# Patient Record
Sex: MALE | Race: WHITE | NOT HISPANIC OR LATINO | Employment: FULL TIME | URBAN - METROPOLITAN AREA
[De-identification: names, ages, dates, MRNs, and addresses within clinical notes are randomized per-mention and may not be internally consistent; named-entity substitution may affect disease eponyms.]

---

## 2017-02-17 ENCOUNTER — HOSPITAL ENCOUNTER (OUTPATIENT)
Dept: INFUSION CENTER | Facility: HOSPITAL | Age: 37
Discharge: HOME | End: 2017-02-17
Payer: COMMERCIAL

## 2017-02-17 VITALS
TEMPERATURE: 95.1 F | BODY MASS INDEX: 28.93 KG/M2 | SYSTOLIC BLOOD PRESSURE: 124 MMHG | WEIGHT: 190.26 LBS | DIASTOLIC BLOOD PRESSURE: 72 MMHG | HEART RATE: 68 BPM | RESPIRATION RATE: 20 BRPM

## 2017-02-17 PROCEDURE — 96413 CHEMO IV INFUSION 1 HR: CPT

## 2017-02-17 PROCEDURE — 96415 CHEMO IV INFUSION ADDL HR: CPT

## 2017-02-17 RX ORDER — SODIUM CHLORIDE 9 MG/ML
20 INJECTION, SOLUTION INTRAVENOUS ONCE
Status: COMPLETED | OUTPATIENT
Start: 2017-02-17 | End: 2017-02-17

## 2017-02-17 RX ORDER — DIPHENHYDRAMINE HCL 25 MG
25 TABLET ORAL ONCE
Status: COMPLETED | OUTPATIENT
Start: 2017-02-17 | End: 2017-02-17

## 2017-02-17 RX ORDER — PREDNISONE 20 MG/1
20 TABLET ORAL ONCE
Status: COMPLETED | OUTPATIENT
Start: 2017-02-17 | End: 2017-02-17

## 2017-02-17 RX ORDER — ACETAMINOPHEN 325 MG/1
650 TABLET ORAL ONCE
Status: COMPLETED | OUTPATIENT
Start: 2017-02-17 | End: 2017-02-17

## 2017-02-17 RX ADMIN — PREDNISONE 20 MG: 20 TABLET ORAL at 07:51

## 2017-02-17 RX ADMIN — SODIUM CHLORIDE 20 ML/HR: 0.9 INJECTION, SOLUTION INTRAVENOUS at 07:52

## 2017-02-17 RX ADMIN — ACETAMINOPHEN 650 MG: 325 TABLET, FILM COATED ORAL at 07:52

## 2017-02-17 RX ADMIN — DIPHENHYDRAMINE HCL 25 MG: 25 TABLET ORAL at 07:51

## 2017-02-17 RX ADMIN — INFLIXIMAB 863 MG: 100 INJECTION, POWDER, LYOPHILIZED, FOR SOLUTION INTRAVENOUS at 08:27

## 2017-04-14 ENCOUNTER — HOSPITAL ENCOUNTER (OUTPATIENT)
Dept: INFUSION CENTER | Facility: HOSPITAL | Age: 37
End: 2017-04-14
Payer: COMMERCIAL

## 2017-04-14 RX ORDER — DIPHENHYDRAMINE HCL 25 MG
25 TABLET ORAL ONCE
Status: COMPLETED | OUTPATIENT
Start: 2017-04-17 | End: 2017-04-17

## 2017-04-14 RX ORDER — ACETAMINOPHEN 325 MG/1
650 TABLET ORAL ONCE
Status: COMPLETED | OUTPATIENT
Start: 2017-04-17 | End: 2017-04-17

## 2017-04-14 RX ORDER — SODIUM CHLORIDE 9 MG/ML
20 INJECTION, SOLUTION INTRAVENOUS ONCE
Status: COMPLETED | OUTPATIENT
Start: 2017-04-17 | End: 2017-04-17

## 2017-04-17 ENCOUNTER — HOSPITAL ENCOUNTER (OUTPATIENT)
Dept: INFUSION CENTER | Facility: HOSPITAL | Age: 37
Discharge: HOME | End: 2017-04-17
Payer: COMMERCIAL

## 2017-04-17 VITALS
DIASTOLIC BLOOD PRESSURE: 59 MMHG | TEMPERATURE: 96.8 F | SYSTOLIC BLOOD PRESSURE: 111 MMHG | BODY MASS INDEX: 30.54 KG/M2 | HEART RATE: 74 BPM | WEIGHT: 200.84 LBS | RESPIRATION RATE: 20 BRPM

## 2017-04-17 PROCEDURE — 96413 CHEMO IV INFUSION 1 HR: CPT

## 2017-04-17 PROCEDURE — 96415 CHEMO IV INFUSION ADDL HR: CPT

## 2017-04-17 RX ADMIN — SODIUM CHLORIDE 20 ML/HR: 0.9 INJECTION, SOLUTION INTRAVENOUS at 09:40

## 2017-04-17 RX ADMIN — ACETAMINOPHEN 650 MG: 325 TABLET, FILM COATED ORAL at 09:39

## 2017-04-17 RX ADMIN — INFLIXIMAB 900 MG: 100 INJECTION, POWDER, LYOPHILIZED, FOR SOLUTION INTRAVENOUS at 10:44

## 2017-04-17 RX ADMIN — DIPHENHYDRAMINE HCL 25 MG: 25 TABLET ORAL at 09:39

## 2017-04-17 NOTE — PLAN OF CARE
Problem: Potential for Falls  Goal: Patient will remain free of falls  INTERVENTIONS:  - Assess patient frequently for physical needs  - Identify cognitive and physical deficits and behaviors that affect risk of falls    - [place] fall precautions as indicated by assessment   - Educate patient/family on patient safety including physical limitations  - Instruct patient to call for assistance with activity based on assessment  - Modify environment to reduce risk of injury  - Consider OT/PT consult to assist with strengthening/mobility   Outcome: Progressing

## 2017-04-17 NOTE — PROGRESS NOTES
Patient has been ordered prednisone 20mg po in the past and is requesting dose not on today's orders  Per Dr Gila Huffman, will discontinue prednisone for now on

## 2017-06-09 RX ORDER — DIPHENHYDRAMINE HCL 25 MG
25 TABLET ORAL ONCE
Status: COMPLETED | OUTPATIENT
Start: 2017-06-12 | End: 2017-06-12

## 2017-06-09 RX ORDER — SODIUM CHLORIDE 9 MG/ML
20 INJECTION, SOLUTION INTRAVENOUS ONCE
Status: COMPLETED | OUTPATIENT
Start: 2017-06-12 | End: 2017-06-12

## 2017-06-09 RX ORDER — ACETAMINOPHEN 325 MG/1
650 TABLET ORAL ONCE
Status: COMPLETED | OUTPATIENT
Start: 2017-06-12 | End: 2017-06-12

## 2017-06-09 RX ORDER — PREDNISONE 20 MG/1
20 TABLET ORAL ONCE
Status: COMPLETED | OUTPATIENT
Start: 2017-06-12 | End: 2017-06-12

## 2017-06-12 ENCOUNTER — HOSPITAL ENCOUNTER (OUTPATIENT)
Dept: INFUSION CENTER | Facility: HOSPITAL | Age: 37
Discharge: HOME | End: 2017-06-12
Payer: COMMERCIAL

## 2017-06-12 VITALS
RESPIRATION RATE: 18 BRPM | WEIGHT: 199.74 LBS | TEMPERATURE: 97.5 F | HEART RATE: 76 BPM | DIASTOLIC BLOOD PRESSURE: 82 MMHG | SYSTOLIC BLOOD PRESSURE: 130 MMHG | BODY MASS INDEX: 30.37 KG/M2

## 2017-06-12 PROCEDURE — 96413 CHEMO IV INFUSION 1 HR: CPT

## 2017-06-12 PROCEDURE — 96415 CHEMO IV INFUSION ADDL HR: CPT

## 2017-06-12 RX ADMIN — SODIUM CHLORIDE 20 ML/HR: 0.9 INJECTION, SOLUTION INTRAVENOUS at 08:13

## 2017-06-12 RX ADMIN — INFLIXIMAB 900 MG: 100 INJECTION, POWDER, LYOPHILIZED, FOR SOLUTION INTRAVENOUS at 08:53

## 2017-06-12 RX ADMIN — DIPHENHYDRAMINE HCL 25 MG: 25 TABLET ORAL at 08:12

## 2017-06-12 RX ADMIN — PREDNISONE 20 MG: 20 TABLET ORAL at 08:12

## 2017-06-12 RX ADMIN — ACETAMINOPHEN 650 MG: 325 TABLET, FILM COATED ORAL at 08:12

## 2017-08-07 ENCOUNTER — HOSPITAL ENCOUNTER (OUTPATIENT)
Dept: INFUSION CENTER | Facility: HOSPITAL | Age: 37
Discharge: HOME | End: 2017-08-07
Payer: COMMERCIAL

## 2017-08-07 VITALS
WEIGHT: 201.06 LBS | TEMPERATURE: 96 F | SYSTOLIC BLOOD PRESSURE: 128 MMHG | HEART RATE: 74 BPM | BODY MASS INDEX: 30.57 KG/M2 | DIASTOLIC BLOOD PRESSURE: 62 MMHG | RESPIRATION RATE: 20 BRPM

## 2017-08-07 PROCEDURE — 96415 CHEMO IV INFUSION ADDL HR: CPT

## 2017-08-07 PROCEDURE — 96413 CHEMO IV INFUSION 1 HR: CPT

## 2017-08-07 RX ORDER — DIPHENHYDRAMINE HCL 25 MG
25 TABLET ORAL ONCE
Status: COMPLETED | OUTPATIENT
Start: 2017-08-07 | End: 2017-08-07

## 2017-08-07 RX ORDER — PREDNISONE 20 MG/1
20 TABLET ORAL ONCE
Status: COMPLETED | OUTPATIENT
Start: 2017-08-07 | End: 2017-08-07

## 2017-08-07 RX ORDER — SODIUM CHLORIDE 9 MG/ML
20 INJECTION, SOLUTION INTRAVENOUS ONCE
Status: COMPLETED | OUTPATIENT
Start: 2017-08-07 | End: 2017-08-07

## 2017-08-07 RX ORDER — ACETAMINOPHEN 325 MG/1
650 TABLET ORAL ONCE
Status: COMPLETED | OUTPATIENT
Start: 2017-08-07 | End: 2017-08-07

## 2017-08-07 RX ADMIN — DIPHENHYDRAMINE HCL 25 MG: 25 TABLET ORAL at 08:20

## 2017-08-07 RX ADMIN — SODIUM CHLORIDE 20 ML/HR: 0.9 INJECTION, SOLUTION INTRAVENOUS at 08:21

## 2017-08-07 RX ADMIN — ACETAMINOPHEN 650 MG: 325 TABLET, FILM COATED ORAL at 08:20

## 2017-08-07 RX ADMIN — INFLIXIMAB 900 MG: 100 INJECTION, POWDER, LYOPHILIZED, FOR SOLUTION INTRAVENOUS at 09:20

## 2017-08-07 RX ADMIN — PREDNISONE 20 MG: 20 TABLET ORAL at 08:20

## 2017-08-07 NOTE — PLAN OF CARE
Problem: Potential for Falls  Goal: Patient will remain free of falls  INTERVENTIONS:  - Assess patient frequently for physical needs  -  Identify cognitive and physical deficits and behaviors that affect risk of falls    -  [place] fall precautions as indicated by assessment   - Educate patient/family on patient safety including physical limitations  - Instruct patient to call for assistance with activity based on assessment  - Modify environment to reduce risk of injury  - Consider OT/PT consult to assist with strengthening/mobility   Outcome: Progressing

## 2017-09-29 RX ORDER — SODIUM CHLORIDE 9 MG/ML
20 INJECTION, SOLUTION INTRAVENOUS ONCE
Status: COMPLETED | OUTPATIENT
Start: 2017-10-02 | End: 2017-10-02

## 2017-09-29 RX ORDER — ACETAMINOPHEN 325 MG/1
650 TABLET ORAL ONCE
Status: COMPLETED | OUTPATIENT
Start: 2017-10-02 | End: 2017-10-02

## 2017-09-29 RX ORDER — PREDNISONE 20 MG/1
20 TABLET ORAL ONCE
Status: COMPLETED | OUTPATIENT
Start: 2017-10-02 | End: 2017-10-02

## 2017-09-29 RX ORDER — DIPHENHYDRAMINE HCL 25 MG
25 TABLET ORAL ONCE
Status: COMPLETED | OUTPATIENT
Start: 2017-10-02 | End: 2017-10-02

## 2017-09-29 RX ORDER — PREDNISONE 20 MG/1
20 TABLET ORAL ONCE
Status: DISCONTINUED | OUTPATIENT
Start: 2017-10-01 | End: 2017-09-29

## 2017-10-02 ENCOUNTER — HOSPITAL ENCOUNTER (OUTPATIENT)
Dept: INFUSION CENTER | Facility: HOSPITAL | Age: 37
Discharge: HOME | End: 2017-10-02
Payer: COMMERCIAL

## 2017-10-02 VITALS
HEART RATE: 68 BPM | TEMPERATURE: 97.6 F | WEIGHT: 207.01 LBS | DIASTOLIC BLOOD PRESSURE: 68 MMHG | BODY MASS INDEX: 31.48 KG/M2 | RESPIRATION RATE: 18 BRPM | SYSTOLIC BLOOD PRESSURE: 110 MMHG

## 2017-10-02 PROCEDURE — 96413 CHEMO IV INFUSION 1 HR: CPT

## 2017-10-02 PROCEDURE — 96415 CHEMO IV INFUSION ADDL HR: CPT

## 2017-10-02 RX ADMIN — SODIUM CHLORIDE 20 ML/HR: 0.9 INJECTION, SOLUTION INTRAVENOUS at 08:10

## 2017-10-02 RX ADMIN — DIPHENHYDRAMINE HCL 25 MG: 25 TABLET ORAL at 08:21

## 2017-10-02 RX ADMIN — PREDNISONE 20 MG: 20 TABLET ORAL at 08:21

## 2017-10-02 RX ADMIN — INFLIXIMAB 900 MG: 100 INJECTION, POWDER, LYOPHILIZED, FOR SOLUTION INTRAVENOUS at 09:10

## 2017-10-02 RX ADMIN — ACETAMINOPHEN 650 MG: 325 TABLET, FILM COATED ORAL at 08:21

## 2017-10-02 NOTE — PLAN OF CARE
Problem: Potential for Falls  Goal: Patient will remain free of falls  INTERVENTIONS:  - Assess patient frequently for physical needs  -  Identify cognitive and physical deficits and behaviors that affect risk of falls    -  [place] fall precautions as indicated by assessment   - Educate patient/family on patient safety including physical limitations  - Instruct patient to call for assistance with activity based on assessment  - Modify environment to reduce risk of injury  - Consider OT/PT consult to assist with strengthening/mobility   Outcome: Progressing

## 2017-11-24 RX ORDER — ACETAMINOPHEN 325 MG/1
650 TABLET ORAL ONCE
Status: COMPLETED | OUTPATIENT
Start: 2017-11-27 | End: 2017-11-27

## 2017-11-24 RX ORDER — DIPHENHYDRAMINE HCL 25 MG
25 TABLET ORAL ONCE
Status: COMPLETED | OUTPATIENT
Start: 2017-11-27 | End: 2017-11-27

## 2017-11-24 RX ORDER — PREDNISONE 20 MG/1
20 TABLET ORAL ONCE
Status: COMPLETED | OUTPATIENT
Start: 2017-11-27 | End: 2017-11-27

## 2017-11-24 RX ORDER — SODIUM CHLORIDE 9 MG/ML
20 INJECTION, SOLUTION INTRAVENOUS ONCE
Status: COMPLETED | OUTPATIENT
Start: 2017-11-27 | End: 2017-11-27

## 2017-11-27 ENCOUNTER — HOSPITAL ENCOUNTER (OUTPATIENT)
Dept: INFUSION CENTER | Facility: HOSPITAL | Age: 37
Discharge: HOME | End: 2017-11-27
Payer: COMMERCIAL

## 2017-11-27 VITALS
HEART RATE: 72 BPM | DIASTOLIC BLOOD PRESSURE: 80 MMHG | WEIGHT: 206.57 LBS | BODY MASS INDEX: 31.41 KG/M2 | RESPIRATION RATE: 18 BRPM | SYSTOLIC BLOOD PRESSURE: 110 MMHG | TEMPERATURE: 96.8 F

## 2017-11-27 PROCEDURE — 96415 CHEMO IV INFUSION ADDL HR: CPT

## 2017-11-27 PROCEDURE — 96413 CHEMO IV INFUSION 1 HR: CPT

## 2017-11-27 RX ADMIN — ACETAMINOPHEN 650 MG: 325 TABLET, FILM COATED ORAL at 08:20

## 2017-11-27 RX ADMIN — PREDNISONE 20 MG: 20 TABLET ORAL at 08:20

## 2017-11-27 RX ADMIN — SODIUM CHLORIDE 20 ML/HR: 0.9 INJECTION, SOLUTION INTRAVENOUS at 09:22

## 2017-11-27 RX ADMIN — INFLIXIMAB 900 MG: 100 INJECTION, POWDER, LYOPHILIZED, FOR SOLUTION INTRAVENOUS at 09:22

## 2017-11-27 RX ADMIN — DIPHENHYDRAMINE HCL 25 MG: 25 TABLET ORAL at 08:20

## 2018-01-19 RX ORDER — ACETAMINOPHEN 325 MG/1
650 TABLET ORAL ONCE
Status: COMPLETED | OUTPATIENT
Start: 2018-01-22 | End: 2018-01-22

## 2018-01-19 RX ORDER — SODIUM CHLORIDE 9 MG/ML
20 INJECTION, SOLUTION INTRAVENOUS ONCE
Status: COMPLETED | OUTPATIENT
Start: 2018-01-22 | End: 2018-01-22

## 2018-01-19 RX ORDER — PREDNISONE 20 MG/1
20 TABLET ORAL ONCE
Status: COMPLETED | OUTPATIENT
Start: 2018-01-22 | End: 2018-01-22

## 2018-01-19 RX ORDER — DIPHENHYDRAMINE HCL 25 MG
25 TABLET ORAL ONCE
Status: COMPLETED | OUTPATIENT
Start: 2018-01-22 | End: 2018-01-22

## 2018-01-22 ENCOUNTER — HOSPITAL ENCOUNTER (OUTPATIENT)
Dept: INFUSION CENTER | Facility: HOSPITAL | Age: 38
Discharge: HOME | End: 2018-01-22
Payer: COMMERCIAL

## 2018-01-22 VITALS
BODY MASS INDEX: 32.18 KG/M2 | RESPIRATION RATE: 18 BRPM | TEMPERATURE: 96.7 F | HEART RATE: 89 BPM | SYSTOLIC BLOOD PRESSURE: 109 MMHG | DIASTOLIC BLOOD PRESSURE: 73 MMHG | WEIGHT: 211.64 LBS

## 2018-01-22 PROCEDURE — 96415 CHEMO IV INFUSION ADDL HR: CPT

## 2018-01-22 PROCEDURE — 96413 CHEMO IV INFUSION 1 HR: CPT

## 2018-01-22 RX ADMIN — INFLIXIMAB 960 MG: 100 INJECTION, POWDER, LYOPHILIZED, FOR SOLUTION INTRAVENOUS at 09:16

## 2018-01-22 RX ADMIN — ACETAMINOPHEN 650 MG: 325 TABLET, FILM COATED ORAL at 08:27

## 2018-01-22 RX ADMIN — DIPHENHYDRAMINE HCL 25 MG: 25 TABLET ORAL at 08:27

## 2018-01-22 RX ADMIN — PREDNISONE 20 MG: 20 TABLET ORAL at 08:27

## 2018-01-22 RX ADMIN — SODIUM CHLORIDE 20 ML/HR: 0.9 INJECTION, SOLUTION INTRAVENOUS at 08:24

## 2018-01-22 NOTE — PROGRESS NOTES
Pt tolerated remicade infusion to full titrations  Pt discharged in stable condition and next appointment scheduled in 8 weeks   Declined AVS

## 2018-01-22 NOTE — PLAN OF CARE
Problem: Potential for Falls  Goal: Patient will remain free of falls  INTERVENTIONS:  - Assess patient frequently for physical needs  -  Identify cognitive and physical deficits and behaviors that affect risk of falls    -  [place] fall precautions as indicated by assessment   - Educate patient/family on patient safety including physical limitations  - Instruct patient to call for assistance with activity based on assessment  - Modify environment to reduce risk of injury  - Consider OT/PT consult to assist with strengthening/mobility   Outcome: Progressing

## 2018-03-16 RX ORDER — DIPHENHYDRAMINE HCL 25 MG
25 TABLET ORAL ONCE
Status: COMPLETED | OUTPATIENT
Start: 2018-03-19 | End: 2018-03-19

## 2018-03-16 RX ORDER — PREDNISONE 20 MG/1
20 TABLET ORAL ONCE
Status: COMPLETED | OUTPATIENT
Start: 2018-03-19 | End: 2018-03-19

## 2018-03-16 RX ORDER — SODIUM CHLORIDE 9 MG/ML
20 INJECTION, SOLUTION INTRAVENOUS ONCE
Status: COMPLETED | OUTPATIENT
Start: 2018-03-19 | End: 2018-03-19

## 2018-03-16 RX ORDER — ACETAMINOPHEN 325 MG/1
650 TABLET ORAL ONCE
Status: COMPLETED | OUTPATIENT
Start: 2018-03-19 | End: 2018-03-19

## 2018-03-19 ENCOUNTER — HOSPITAL ENCOUNTER (OUTPATIENT)
Dept: INFUSION CENTER | Facility: HOSPITAL | Age: 38
Discharge: HOME | End: 2018-03-19
Payer: COMMERCIAL

## 2018-03-19 VITALS — BODY MASS INDEX: 32.25 KG/M2 | WEIGHT: 212.08 LBS

## 2018-03-19 PROCEDURE — 96415 CHEMO IV INFUSION ADDL HR: CPT

## 2018-03-19 PROCEDURE — 96413 CHEMO IV INFUSION 1 HR: CPT

## 2018-03-19 RX ADMIN — DIPHENHYDRAMINE HCL 25 MG: 25 TABLET ORAL at 08:00

## 2018-03-19 RX ADMIN — ACETAMINOPHEN 650 MG: 325 TABLET, FILM COATED ORAL at 07:59

## 2018-03-19 RX ADMIN — PREDNISONE 20 MG: 20 TABLET ORAL at 08:00

## 2018-03-19 RX ADMIN — INFLIXIMAB 962 MG: 100 INJECTION, POWDER, LYOPHILIZED, FOR SOLUTION INTRAVENOUS at 08:39

## 2018-03-19 RX ADMIN — SODIUM CHLORIDE 20 ML/HR: 0.9 INJECTION, SOLUTION INTRAVENOUS at 08:38

## 2018-05-14 ENCOUNTER — HOSPITAL ENCOUNTER (OUTPATIENT)
Dept: INFUSION CENTER | Facility: HOSPITAL | Age: 38
Discharge: HOME | End: 2018-05-14
Payer: COMMERCIAL

## 2018-05-14 VITALS
HEART RATE: 84 BPM | BODY MASS INDEX: 32.11 KG/M2 | SYSTOLIC BLOOD PRESSURE: 121 MMHG | TEMPERATURE: 97.5 F | RESPIRATION RATE: 18 BRPM | WEIGHT: 211.2 LBS | DIASTOLIC BLOOD PRESSURE: 68 MMHG

## 2018-05-14 PROCEDURE — 96413 CHEMO IV INFUSION 1 HR: CPT

## 2018-05-14 PROCEDURE — 96415 CHEMO IV INFUSION ADDL HR: CPT

## 2018-05-14 RX ORDER — ACETAMINOPHEN 325 MG/1
650 TABLET ORAL ONCE
Status: COMPLETED | OUTPATIENT
Start: 2018-05-14 | End: 2018-05-14

## 2018-05-14 RX ORDER — DIPHENHYDRAMINE HCL 25 MG
25 TABLET ORAL ONCE
Status: COMPLETED | OUTPATIENT
Start: 2018-05-14 | End: 2018-05-14

## 2018-05-14 RX ORDER — PREDNISONE 20 MG/1
20 TABLET ORAL ONCE
Status: COMPLETED | OUTPATIENT
Start: 2018-05-14 | End: 2018-05-14

## 2018-05-14 RX ORDER — SODIUM CHLORIDE 9 MG/ML
20 INJECTION, SOLUTION INTRAVENOUS ONCE
Status: COMPLETED | OUTPATIENT
Start: 2018-05-14 | End: 2018-05-14

## 2018-05-14 RX ADMIN — SODIUM CHLORIDE 20 ML/HR: 0.9 INJECTION, SOLUTION INTRAVENOUS at 07:59

## 2018-05-14 RX ADMIN — INFLIXIMAB 900 MG: 100 INJECTION, POWDER, LYOPHILIZED, FOR SOLUTION INTRAVENOUS at 08:57

## 2018-05-14 RX ADMIN — DIPHENHYDRAMINE HCL 25 MG: 25 TABLET ORAL at 07:59

## 2018-05-14 RX ADMIN — ACETAMINOPHEN 650 MG: 325 TABLET, FILM COATED ORAL at 07:59

## 2018-05-14 RX ADMIN — PREDNISONE 20 MG: 20 TABLET ORAL at 07:59

## 2018-07-09 ENCOUNTER — HOSPITAL ENCOUNTER (OUTPATIENT)
Dept: INFUSION CENTER | Facility: HOSPITAL | Age: 38
End: 2018-07-09

## 2018-11-19 RX ORDER — ACETAMINOPHEN 325 MG/1
650 TABLET ORAL ONCE
Status: COMPLETED | OUTPATIENT
Start: 2018-11-20 | End: 2018-11-20

## 2018-11-19 RX ORDER — DIPHENHYDRAMINE HCL 25 MG
25 TABLET ORAL ONCE
Status: COMPLETED | OUTPATIENT
Start: 2018-11-20 | End: 2018-11-20

## 2018-11-20 ENCOUNTER — HOSPITAL ENCOUNTER (OUTPATIENT)
Dept: INFUSION CENTER | Facility: HOSPITAL | Age: 38
Discharge: HOME | End: 2018-11-20
Payer: COMMERCIAL

## 2018-11-20 VITALS
WEIGHT: 215.17 LBS | SYSTOLIC BLOOD PRESSURE: 122 MMHG | HEART RATE: 76 BPM | BODY MASS INDEX: 32.72 KG/M2 | RESPIRATION RATE: 20 BRPM | TEMPERATURE: 97.4 F | DIASTOLIC BLOOD PRESSURE: 76 MMHG

## 2018-11-20 PROCEDURE — 96415 CHEMO IV INFUSION ADDL HR: CPT

## 2018-11-20 PROCEDURE — 96413 CHEMO IV INFUSION 1 HR: CPT

## 2018-11-20 RX ADMIN — ACETAMINOPHEN 650 MG: 325 TABLET, FILM COATED ORAL at 12:42

## 2018-11-20 RX ADMIN — DIPHENHYDRAMINE HCL 25 MG: 25 TABLET ORAL at 12:42

## 2018-11-20 RX ADMIN — INFLIXIMAB 900 MG: 100 INJECTION, POWDER, LYOPHILIZED, FOR SOLUTION INTRAVENOUS at 13:22

## 2018-12-27 RX ORDER — ACETAMINOPHEN 325 MG/1
650 TABLET ORAL ONCE
Status: COMPLETED | OUTPATIENT
Start: 2018-12-28 | End: 2018-12-28

## 2018-12-27 RX ORDER — DIPHENHYDRAMINE HCL 25 MG
25 TABLET ORAL ONCE
Status: COMPLETED | OUTPATIENT
Start: 2018-12-28 | End: 2018-12-28

## 2018-12-28 ENCOUNTER — HOSPITAL ENCOUNTER (OUTPATIENT)
Dept: INFUSION CENTER | Facility: HOSPITAL | Age: 38
Discharge: HOME | End: 2018-12-28
Payer: COMMERCIAL

## 2018-12-28 VITALS
HEART RATE: 76 BPM | BODY MASS INDEX: 33.19 KG/M2 | RESPIRATION RATE: 16 BRPM | WEIGHT: 218.26 LBS | TEMPERATURE: 97.8 F | SYSTOLIC BLOOD PRESSURE: 116 MMHG | DIASTOLIC BLOOD PRESSURE: 80 MMHG

## 2018-12-28 PROCEDURE — 96413 CHEMO IV INFUSION 1 HR: CPT

## 2018-12-28 PROCEDURE — 96415 CHEMO IV INFUSION ADDL HR: CPT

## 2018-12-28 RX ADMIN — DIPHENHYDRAMINE HCL 25 MG: 25 TABLET, FILM COATED ORAL at 12:29

## 2018-12-28 RX ADMIN — INFLIXIMAB 900 MG: 100 INJECTION, POWDER, LYOPHILIZED, FOR SOLUTION INTRAVENOUS at 13:27

## 2018-12-28 RX ADMIN — ACETAMINOPHEN 650 MG: 325 TABLET ORAL at 12:29

## 2019-02-08 ENCOUNTER — HOSPITAL ENCOUNTER (OUTPATIENT)
Dept: INFUSION CENTER | Facility: HOSPITAL | Age: 39
Discharge: HOME | End: 2019-02-08

## 2019-02-13 RX ORDER — ACETAMINOPHEN 325 MG/1
650 TABLET ORAL ONCE
Status: COMPLETED | OUTPATIENT
Start: 2019-02-14 | End: 2019-02-14

## 2019-02-13 RX ORDER — DIPHENHYDRAMINE HCL 25 MG
25 TABLET ORAL ONCE
Status: COMPLETED | OUTPATIENT
Start: 2019-02-14 | End: 2019-02-14

## 2019-02-14 ENCOUNTER — HOSPITAL ENCOUNTER (OUTPATIENT)
Dept: INFUSION CENTER | Facility: HOSPITAL | Age: 39
Discharge: HOME | End: 2019-02-14
Payer: COMMERCIAL

## 2019-02-14 VITALS
TEMPERATURE: 97.7 F | HEART RATE: 64 BPM | RESPIRATION RATE: 16 BRPM | BODY MASS INDEX: 32.31 KG/M2 | DIASTOLIC BLOOD PRESSURE: 70 MMHG | WEIGHT: 212.52 LBS | SYSTOLIC BLOOD PRESSURE: 118 MMHG

## 2019-02-14 PROCEDURE — 96415 CHEMO IV INFUSION ADDL HR: CPT

## 2019-02-14 PROCEDURE — 96413 CHEMO IV INFUSION 1 HR: CPT

## 2019-02-14 RX ADMIN — DIPHENHYDRAMINE HCL 25 MG: 25 TABLET ORAL at 12:06

## 2019-02-14 RX ADMIN — ACETAMINOPHEN 650 MG: 325 TABLET, FILM COATED ORAL at 12:10

## 2019-02-14 RX ADMIN — INFLIXIMAB 900 MG: 100 INJECTION, POWDER, LYOPHILIZED, FOR SOLUTION INTRAVENOUS at 13:01

## 2019-02-14 NOTE — PLAN OF CARE
Problem: SAFETY ADULT  Goal: Patient will remain free of falls  Description  INTERVENTIONS:  - Assess patient frequently for physical needs  -  Identify cognitive and physical deficits and behaviors that affect risk of falls    -  [place] fall precautions as indicated by assessment   - Educate patient/family on patient safety including physical limitations  - Instruct patient to call for assistance with activity based on assessment  - Modify environment to reduce risk of injury  - Consider OT/PT consult to assist with strengthening/mobility  Outcome: Progressing

## 2019-03-28 RX ORDER — ACETAMINOPHEN 325 MG/1
650 TABLET ORAL ONCE
Status: COMPLETED | OUTPATIENT
Start: 2019-03-29 | End: 2019-03-29

## 2019-03-28 RX ORDER — DIPHENHYDRAMINE HCL 25 MG
25 TABLET ORAL ONCE
Status: COMPLETED | OUTPATIENT
Start: 2019-03-29 | End: 2019-03-29

## 2019-03-29 ENCOUNTER — HOSPITAL ENCOUNTER (OUTPATIENT)
Dept: INFUSION CENTER | Facility: HOSPITAL | Age: 39
Discharge: HOME | End: 2019-03-29
Payer: COMMERCIAL

## 2019-03-29 VITALS
SYSTOLIC BLOOD PRESSURE: 129 MMHG | DIASTOLIC BLOOD PRESSURE: 72 MMHG | WEIGHT: 216.49 LBS | HEART RATE: 94 BPM | BODY MASS INDEX: 32.92 KG/M2 | RESPIRATION RATE: 18 BRPM | TEMPERATURE: 96.4 F

## 2019-03-29 PROCEDURE — 96413 CHEMO IV INFUSION 1 HR: CPT

## 2019-03-29 PROCEDURE — 96415 CHEMO IV INFUSION ADDL HR: CPT

## 2019-03-29 RX ADMIN — DIPHENHYDRAMINE HCL 25 MG: 25 TABLET, FILM COATED ORAL at 12:49

## 2019-03-29 RX ADMIN — ACETAMINOPHEN 650 MG: 325 TABLET ORAL at 12:49

## 2019-03-29 RX ADMIN — INFLIXIMAB 900 MG: 100 INJECTION, POWDER, LYOPHILIZED, FOR SOLUTION INTRAVENOUS at 13:23

## 2019-05-09 RX ORDER — ACETAMINOPHEN 325 MG/1
650 TABLET ORAL ONCE
Status: COMPLETED | OUTPATIENT
Start: 2019-05-10 | End: 2019-05-10

## 2019-05-09 RX ORDER — DIPHENHYDRAMINE HCL 25 MG
25 TABLET ORAL ONCE
Status: COMPLETED | OUTPATIENT
Start: 2019-05-10 | End: 2019-05-10

## 2019-05-09 RX ORDER — SODIUM CHLORIDE 9 MG/ML
20 INJECTION, SOLUTION INTRAVENOUS ONCE
Status: COMPLETED | OUTPATIENT
Start: 2019-05-10 | End: 2019-05-10

## 2019-05-10 ENCOUNTER — HOSPITAL ENCOUNTER (OUTPATIENT)
Dept: INFUSION CENTER | Facility: HOSPITAL | Age: 39
Discharge: HOME | End: 2019-05-10
Payer: COMMERCIAL

## 2019-05-10 VITALS
DIASTOLIC BLOOD PRESSURE: 66 MMHG | BODY MASS INDEX: 33.15 KG/M2 | RESPIRATION RATE: 18 BRPM | HEART RATE: 77 BPM | TEMPERATURE: 98.3 F | SYSTOLIC BLOOD PRESSURE: 121 MMHG | WEIGHT: 218 LBS

## 2019-05-10 PROCEDURE — 96415 CHEMO IV INFUSION ADDL HR: CPT

## 2019-05-10 PROCEDURE — 96413 CHEMO IV INFUSION 1 HR: CPT

## 2019-05-10 RX ADMIN — INFLIXIMAB 900 MG: 100 INJECTION, POWDER, LYOPHILIZED, FOR SOLUTION INTRAVENOUS at 13:36

## 2019-05-10 RX ADMIN — ACETAMINOPHEN 650 MG: 325 TABLET, FILM COATED ORAL at 12:56

## 2019-05-10 RX ADMIN — SODIUM CHLORIDE 20 ML/HR: 0.9 INJECTION, SOLUTION INTRAVENOUS at 13:02

## 2019-05-10 RX ADMIN — DIPHENHYDRAMINE HCL 25 MG: 25 TABLET ORAL at 12:56

## 2019-05-10 NOTE — PLAN OF CARE
Problem: Potential for Falls  Goal: Patient will remain free of falls  Description  INTERVENTIONS:  - Assess patient frequently for physical needs  -  Identify cognitive and physical deficits and behaviors that affect risk of falls  -  [place] fall precautions as indicated by assessment   - Educate patient/family on patient safety including physical limitations  - Instruct patient to call for assistance with activity based on assessment  - Modify environment to reduce risk of injury  - Consider OT/PT consult to assist with strengthening/mobility  Outcome: Progressing     Problem: Knowledge Deficit  Goal: Patient/family/caregiver demonstrates understanding of disease process, treatment plan, medications, and discharge instructions  Description  Complete learning assessment and assess knowledge base    Interventions:  - Provide teaching at level of understanding  - Provide teaching via preferred learning methods  Outcome: Progressing

## 2019-06-19 RX ORDER — DIPHENHYDRAMINE HCL 25 MG
25 TABLET ORAL ONCE
Status: COMPLETED | OUTPATIENT
Start: 2019-06-21 | End: 2019-06-21

## 2019-06-19 RX ORDER — ACETAMINOPHEN 325 MG/1
650 TABLET ORAL ONCE
Status: COMPLETED | OUTPATIENT
Start: 2019-06-21 | End: 2019-06-21

## 2019-06-19 RX ORDER — SODIUM CHLORIDE 9 MG/ML
20 INJECTION, SOLUTION INTRAVENOUS ONCE
Status: COMPLETED | OUTPATIENT
Start: 2019-06-21 | End: 2019-06-21

## 2019-06-21 ENCOUNTER — HOSPITAL ENCOUNTER (OUTPATIENT)
Dept: INFUSION CENTER | Facility: HOSPITAL | Age: 39
Discharge: HOME | End: 2019-06-21
Payer: COMMERCIAL

## 2019-06-21 VITALS
DIASTOLIC BLOOD PRESSURE: 92 MMHG | HEART RATE: 80 BPM | TEMPERATURE: 97.4 F | RESPIRATION RATE: 15 BRPM | SYSTOLIC BLOOD PRESSURE: 138 MMHG | BODY MASS INDEX: 33.86 KG/M2 | WEIGHT: 222.66 LBS

## 2019-06-21 PROCEDURE — 96415 CHEMO IV INFUSION ADDL HR: CPT

## 2019-06-21 PROCEDURE — 96413 CHEMO IV INFUSION 1 HR: CPT

## 2019-06-21 RX ADMIN — SODIUM CHLORIDE 20 ML/HR: 0.9 INJECTION, SOLUTION INTRAVENOUS at 13:48

## 2019-06-21 RX ADMIN — DIPHENHYDRAMINE HCL 25 MG: 25 TABLET, COATED ORAL at 13:46

## 2019-06-21 RX ADMIN — INFLIXIMAB 900 MG: 100 INJECTION, POWDER, LYOPHILIZED, FOR SOLUTION INTRAVENOUS at 14:18

## 2019-06-21 RX ADMIN — ACETAMINOPHEN 650 MG: 325 TABLET ORAL at 13:46

## 2019-08-01 RX ORDER — SODIUM CHLORIDE 9 MG/ML
20 INJECTION, SOLUTION INTRAVENOUS ONCE
Status: DISCONTINUED | OUTPATIENT
Start: 2019-08-02 | End: 2019-08-01

## 2019-08-01 RX ORDER — ACETAMINOPHEN 325 MG/1
650 TABLET ORAL ONCE
Status: COMPLETED | OUTPATIENT
Start: 2019-08-02 | End: 2019-08-02

## 2019-08-01 RX ORDER — SODIUM CHLORIDE 9 MG/ML
20 INJECTION, SOLUTION INTRAVENOUS ONCE
Status: COMPLETED | OUTPATIENT
Start: 2019-08-02 | End: 2019-08-02

## 2019-08-01 RX ORDER — DIPHENHYDRAMINE HCL 25 MG
25 TABLET ORAL ONCE
Status: COMPLETED | OUTPATIENT
Start: 2019-08-02 | End: 2019-08-02

## 2019-08-02 ENCOUNTER — HOSPITAL ENCOUNTER (OUTPATIENT)
Dept: INFUSION CENTER | Facility: HOSPITAL | Age: 39
Discharge: HOME | End: 2019-08-02
Payer: COMMERCIAL

## 2019-08-02 VITALS
BODY MASS INDEX: 33.19 KG/M2 | SYSTOLIC BLOOD PRESSURE: 125 MMHG | RESPIRATION RATE: 18 BRPM | WEIGHT: 218.26 LBS | DIASTOLIC BLOOD PRESSURE: 73 MMHG | HEART RATE: 72 BPM | TEMPERATURE: 97.9 F

## 2019-08-02 PROCEDURE — 96413 CHEMO IV INFUSION 1 HR: CPT

## 2019-08-02 PROCEDURE — 96415 CHEMO IV INFUSION ADDL HR: CPT

## 2019-08-02 RX ADMIN — ACETAMINOPHEN 650 MG: 325 TABLET ORAL at 13:03

## 2019-08-02 RX ADMIN — SODIUM CHLORIDE 20 ML/HR: 0.9 INJECTION, SOLUTION INTRAVENOUS at 13:02

## 2019-08-02 RX ADMIN — INFLIXIMAB 900 MG: 100 INJECTION, POWDER, LYOPHILIZED, FOR SOLUTION INTRAVENOUS at 14:13

## 2019-08-02 RX ADMIN — DIPHENHYDRAMINE HCL 25 MG: 25 TABLET ORAL at 13:03

## 2019-08-02 NOTE — PLAN OF CARE
Problem: Potential for Falls  Goal: Patient will remain free of falls  Description  INTERVENTIONS:  - Assess patient frequently for physical needs  -  Identify cognitive and physical deficits and behaviors that affect risk of falls    -  [place] fall precautions as indicated by assessment   - Educate patient/family on patient safety including physical limitations  - Instruct patient to call for assistance with activity based on assessment  - Modify environment to reduce risk of injury  - Consider OT/PT consult to assist with strengthening/mobility  Outcome: Progressing

## 2019-09-10 RX ORDER — DIPHENHYDRAMINE HCL 25 MG
25 TABLET ORAL ONCE
Status: COMPLETED | OUTPATIENT
Start: 2019-09-12 | End: 2019-09-12

## 2019-09-10 RX ORDER — SODIUM CHLORIDE 9 MG/ML
20 INJECTION, SOLUTION INTRAVENOUS ONCE
Status: COMPLETED | OUTPATIENT
Start: 2019-09-12 | End: 2019-09-12

## 2019-09-10 RX ORDER — ACETAMINOPHEN 325 MG/1
650 TABLET ORAL ONCE
Status: COMPLETED | OUTPATIENT
Start: 2019-09-12 | End: 2019-09-12

## 2019-09-12 ENCOUNTER — HOSPITAL ENCOUNTER (OUTPATIENT)
Dept: INFUSION CENTER | Facility: HOSPITAL | Age: 39
Discharge: HOME | End: 2019-09-12
Payer: COMMERCIAL

## 2019-09-12 VITALS
RESPIRATION RATE: 16 BRPM | WEIGHT: 216.49 LBS | SYSTOLIC BLOOD PRESSURE: 112 MMHG | BODY MASS INDEX: 32.92 KG/M2 | DIASTOLIC BLOOD PRESSURE: 70 MMHG | HEART RATE: 75 BPM | TEMPERATURE: 97 F

## 2019-09-12 PROCEDURE — 96415 CHEMO IV INFUSION ADDL HR: CPT

## 2019-09-12 PROCEDURE — 96413 CHEMO IV INFUSION 1 HR: CPT

## 2019-09-12 RX ADMIN — INFLIXIMAB 900 MG: 100 INJECTION, POWDER, LYOPHILIZED, FOR SOLUTION INTRAVENOUS at 13:34

## 2019-09-12 RX ADMIN — SODIUM CHLORIDE 20 ML/HR: 0.9 INJECTION, SOLUTION INTRAVENOUS at 13:06

## 2019-09-12 RX ADMIN — DIPHENHYDRAMINE HCL 25 MG: 25 TABLET, COATED ORAL at 13:06

## 2019-09-12 RX ADMIN — ACETAMINOPHEN 650 MG: 325 TABLET ORAL at 13:06

## 2019-10-23 RX ORDER — DIPHENHYDRAMINE HCL 25 MG
25 TABLET ORAL ONCE
Status: COMPLETED | OUTPATIENT
Start: 2019-10-25 | End: 2019-10-25

## 2019-10-23 RX ORDER — ACETAMINOPHEN 325 MG/1
650 TABLET ORAL ONCE
Status: COMPLETED | OUTPATIENT
Start: 2019-10-25 | End: 2019-10-25

## 2019-10-23 RX ORDER — SODIUM CHLORIDE 9 MG/ML
20 INJECTION, SOLUTION INTRAVENOUS ONCE
Status: DISCONTINUED | OUTPATIENT
Start: 2019-10-25 | End: 2019-10-23

## 2019-10-25 ENCOUNTER — HOSPITAL ENCOUNTER (OUTPATIENT)
Dept: INFUSION CENTER | Facility: HOSPITAL | Age: 39
Discharge: HOME | End: 2019-10-25
Payer: COMMERCIAL

## 2019-10-25 VITALS
WEIGHT: 222.88 LBS | SYSTOLIC BLOOD PRESSURE: 110 MMHG | HEART RATE: 79 BPM | BODY MASS INDEX: 33.89 KG/M2 | RESPIRATION RATE: 18 BRPM | TEMPERATURE: 97.5 F | DIASTOLIC BLOOD PRESSURE: 66 MMHG

## 2019-10-25 PROCEDURE — 96413 CHEMO IV INFUSION 1 HR: CPT

## 2019-10-25 PROCEDURE — 96415 CHEMO IV INFUSION ADDL HR: CPT

## 2019-10-25 RX ADMIN — ACETAMINOPHEN 650 MG: 325 TABLET ORAL at 13:13

## 2019-10-25 RX ADMIN — DIPHENHYDRAMINE HCL 25 MG: 25 TABLET, COATED ORAL at 13:13

## 2019-10-25 RX ADMIN — INFLIXIMAB 900 MG: 100 INJECTION, POWDER, LYOPHILIZED, FOR SOLUTION INTRAVENOUS at 13:54

## 2019-10-25 NOTE — PLAN OF CARE
Problem: Potential for Falls  Goal: Patient will remain free of falls  Description  INTERVENTIONS:  - Assess patient frequently for physical needs  -  Identify cognitive and physical deficits and behaviors that affect risk of falls    -  [place] fall precautions as indicated by assessment   - Educate patient/family on patient safety including physical limitations  - Instruct patient to call for assistance with activity based on assessment  - Modify environment to reduce risk of injury  - Consider OT/PT consult to assist with strengthening/mobility  Outcome: Progressing

## 2019-12-04 RX ORDER — SODIUM CHLORIDE 9 MG/ML
20 INJECTION, SOLUTION INTRAVENOUS ONCE
Status: DISCONTINUED | OUTPATIENT
Start: 2019-12-06 | End: 2019-12-04

## 2019-12-04 RX ORDER — DIPHENHYDRAMINE HCL 25 MG
25 TABLET ORAL ONCE
Status: COMPLETED | OUTPATIENT
Start: 2019-12-06 | End: 2019-12-06

## 2019-12-04 RX ORDER — ACETAMINOPHEN 325 MG/1
650 TABLET ORAL ONCE
Status: COMPLETED | OUTPATIENT
Start: 2019-12-06 | End: 2019-12-06

## 2019-12-06 ENCOUNTER — HOSPITAL ENCOUNTER (OUTPATIENT)
Dept: INFUSION CENTER | Facility: HOSPITAL | Age: 39
Discharge: HOME | End: 2019-12-06
Payer: COMMERCIAL

## 2019-12-06 VITALS
RESPIRATION RATE: 18 BRPM | SYSTOLIC BLOOD PRESSURE: 119 MMHG | WEIGHT: 220.9 LBS | TEMPERATURE: 97.9 F | DIASTOLIC BLOOD PRESSURE: 72 MMHG | BODY MASS INDEX: 33.59 KG/M2 | HEART RATE: 79 BPM

## 2019-12-06 PROCEDURE — 96413 CHEMO IV INFUSION 1 HR: CPT

## 2019-12-06 PROCEDURE — 96415 CHEMO IV INFUSION ADDL HR: CPT

## 2019-12-06 RX ADMIN — ACETAMINOPHEN 650 MG: 325 TABLET ORAL at 13:13

## 2019-12-06 RX ADMIN — DIPHENHYDRAMINE HCL 25 MG: 25 TABLET, COATED ORAL at 13:13

## 2019-12-06 RX ADMIN — INFLIXIMAB 1000 MG: 100 INJECTION, POWDER, LYOPHILIZED, FOR SOLUTION INTRAVENOUS at 14:44

## 2019-12-06 NOTE — PLAN OF CARE
Problem: Potential for Falls  Goal: Patient will remain free of falls  Description  INTERVENTIONS:  - Assess patient frequently for physical needs  -  Identify cognitive and physical deficits and behaviors that affect risk of falls    -  [place] fall precautions as indicated by assessment   - Educate patient/family on patient safety including physical limitations  - Instruct patient to call for assistance with activity based on assessment  - Modify environment to reduce risk of injury  - Consider OT/PT consult to assist with strengthening/mobility  Outcome: Progressing

## 2020-01-15 RX ORDER — DIPHENHYDRAMINE HCL 25 MG
25 TABLET ORAL ONCE
Status: COMPLETED | OUTPATIENT
Start: 2020-01-17 | End: 2020-01-17

## 2020-01-15 RX ORDER — ACETAMINOPHEN 325 MG/1
650 TABLET ORAL ONCE
Status: COMPLETED | OUTPATIENT
Start: 2020-01-17 | End: 2020-01-17

## 2020-01-17 ENCOUNTER — HOSPITAL ENCOUNTER (OUTPATIENT)
Dept: INFUSION CENTER | Facility: HOSPITAL | Age: 40
Discharge: HOME | End: 2020-01-17
Payer: COMMERCIAL

## 2020-01-17 VITALS
BODY MASS INDEX: 34.46 KG/M2 | TEMPERATURE: 97.6 F | DIASTOLIC BLOOD PRESSURE: 60 MMHG | RESPIRATION RATE: 18 BRPM | WEIGHT: 226.63 LBS | HEART RATE: 82 BPM | SYSTOLIC BLOOD PRESSURE: 118 MMHG

## 2020-01-17 PROCEDURE — 96415 CHEMO IV INFUSION ADDL HR: CPT

## 2020-01-17 PROCEDURE — 96413 CHEMO IV INFUSION 1 HR: CPT

## 2020-01-17 RX ADMIN — DIPHENHYDRAMINE HCL 25 MG: 25 TABLET, COATED ORAL at 13:26

## 2020-01-17 RX ADMIN — ACETAMINOPHEN 650 MG: 325 TABLET ORAL at 13:26

## 2020-01-17 RX ADMIN — INFLIXIMAB 1000 MG: 100 INJECTION, POWDER, LYOPHILIZED, FOR SOLUTION INTRAVENOUS at 14:01

## 2020-02-27 RX ORDER — DIPHENHYDRAMINE HCL 25 MG
25 TABLET ORAL ONCE
Status: COMPLETED | OUTPATIENT
Start: 2020-02-28 | End: 2020-02-28

## 2020-02-27 RX ORDER — ACETAMINOPHEN 325 MG/1
650 TABLET ORAL ONCE
Status: COMPLETED | OUTPATIENT
Start: 2020-02-28 | End: 2020-02-28

## 2020-02-28 ENCOUNTER — HOSPITAL ENCOUNTER (OUTPATIENT)
Dept: INFUSION CENTER | Facility: HOSPITAL | Age: 40
Discharge: HOME | End: 2020-02-28
Payer: COMMERCIAL

## 2020-02-28 VITALS
DIASTOLIC BLOOD PRESSURE: 82 MMHG | HEART RATE: 91 BPM | TEMPERATURE: 97.3 F | BODY MASS INDEX: 33.72 KG/M2 | RESPIRATION RATE: 18 BRPM | SYSTOLIC BLOOD PRESSURE: 131 MMHG | WEIGHT: 221.78 LBS

## 2020-02-28 PROCEDURE — 96413 CHEMO IV INFUSION 1 HR: CPT

## 2020-02-28 PROCEDURE — 96415 CHEMO IV INFUSION ADDL HR: CPT

## 2020-02-28 RX ADMIN — DIPHENHYDRAMINE HCL 25 MG: 25 TABLET, COATED ORAL at 13:18

## 2020-02-28 RX ADMIN — INFLIXIMAB 1000 MG: 100 INJECTION, POWDER, LYOPHILIZED, FOR SOLUTION INTRAVENOUS at 13:43

## 2020-02-28 RX ADMIN — ACETAMINOPHEN 650 MG: 325 TABLET ORAL at 13:18

## 2020-02-28 NOTE — PLAN OF CARE
Problem: Potential for Falls  Goal: Patient will remain free of falls  Description  INTERVENTIONS:  - Assess patient frequently for physical needs  -  Identify cognitive and physical deficits and behaviors that affect risk of falls    -  [place] fall precautions as indicated by assessment   - Educate patient/family on patient safety including physical limitations  - Instruct patient to call for assistance with activity based on assessment  - Modify environment to reduce risk of injury  - Consider OT/PT consult to assist with strengthening/mobility  Outcome: Progressing

## 2020-04-08 RX ORDER — DIPHENHYDRAMINE HCL 25 MG
25 TABLET ORAL ONCE
Status: COMPLETED | OUTPATIENT
Start: 2020-04-10 | End: 2020-04-10

## 2020-04-08 RX ORDER — ACETAMINOPHEN 325 MG/1
650 TABLET ORAL ONCE
Status: COMPLETED | OUTPATIENT
Start: 2020-04-10 | End: 2020-04-10

## 2020-04-10 ENCOUNTER — HOSPITAL ENCOUNTER (OUTPATIENT)
Dept: INFUSION CENTER | Facility: HOSPITAL | Age: 40
Discharge: HOME | End: 2020-04-10
Payer: COMMERCIAL

## 2020-04-10 VITALS
TEMPERATURE: 98.5 F | HEART RATE: 88 BPM | BODY MASS INDEX: 34.16 KG/M2 | SYSTOLIC BLOOD PRESSURE: 124 MMHG | WEIGHT: 224.65 LBS | RESPIRATION RATE: 18 BRPM | DIASTOLIC BLOOD PRESSURE: 78 MMHG

## 2020-04-10 PROCEDURE — 96413 CHEMO IV INFUSION 1 HR: CPT

## 2020-04-10 PROCEDURE — 96415 CHEMO IV INFUSION ADDL HR: CPT

## 2020-04-10 RX ADMIN — INFLIXIMAB 1000 MG: 100 INJECTION, POWDER, LYOPHILIZED, FOR SOLUTION INTRAVENOUS at 13:48

## 2020-04-10 RX ADMIN — ACETAMINOPHEN 650 MG: 325 TABLET ORAL at 13:09

## 2020-04-10 RX ADMIN — DIPHENHYDRAMINE HCL 25 MG: 25 TABLET, COATED ORAL at 13:09

## 2020-04-27 ENCOUNTER — HOSPITAL ENCOUNTER (OUTPATIENT)
Dept: CT IMAGING | Facility: HOSPITAL | Age: 40
Discharge: HOME | End: 2020-04-27
Payer: COMMERCIAL

## 2020-04-27 ENCOUNTER — TRANSCRIBE ORDERS (OUTPATIENT)
Dept: ADMINISTRATIVE | Facility: HOSPITAL | Age: 40
End: 2020-04-27

## 2020-04-27 DIAGNOSIS — R10.32 LLQ ABDOMINAL PAIN: Primary | ICD-10-CM

## 2020-04-27 DIAGNOSIS — R10.32 LLQ ABDOMINAL PAIN: ICD-10-CM

## 2020-04-27 PROCEDURE — 74177 CT ABD & PELVIS W/CONTRAST: CPT

## 2020-04-27 RX ADMIN — IOHEXOL 50 ML: 240 INJECTION, SOLUTION INTRATHECAL; INTRAVASCULAR; INTRAVENOUS; ORAL at 11:00

## 2020-04-27 RX ADMIN — IOHEXOL 100 ML: 350 INJECTION, SOLUTION INTRAVENOUS at 12:30

## 2020-05-15 ENCOUNTER — TELEPHONE (OUTPATIENT)
Dept: UROLOGY | Facility: MEDICAL CENTER | Age: 40
End: 2020-05-15

## 2020-05-20 RX ORDER — DIPHENHYDRAMINE HCL 25 MG
25 TABLET ORAL ONCE
Status: COMPLETED | OUTPATIENT
Start: 2020-05-22 | End: 2020-05-22

## 2020-05-20 RX ORDER — ACETAMINOPHEN 325 MG/1
650 TABLET ORAL ONCE
Status: COMPLETED | OUTPATIENT
Start: 2020-05-22 | End: 2020-05-22

## 2020-05-21 ENCOUNTER — TELEPHONE (OUTPATIENT)
Dept: UROLOGY | Facility: AMBULATORY SURGERY CENTER | Age: 40
End: 2020-05-21

## 2020-05-21 ENCOUNTER — TELEMEDICINE (OUTPATIENT)
Dept: UROLOGY | Facility: AMBULATORY SURGERY CENTER | Age: 40
End: 2020-05-21
Payer: COMMERCIAL

## 2020-05-21 DIAGNOSIS — N20.0 NEPHROLITHIASIS: ICD-10-CM

## 2020-05-21 DIAGNOSIS — N28.9 RENAL LESION: Primary | ICD-10-CM

## 2020-05-21 PROCEDURE — 99202 OFFICE O/P NEW SF 15 MIN: CPT | Performed by: NURSE PRACTITIONER

## 2020-05-22 ENCOUNTER — HOSPITAL ENCOUNTER (OUTPATIENT)
Dept: INFUSION CENTER | Facility: HOSPITAL | Age: 40
Discharge: HOME | End: 2020-05-22
Payer: COMMERCIAL

## 2020-05-22 VITALS
SYSTOLIC BLOOD PRESSURE: 114 MMHG | HEART RATE: 78 BPM | BODY MASS INDEX: 34.09 KG/M2 | RESPIRATION RATE: 18 BRPM | DIASTOLIC BLOOD PRESSURE: 71 MMHG | TEMPERATURE: 97.1 F | OXYGEN SATURATION: 98 % | WEIGHT: 224.21 LBS

## 2020-05-22 PROCEDURE — 96413 CHEMO IV INFUSION 1 HR: CPT

## 2020-05-22 PROCEDURE — 96415 CHEMO IV INFUSION ADDL HR: CPT

## 2020-05-22 RX ADMIN — INFLIXIMAB 1000 MG: 100 INJECTION, POWDER, LYOPHILIZED, FOR SOLUTION INTRAVENOUS at 13:56

## 2020-05-22 RX ADMIN — DIPHENHYDRAMINE HCL 25 MG: 25 TABLET ORAL at 13:09

## 2020-05-22 RX ADMIN — ACETAMINOPHEN 650 MG: 325 TABLET ORAL at 13:09

## 2020-06-12 DIAGNOSIS — Z01.812 PRE-PROCEDURE LAB EXAM: ICD-10-CM

## 2020-06-12 PROCEDURE — U0003 INFECTIOUS AGENT DETECTION BY NUCLEIC ACID (DNA OR RNA); SEVERE ACUTE RESPIRATORY SYNDROME CORONAVIRUS 2 (SARS-COV-2) (CORONAVIRUS DISEASE [COVID-19]), AMPLIFIED PROBE TECHNIQUE, MAKING USE OF HIGH THROUGHPUT TECHNOLOGIES AS DESCRIBED BY CMS-2020-01-R: HCPCS | Performed by: OBSTETRICS & GYNECOLOGY

## 2020-06-13 LAB — SARS-COV-2 RNA SPEC QL NAA+PROBE: NOT DETECTED

## 2020-06-30 RX ORDER — DIPHENHYDRAMINE HCL 25 MG
25 TABLET ORAL ONCE
Status: DISCONTINUED | OUTPATIENT
Start: 2020-07-04 | End: 2020-07-07 | Stop reason: HOSPADM

## 2020-06-30 RX ORDER — ACETAMINOPHEN 325 MG/1
650 TABLET ORAL ONCE
Status: DISCONTINUED | OUTPATIENT
Start: 2020-07-04 | End: 2020-07-07 | Stop reason: HOSPADM

## 2020-07-04 ENCOUNTER — HOSPITAL ENCOUNTER (OUTPATIENT)
Dept: INFUSION CENTER | Facility: HOSPITAL | Age: 40
Discharge: HOME | End: 2020-07-04

## 2020-07-07 RX ORDER — ACETAMINOPHEN 325 MG/1
650 TABLET ORAL ONCE
Status: COMPLETED | OUTPATIENT
Start: 2020-07-09 | End: 2020-07-09

## 2020-07-07 RX ORDER — DIPHENHYDRAMINE HCL 25 MG
25 TABLET ORAL ONCE
Status: COMPLETED | OUTPATIENT
Start: 2020-07-09 | End: 2020-07-09

## 2020-07-09 ENCOUNTER — HOSPITAL ENCOUNTER (OUTPATIENT)
Dept: INFUSION CENTER | Facility: HOSPITAL | Age: 40
Discharge: HOME | End: 2020-07-09
Payer: COMMERCIAL

## 2020-07-09 VITALS
DIASTOLIC BLOOD PRESSURE: 62 MMHG | WEIGHT: 224.87 LBS | SYSTOLIC BLOOD PRESSURE: 135 MMHG | RESPIRATION RATE: 18 BRPM | TEMPERATURE: 97.5 F | HEART RATE: 78 BPM | BODY MASS INDEX: 34.19 KG/M2

## 2020-07-09 PROCEDURE — 96415 CHEMO IV INFUSION ADDL HR: CPT

## 2020-07-09 PROCEDURE — 96413 CHEMO IV INFUSION 1 HR: CPT

## 2020-07-09 RX ADMIN — INFLIXIMAB 1000 MG: 100 INJECTION, POWDER, LYOPHILIZED, FOR SOLUTION INTRAVENOUS at 13:30

## 2020-07-09 RX ADMIN — ACETAMINOPHEN 650 MG: 325 TABLET ORAL at 12:53

## 2020-07-09 RX ADMIN — DIPHENHYDRAMINE HCL 25 MG: 25 TABLET ORAL at 12:52

## 2020-07-09 NOTE — PLAN OF CARE
Problem: Potential for Falls  Goal: Patient will remain free of falls  Description  INTERVENTIONS:  - Assess patient frequently for physical needs  -  Identify cognitive and physical deficits and behaviors that affect risk of falls    -  [place] fall precautions as indicated by assessment   - Educate patient/family on patient safety including physical limitations  - Instruct patient to call for assistance with activity based on assessment  - Modify environment to reduce risk of injury  - Consider OT/PT consult to assist with strengthening/mobility  Outcome: Progressing

## 2020-08-18 RX ORDER — ACETAMINOPHEN 325 MG/1
650 TABLET ORAL ONCE
Status: COMPLETED | OUTPATIENT
Start: 2020-08-20 | End: 2020-08-20

## 2020-08-18 RX ORDER — SODIUM CHLORIDE 9 MG/ML
20 INJECTION, SOLUTION INTRAVENOUS ONCE
Status: COMPLETED | OUTPATIENT
Start: 2020-08-20 | End: 2020-08-20

## 2020-08-18 RX ORDER — DIPHENHYDRAMINE HCL 25 MG
25 TABLET ORAL ONCE
Status: COMPLETED | OUTPATIENT
Start: 2020-08-20 | End: 2020-08-20

## 2020-08-20 ENCOUNTER — HOSPITAL ENCOUNTER (OUTPATIENT)
Dept: INFUSION CENTER | Facility: HOSPITAL | Age: 40
Discharge: HOME | End: 2020-08-20
Payer: COMMERCIAL

## 2020-08-20 VITALS
TEMPERATURE: 97.6 F | SYSTOLIC BLOOD PRESSURE: 117 MMHG | HEART RATE: 80 BPM | BODY MASS INDEX: 33.86 KG/M2 | DIASTOLIC BLOOD PRESSURE: 66 MMHG | WEIGHT: 222.66 LBS | RESPIRATION RATE: 18 BRPM

## 2020-08-20 PROCEDURE — 96413 CHEMO IV INFUSION 1 HR: CPT

## 2020-08-20 PROCEDURE — 96415 CHEMO IV INFUSION ADDL HR: CPT

## 2020-08-20 RX ADMIN — SODIUM CHLORIDE 20 ML/HR: 0.9 INJECTION, SOLUTION INTRAVENOUS at 13:21

## 2020-08-20 RX ADMIN — ACETAMINOPHEN 650 MG: 325 TABLET, FILM COATED ORAL at 13:21

## 2020-08-20 RX ADMIN — DIPHENHYDRAMINE HCL 25 MG: 25 TABLET ORAL at 13:21

## 2020-08-20 RX ADMIN — INFLIXIMAB 1000 MG: 100 INJECTION, POWDER, LYOPHILIZED, FOR SOLUTION INTRAVENOUS at 14:09

## 2020-08-20 NOTE — PLAN OF CARE
Problem: Potential for Falls  Goal: Patient will remain free of falls  Description: INTERVENTIONS:  - Assess patient frequently for physical needs  -  Identify cognitive and physical deficits and behaviors that affect risk of falls    -  [place] fall precautions as indicated by assessment   - Educate patient/family on patient safety including physical limitations  - Instruct patient to call for assistance with activity based on assessment  - Modify environment to reduce risk of injury  - Consider OT/PT consult to assist with strengthening/mobility  Outcome: Progressing

## 2020-09-29 RX ORDER — ACETAMINOPHEN 325 MG/1
650 TABLET ORAL ONCE
Status: COMPLETED | OUTPATIENT
Start: 2020-10-01 | End: 2020-10-01

## 2020-09-29 RX ORDER — SODIUM CHLORIDE 9 MG/ML
20 INJECTION, SOLUTION INTRAVENOUS ONCE
Status: COMPLETED | OUTPATIENT
Start: 2020-10-01 | End: 2020-10-01

## 2020-09-29 RX ORDER — DIPHENHYDRAMINE HCL 25 MG
25 TABLET ORAL ONCE
Status: COMPLETED | OUTPATIENT
Start: 2020-10-01 | End: 2020-10-01

## 2020-10-01 ENCOUNTER — HOSPITAL ENCOUNTER (OUTPATIENT)
Dept: INFUSION CENTER | Facility: HOSPITAL | Age: 40
Discharge: HOME | End: 2020-10-01
Payer: COMMERCIAL

## 2020-10-01 VITALS
SYSTOLIC BLOOD PRESSURE: 110 MMHG | HEART RATE: 76 BPM | TEMPERATURE: 97.8 F | RESPIRATION RATE: 20 BRPM | WEIGHT: 220.46 LBS | BODY MASS INDEX: 33.52 KG/M2 | DIASTOLIC BLOOD PRESSURE: 74 MMHG

## 2020-10-01 PROCEDURE — 96413 CHEMO IV INFUSION 1 HR: CPT

## 2020-10-01 PROCEDURE — 96415 CHEMO IV INFUSION ADDL HR: CPT

## 2020-10-01 RX ADMIN — SODIUM CHLORIDE 20 ML/HR: 0.9 INJECTION, SOLUTION INTRAVENOUS at 13:21

## 2020-10-01 RX ADMIN — INFLIXIMAB 1000 MG: 100 INJECTION, POWDER, LYOPHILIZED, FOR SOLUTION INTRAVENOUS at 13:57

## 2020-10-01 RX ADMIN — DIPHENHYDRAMINE HCL 25 MG: 25 TABLET, COATED ORAL at 13:21

## 2020-10-01 RX ADMIN — ACETAMINOPHEN 650 MG: 325 TABLET, FILM COATED ORAL at 13:21

## 2020-11-10 RX ORDER — DIPHENHYDRAMINE HCL 25 MG
25 TABLET ORAL ONCE
Status: COMPLETED | OUTPATIENT
Start: 2020-11-12 | End: 2020-11-12

## 2020-11-10 RX ORDER — SODIUM CHLORIDE 9 MG/ML
20 INJECTION, SOLUTION INTRAVENOUS ONCE
Status: COMPLETED | OUTPATIENT
Start: 2020-11-12 | End: 2020-11-12

## 2020-11-10 RX ORDER — ACETAMINOPHEN 325 MG/1
650 TABLET ORAL ONCE
Status: COMPLETED | OUTPATIENT
Start: 2020-11-12 | End: 2020-11-12

## 2020-11-11 ENCOUNTER — TELEPHONE (OUTPATIENT)
Dept: UROLOGY | Facility: AMBULATORY SURGERY CENTER | Age: 40
End: 2020-11-11

## 2020-11-12 ENCOUNTER — HOSPITAL ENCOUNTER (OUTPATIENT)
Dept: INFUSION CENTER | Facility: HOSPITAL | Age: 40
Discharge: HOME | End: 2020-11-12
Payer: COMMERCIAL

## 2020-11-12 ENCOUNTER — LAB (OUTPATIENT)
Dept: LAB | Age: 40
End: 2020-11-12
Payer: COMMERCIAL

## 2020-11-12 VITALS
DIASTOLIC BLOOD PRESSURE: 66 MMHG | SYSTOLIC BLOOD PRESSURE: 104 MMHG | HEART RATE: 70 BPM | RESPIRATION RATE: 20 BRPM | BODY MASS INDEX: 33.05 KG/M2 | WEIGHT: 217.37 LBS | TEMPERATURE: 97.6 F

## 2020-11-12 DIAGNOSIS — N28.9 RENAL LESION: ICD-10-CM

## 2020-11-12 LAB
ANION GAP SERPL CALCULATED.3IONS-SCNC: 3 MMOL/L (ref 4–13)
BUN SERPL-MCNC: 12 MG/DL (ref 5–25)
CALCIUM SERPL-MCNC: 9 MG/DL (ref 8.3–10.1)
CHLORIDE SERPL-SCNC: 111 MMOL/L (ref 100–108)
CO2 SERPL-SCNC: 28 MMOL/L (ref 21–32)
CREAT SERPL-MCNC: 0.87 MG/DL (ref 0.6–1.3)
GFR SERPL CREATININE-BSD FRML MDRD: 108 ML/MIN/1.73SQ M
GLUCOSE P FAST SERPL-MCNC: 82 MG/DL (ref 65–99)
POTASSIUM SERPL-SCNC: 4.5 MMOL/L (ref 3.5–5.3)
SODIUM SERPL-SCNC: 142 MMOL/L (ref 136–145)

## 2020-11-12 PROCEDURE — 96413 CHEMO IV INFUSION 1 HR: CPT

## 2020-11-12 PROCEDURE — 96415 CHEMO IV INFUSION ADDL HR: CPT

## 2020-11-12 PROCEDURE — 36415 COLL VENOUS BLD VENIPUNCTURE: CPT

## 2020-11-12 PROCEDURE — 80048 BASIC METABOLIC PNL TOTAL CA: CPT

## 2020-11-12 RX ADMIN — SODIUM CHLORIDE 20 ML/HR: 0.9 INJECTION, SOLUTION INTRAVENOUS at 13:14

## 2020-11-12 RX ADMIN — INFLIXIMAB 1000 MG: 100 INJECTION, POWDER, LYOPHILIZED, FOR SOLUTION INTRAVENOUS at 14:07

## 2020-11-12 RX ADMIN — ACETAMINOPHEN 650 MG: 325 TABLET, FILM COATED ORAL at 13:15

## 2020-11-12 RX ADMIN — DIPHENHYDRAMINE HCL 25 MG: 25 TABLET, COATED ORAL at 13:15

## 2020-11-18 ENCOUNTER — HOSPITAL ENCOUNTER (OUTPATIENT)
Dept: CT IMAGING | Facility: HOSPITAL | Age: 40
Discharge: HOME | End: 2020-11-18
Payer: COMMERCIAL

## 2020-11-18 DIAGNOSIS — N28.9 RENAL LESION: ICD-10-CM

## 2020-11-18 PROCEDURE — 74178 CT ABD&PLV WO CNTR FLWD CNTR: CPT

## 2020-11-18 RX ADMIN — IOHEXOL 120 ML: 350 INJECTION, SOLUTION INTRAVENOUS at 21:56

## 2020-11-23 ENCOUNTER — TELEPHONE (OUTPATIENT)
Dept: UROLOGY | Facility: AMBULATORY SURGERY CENTER | Age: 40
End: 2020-11-23

## 2020-12-07 ENCOUNTER — TELEMEDICINE (OUTPATIENT)
Dept: UROLOGY | Facility: AMBULATORY SURGERY CENTER | Age: 40
End: 2020-12-07
Payer: COMMERCIAL

## 2020-12-07 DIAGNOSIS — N28.9 RENAL LESION: Primary | ICD-10-CM

## 2020-12-07 PROCEDURE — 99213 OFFICE O/P EST LOW 20 MIN: CPT | Performed by: NURSE PRACTITIONER

## 2020-12-08 ENCOUNTER — TELEPHONE (OUTPATIENT)
Dept: UROLOGY | Facility: AMBULATORY SURGERY CENTER | Age: 40
End: 2020-12-08

## 2020-12-08 DIAGNOSIS — N28.9 RENAL LESION: Primary | ICD-10-CM

## 2020-12-22 RX ORDER — DIPHENHYDRAMINE HCL 25 MG
25 TABLET ORAL ONCE
Status: COMPLETED | OUTPATIENT
Start: 2020-12-24 | End: 2020-12-24

## 2020-12-22 RX ORDER — SODIUM CHLORIDE 9 MG/ML
20 INJECTION, SOLUTION INTRAVENOUS ONCE
Status: COMPLETED | OUTPATIENT
Start: 2020-12-24 | End: 2020-12-24

## 2020-12-22 RX ORDER — ACETAMINOPHEN 325 MG/1
650 TABLET ORAL ONCE
Status: COMPLETED | OUTPATIENT
Start: 2020-12-24 | End: 2020-12-24

## 2020-12-24 ENCOUNTER — HOSPITAL ENCOUNTER (OUTPATIENT)
Dept: MRI IMAGING | Facility: HOSPITAL | Age: 40
Discharge: HOME | End: 2020-12-24
Payer: COMMERCIAL

## 2020-12-24 ENCOUNTER — HOSPITAL ENCOUNTER (OUTPATIENT)
Dept: INFUSION CENTER | Facility: HOSPITAL | Age: 40
Discharge: HOME | End: 2020-12-24
Payer: COMMERCIAL

## 2020-12-24 VITALS
BODY MASS INDEX: 33.19 KG/M2 | SYSTOLIC BLOOD PRESSURE: 125 MMHG | DIASTOLIC BLOOD PRESSURE: 82 MMHG | TEMPERATURE: 97.4 F | HEART RATE: 74 BPM | WEIGHT: 218.26 LBS | RESPIRATION RATE: 20 BRPM

## 2020-12-24 DIAGNOSIS — N28.9 RENAL LESION: ICD-10-CM

## 2020-12-24 PROCEDURE — 74183 MRI ABD W/O CNTR FLWD CNTR: CPT

## 2020-12-24 PROCEDURE — 96415 CHEMO IV INFUSION ADDL HR: CPT

## 2020-12-24 PROCEDURE — A9585 GADOBUTROL INJECTION: HCPCS | Performed by: NURSE PRACTITIONER

## 2020-12-24 PROCEDURE — G1004 CDSM NDSC: HCPCS

## 2020-12-24 PROCEDURE — 96413 CHEMO IV INFUSION 1 HR: CPT

## 2020-12-24 RX ADMIN — SODIUM CHLORIDE 20 ML/HR: 0.9 INJECTION, SOLUTION INTRAVENOUS at 10:37

## 2020-12-24 RX ADMIN — ACETAMINOPHEN 650 MG: 325 TABLET, FILM COATED ORAL at 10:36

## 2020-12-24 RX ADMIN — DIPHENHYDRAMINE HCL 25 MG: 25 TABLET, COATED ORAL at 10:36

## 2020-12-24 RX ADMIN — GADOBUTROL 9 ML: 604.72 INJECTION INTRAVENOUS at 18:09

## 2020-12-24 RX ADMIN — INFLIXIMAB 1000 MG: 100 INJECTION, POWDER, LYOPHILIZED, FOR SOLUTION INTRAVENOUS at 11:20

## 2021-02-02 RX ORDER — ACETAMINOPHEN 325 MG/1
650 TABLET ORAL ONCE
Status: COMPLETED | OUTPATIENT
Start: 2021-02-04 | End: 2021-02-04

## 2021-02-02 RX ORDER — DIPHENHYDRAMINE HCL 25 MG
25 TABLET ORAL ONCE
Status: COMPLETED | OUTPATIENT
Start: 2021-02-04 | End: 2021-02-04

## 2021-02-02 RX ORDER — SODIUM CHLORIDE 9 MG/ML
20 INJECTION, SOLUTION INTRAVENOUS ONCE
Status: COMPLETED | OUTPATIENT
Start: 2021-02-04 | End: 2021-02-04

## 2021-02-04 ENCOUNTER — HOSPITAL ENCOUNTER (OUTPATIENT)
Dept: INFUSION CENTER | Facility: HOSPITAL | Age: 41
Discharge: HOME | End: 2021-02-04
Payer: COMMERCIAL

## 2021-02-04 VITALS
DIASTOLIC BLOOD PRESSURE: 61 MMHG | BODY MASS INDEX: 32.98 KG/M2 | WEIGHT: 216.93 LBS | HEART RATE: 74 BPM | SYSTOLIC BLOOD PRESSURE: 125 MMHG | TEMPERATURE: 97 F | RESPIRATION RATE: 20 BRPM

## 2021-02-04 PROCEDURE — 96415 CHEMO IV INFUSION ADDL HR: CPT

## 2021-02-04 PROCEDURE — 96413 CHEMO IV INFUSION 1 HR: CPT

## 2021-02-04 RX ADMIN — INFLIXIMAB 1000 MG: 100 INJECTION, POWDER, LYOPHILIZED, FOR SOLUTION INTRAVENOUS at 13:19

## 2021-02-04 RX ADMIN — ACETAMINOPHEN 650 MG: 325 TABLET, FILM COATED ORAL at 12:48

## 2021-02-04 RX ADMIN — DIPHENHYDRAMINE HCL 25 MG: 25 TABLET ORAL at 12:48

## 2021-02-04 RX ADMIN — SODIUM CHLORIDE 20 ML/HR: 0.9 INJECTION, SOLUTION INTRAVENOUS at 12:48

## 2021-02-04 NOTE — PLAN OF CARE
Problem: Potential for Falls  Goal: Patient will remain free of falls  Description: INTERVENTIONS:  - Assess patient frequently for physical needs  -  Identify cognitive and physical deficits and behaviors that affect risk of falls    -  [place] fall precautions as indicated by assessment   - Educate patient/family on patient safety including physical limitations  - Instruct patient to call for assistance with activity based on assessment  - Modify environment to reduce risk of injury  - Consider OT/PT consult to assist with strengthening/mobility  Outcome: Progressing

## 2021-03-10 DIAGNOSIS — Z23 ENCOUNTER FOR IMMUNIZATION: ICD-10-CM

## 2021-03-16 RX ORDER — DIPHENHYDRAMINE HCL 25 MG
25 TABLET ORAL ONCE
Status: COMPLETED | OUTPATIENT
Start: 2021-03-18 | End: 2021-03-18

## 2021-03-16 RX ORDER — ACETAMINOPHEN 325 MG/1
650 TABLET ORAL ONCE
Status: COMPLETED | OUTPATIENT
Start: 2021-03-18 | End: 2021-03-18

## 2021-03-16 RX ORDER — SODIUM CHLORIDE 9 MG/ML
20 INJECTION, SOLUTION INTRAVENOUS ONCE
Status: COMPLETED | OUTPATIENT
Start: 2021-03-18 | End: 2021-03-18

## 2021-03-18 ENCOUNTER — HOSPITAL ENCOUNTER (OUTPATIENT)
Dept: INFUSION CENTER | Facility: HOSPITAL | Age: 41
Discharge: HOME | End: 2021-03-18
Payer: COMMERCIAL

## 2021-03-18 VITALS
RESPIRATION RATE: 20 BRPM | TEMPERATURE: 97.8 F | WEIGHT: 219.36 LBS | DIASTOLIC BLOOD PRESSURE: 80 MMHG | HEART RATE: 78 BPM | BODY MASS INDEX: 33.35 KG/M2 | SYSTOLIC BLOOD PRESSURE: 112 MMHG

## 2021-03-18 PROCEDURE — 96415 CHEMO IV INFUSION ADDL HR: CPT

## 2021-03-18 PROCEDURE — 96413 CHEMO IV INFUSION 1 HR: CPT

## 2021-03-18 RX ADMIN — SODIUM CHLORIDE 20 ML/HR: 0.9 INJECTION, SOLUTION INTRAVENOUS at 12:11

## 2021-03-18 RX ADMIN — ACETAMINOPHEN 650 MG: 325 TABLET, FILM COATED ORAL at 12:10

## 2021-03-18 RX ADMIN — INFLIXIMAB 1000 MG: 100 INJECTION, POWDER, LYOPHILIZED, FOR SOLUTION INTRAVENOUS at 12:40

## 2021-03-18 RX ADMIN — DIPHENHYDRAMINE HCL 25 MG: 25 TABLET ORAL at 12:10

## 2021-03-18 NOTE — PLAN OF CARE
Problem: Potential for Falls  Goal: Patient will remain free of falls  Description: INTERVENTIONS:  - Assess patient frequently for physical needs  -  Identify cognitive and physical deficits and behaviors that affect risk of falls    -  [place] fall precautions as indicated by assessment   - Educate patient/family on patient safety including physical limitations  - Instruct patient to call for assistance with activity based on assessment  - Modify environment to reduce risk of injury  - Consider OT/PT consult to assist with strengthening/mobility  Outcome: Progressing

## 2021-03-30 ENCOUNTER — IMMUNIZATIONS (OUTPATIENT)
Dept: FAMILY MEDICINE CLINIC | Facility: HOSPITAL | Age: 41
End: 2021-03-30

## 2021-03-30 DIAGNOSIS — Z23 ENCOUNTER FOR IMMUNIZATION: Primary | ICD-10-CM

## 2021-03-30 PROCEDURE — 0011A SARS-COV-2 / COVID-19 MRNA VACCINE (MODERNA) 100 MCG: CPT

## 2021-03-30 PROCEDURE — 91301 SARS-COV-2 / COVID-19 MRNA VACCINE (MODERNA) 100 MCG: CPT

## 2021-04-27 RX ORDER — ACETAMINOPHEN 325 MG/1
650 TABLET ORAL ONCE
Status: COMPLETED | OUTPATIENT
Start: 2021-04-29 | End: 2021-04-29

## 2021-04-27 RX ORDER — DIPHENHYDRAMINE HCL 25 MG
25 TABLET ORAL ONCE
Status: COMPLETED | OUTPATIENT
Start: 2021-04-29 | End: 2021-04-29

## 2021-04-27 RX ORDER — SODIUM CHLORIDE 9 MG/ML
20 INJECTION, SOLUTION INTRAVENOUS ONCE
Status: COMPLETED | OUTPATIENT
Start: 2021-04-29 | End: 2021-04-29

## 2021-04-29 ENCOUNTER — HOSPITAL ENCOUNTER (OUTPATIENT)
Dept: INFUSION CENTER | Facility: HOSPITAL | Age: 41
Discharge: HOME | End: 2021-04-29
Payer: COMMERCIAL

## 2021-04-29 VITALS
WEIGHT: 220.46 LBS | DIASTOLIC BLOOD PRESSURE: 58 MMHG | BODY MASS INDEX: 33.52 KG/M2 | SYSTOLIC BLOOD PRESSURE: 127 MMHG | RESPIRATION RATE: 18 BRPM | HEART RATE: 74 BPM | TEMPERATURE: 98.4 F

## 2021-04-29 PROCEDURE — 96413 CHEMO IV INFUSION 1 HR: CPT

## 2021-04-29 PROCEDURE — 96415 CHEMO IV INFUSION ADDL HR: CPT

## 2021-04-29 RX ADMIN — ACETAMINOPHEN 650 MG: 325 TABLET ORAL at 12:29

## 2021-04-29 RX ADMIN — INFLIXIMAB 1000 MG: 100 INJECTION, POWDER, LYOPHILIZED, FOR SOLUTION INTRAVENOUS at 12:58

## 2021-04-29 RX ADMIN — DIPHENHYDRAMINE HCL 25 MG: 25 TABLET ORAL at 12:29

## 2021-04-29 RX ADMIN — SODIUM CHLORIDE 20 ML/HR: 0.9 INJECTION, SOLUTION INTRAVENOUS at 12:29

## 2021-04-29 NOTE — PLAN OF CARE
Problem: Potential for Falls  Goal: Patient will remain free of falls  Description: INTERVENTIONS:  - Assess patient frequently for physical needs  -  Identify cognitive and physical deficits and behaviors that affect risk of falls    -  [place] fall precautions as indicated by assessment   - Educate patient/family on patient safety including physical limitations  - Instruct patient to call for assistance with activity based on assessment  - Modify environment to reduce risk of injury  - Consider OT/PT consult to assist with strengthening/mobility  4/29/2021 1232 by Laxmi Newsome RN  Outcome: Progressing  4/29/2021 1227 by Laxmi Newsome RN  Outcome: Progressing

## 2021-04-29 NOTE — PLAN OF CARE
Problem: Potential for Falls  Goal: Patient will remain free of falls  Description: INTERVENTIONS:  - Assess patient frequently for physical needs  -  Identify cognitive and physical deficits and behaviors that affect risk of falls    -  [place] fall precautions as indicated by assessment   - Educate patient/family on patient safety including physical limitations  - Instruct patient to call for assistance with activity based on assessment  - Modify environment to reduce risk of injury  - Consider OT/PT consult to assist with strengthening/mobility  Outcome: Progressing

## 2021-04-30 ENCOUNTER — IMMUNIZATIONS (OUTPATIENT)
Dept: FAMILY MEDICINE CLINIC | Facility: HOSPITAL | Age: 41
End: 2021-04-30

## 2021-04-30 DIAGNOSIS — Z23 ENCOUNTER FOR IMMUNIZATION: Primary | ICD-10-CM

## 2021-04-30 PROCEDURE — 91301 SARS-COV-2 / COVID-19 MRNA VACCINE (MODERNA) 100 MCG: CPT

## 2021-04-30 PROCEDURE — 0012A SARS-COV-2 / COVID-19 MRNA VACCINE (MODERNA) 100 MCG: CPT

## 2021-06-08 RX ORDER — SODIUM CHLORIDE 9 MG/ML
20 INJECTION, SOLUTION INTRAVENOUS ONCE
Status: COMPLETED | OUTPATIENT
Start: 2021-06-10 | End: 2021-06-10

## 2021-06-08 RX ORDER — ACETAMINOPHEN 325 MG/1
650 TABLET ORAL ONCE
Status: COMPLETED | OUTPATIENT
Start: 2021-06-10 | End: 2021-06-10

## 2021-06-08 RX ORDER — DIPHENHYDRAMINE HCL 25 MG
25 TABLET ORAL ONCE
Status: COMPLETED | OUTPATIENT
Start: 2021-06-10 | End: 2021-06-10

## 2021-06-10 ENCOUNTER — HOSPITAL ENCOUNTER (OUTPATIENT)
Dept: INFUSION CENTER | Facility: HOSPITAL | Age: 41
Discharge: HOME | End: 2021-06-10
Payer: COMMERCIAL

## 2021-06-10 VITALS
DIASTOLIC BLOOD PRESSURE: 75 MMHG | HEART RATE: 70 BPM | WEIGHT: 215.17 LBS | RESPIRATION RATE: 18 BRPM | BODY MASS INDEX: 32.72 KG/M2 | TEMPERATURE: 97.3 F | SYSTOLIC BLOOD PRESSURE: 125 MMHG

## 2021-06-10 PROCEDURE — 96413 CHEMO IV INFUSION 1 HR: CPT

## 2021-06-10 PROCEDURE — 96415 CHEMO IV INFUSION ADDL HR: CPT

## 2021-06-10 RX ADMIN — ACETAMINOPHEN 650 MG: 325 TABLET, FILM COATED ORAL at 12:52

## 2021-06-10 RX ADMIN — DIPHENHYDRAMINE HCL 25 MG: 25 TABLET ORAL at 12:52

## 2021-06-10 RX ADMIN — SODIUM CHLORIDE 20 ML/HR: 0.9 INJECTION, SOLUTION INTRAVENOUS at 12:58

## 2021-06-10 RX ADMIN — INFLIXIMAB 1000 MG: 100 INJECTION, POWDER, LYOPHILIZED, FOR SOLUTION INTRAVENOUS at 13:31

## 2021-06-10 NOTE — PLAN OF CARE
Problem: Potential for Falls  Goal: Patient will remain free of falls  Description: INTERVENTIONS:  - Assess patient frequently for physical needs  -  Identify cognitive and physical deficits and behaviors that affect risk of falls    -  [place] fall precautions as indicated by assessment   - Educate patient/family on patient safety including physical limitations  - Instruct patient to call for assistance with activity based on assessment  - Modify environment to reduce risk of injury  - Consider OT/PT consult to assist with strengthening/mobility  Outcome: Progressing

## 2021-07-20 RX ORDER — SODIUM CHLORIDE 9 MG/ML
20 INJECTION, SOLUTION INTRAVENOUS ONCE
Status: COMPLETED | OUTPATIENT
Start: 2021-07-22 | End: 2021-07-22

## 2021-07-20 RX ORDER — ACETAMINOPHEN 325 MG/1
650 TABLET ORAL ONCE
Status: COMPLETED | OUTPATIENT
Start: 2021-07-22 | End: 2021-07-22

## 2021-07-20 RX ORDER — DIPHENHYDRAMINE HCL 25 MG
25 TABLET ORAL ONCE
Status: COMPLETED | OUTPATIENT
Start: 2021-07-22 | End: 2021-07-22

## 2021-07-22 ENCOUNTER — HOSPITAL ENCOUNTER (OUTPATIENT)
Dept: INFUSION CENTER | Facility: HOSPITAL | Age: 41
Discharge: HOME | End: 2021-07-22
Payer: COMMERCIAL

## 2021-07-22 VITALS
SYSTOLIC BLOOD PRESSURE: 111 MMHG | BODY MASS INDEX: 33.52 KG/M2 | DIASTOLIC BLOOD PRESSURE: 64 MMHG | RESPIRATION RATE: 16 BRPM | WEIGHT: 220.46 LBS | HEART RATE: 89 BPM | TEMPERATURE: 98.6 F

## 2021-07-22 PROCEDURE — 96415 CHEMO IV INFUSION ADDL HR: CPT

## 2021-07-22 PROCEDURE — 96413 CHEMO IV INFUSION 1 HR: CPT

## 2021-07-22 RX ADMIN — SODIUM CHLORIDE 20 ML/HR: 0.9 INJECTION, SOLUTION INTRAVENOUS at 13:01

## 2021-07-22 RX ADMIN — ACETAMINOPHEN 650 MG: 325 TABLET, FILM COATED ORAL at 13:02

## 2021-07-22 RX ADMIN — DIPHENHYDRAMINE HCL 25 MG: 25 TABLET ORAL at 13:02

## 2021-07-22 RX ADMIN — INFLIXIMAB 1000 MG: 100 INJECTION, POWDER, LYOPHILIZED, FOR SOLUTION INTRAVENOUS at 13:40

## 2021-09-02 ENCOUNTER — HOSPITAL ENCOUNTER (OUTPATIENT)
Dept: INFUSION CENTER | Facility: HOSPITAL | Age: 41
Discharge: HOME | End: 2021-09-02
Payer: COMMERCIAL

## 2021-09-02 VITALS
TEMPERATURE: 97.4 F | DIASTOLIC BLOOD PRESSURE: 67 MMHG | BODY MASS INDEX: 33.22 KG/M2 | WEIGHT: 218.48 LBS | SYSTOLIC BLOOD PRESSURE: 129 MMHG | HEART RATE: 71 BPM | RESPIRATION RATE: 18 BRPM

## 2021-09-02 PROCEDURE — 96415 CHEMO IV INFUSION ADDL HR: CPT

## 2021-09-02 PROCEDURE — 96413 CHEMO IV INFUSION 1 HR: CPT

## 2021-09-02 RX ORDER — ACETAMINOPHEN 325 MG/1
650 TABLET ORAL ONCE
Status: COMPLETED | OUTPATIENT
Start: 2021-09-02 | End: 2021-09-02

## 2021-09-02 RX ORDER — SODIUM CHLORIDE 9 MG/ML
20 INJECTION, SOLUTION INTRAVENOUS ONCE
Status: DISCONTINUED | OUTPATIENT
Start: 2021-09-02 | End: 2021-09-02

## 2021-09-02 RX ORDER — DIPHENHYDRAMINE HCL 25 MG
25 TABLET ORAL ONCE
Status: COMPLETED | OUTPATIENT
Start: 2021-09-02 | End: 2021-09-02

## 2021-09-02 RX ADMIN — ACETAMINOPHEN 650 MG: 325 TABLET, FILM COATED ORAL at 13:04

## 2021-09-02 RX ADMIN — INFLIXIMAB 1000 MG: 100 INJECTION, POWDER, LYOPHILIZED, FOR SOLUTION INTRAVENOUS at 13:40

## 2021-09-02 RX ADMIN — DIPHENHYDRAMINE HCL 25 MG: 25 TABLET ORAL at 13:04

## 2021-10-12 RX ORDER — ACETAMINOPHEN 325 MG/1
650 TABLET ORAL ONCE
Status: COMPLETED | OUTPATIENT
Start: 2021-10-12 | End: 2021-10-14

## 2021-10-12 RX ORDER — DIPHENHYDRAMINE HCL 25 MG
25 TABLET ORAL ONCE
Status: COMPLETED | OUTPATIENT
Start: 2021-10-12 | End: 2021-10-14

## 2021-10-14 ENCOUNTER — HOSPITAL ENCOUNTER (OUTPATIENT)
Dept: INFUSION CENTER | Facility: HOSPITAL | Age: 41
Discharge: HOME | End: 2021-10-14
Payer: COMMERCIAL

## 2021-10-14 VITALS
WEIGHT: 216.93 LBS | DIASTOLIC BLOOD PRESSURE: 69 MMHG | BODY MASS INDEX: 32.98 KG/M2 | RESPIRATION RATE: 18 BRPM | TEMPERATURE: 97.6 F | HEART RATE: 82 BPM | SYSTOLIC BLOOD PRESSURE: 116 MMHG

## 2021-10-14 PROCEDURE — 96413 CHEMO IV INFUSION 1 HR: CPT

## 2021-10-14 PROCEDURE — 96415 CHEMO IV INFUSION ADDL HR: CPT

## 2021-10-14 RX ADMIN — INFLIXIMAB 1000 MG: 100 INJECTION, POWDER, LYOPHILIZED, FOR SOLUTION INTRAVENOUS at 14:11

## 2021-10-14 RX ADMIN — DIPHENHYDRAMINE HCL 25 MG: 25 TABLET ORAL at 13:29

## 2021-10-14 RX ADMIN — ACETAMINOPHEN 650 MG: 325 TABLET ORAL at 13:28

## 2021-11-23 RX ORDER — DIPHENHYDRAMINE HCL 25 MG
25 TABLET ORAL ONCE
Status: COMPLETED | OUTPATIENT
Start: 2021-11-26 | End: 2021-11-26

## 2021-11-23 RX ORDER — ACETAMINOPHEN 325 MG/1
650 TABLET ORAL ONCE
Status: COMPLETED | OUTPATIENT
Start: 2021-11-26 | End: 2021-11-26

## 2021-11-26 ENCOUNTER — HOSPITAL ENCOUNTER (OUTPATIENT)
Dept: INFUSION CENTER | Facility: HOSPITAL | Age: 41
Discharge: HOME | End: 2021-11-26
Payer: COMMERCIAL

## 2021-11-26 VITALS
TEMPERATURE: 96.2 F | SYSTOLIC BLOOD PRESSURE: 118 MMHG | HEART RATE: 79 BPM | DIASTOLIC BLOOD PRESSURE: 68 MMHG | WEIGHT: 220.9 LBS | BODY MASS INDEX: 33.59 KG/M2 | RESPIRATION RATE: 18 BRPM

## 2021-11-26 PROCEDURE — 96415 CHEMO IV INFUSION ADDL HR: CPT

## 2021-11-26 PROCEDURE — 96413 CHEMO IV INFUSION 1 HR: CPT

## 2021-11-26 RX ADMIN — DIPHENHYDRAMINE HCL 25 MG: 25 TABLET ORAL at 12:58

## 2021-11-26 RX ADMIN — ACETAMINOPHEN 650 MG: 325 TABLET, FILM COATED ORAL at 12:58

## 2021-11-26 RX ADMIN — INFLIXIMAB 1000 MG: 100 INJECTION, POWDER, LYOPHILIZED, FOR SOLUTION INTRAVENOUS at 13:26

## 2022-01-06 ENCOUNTER — HOSPITAL ENCOUNTER (OUTPATIENT)
Dept: INFUSION CENTER | Facility: HOSPITAL | Age: 42
End: 2022-01-06

## 2022-01-14 RX ORDER — DIPHENHYDRAMINE HCL 25 MG
25 TABLET ORAL ONCE
Status: DISCONTINUED | OUTPATIENT
Start: 2022-01-17 | End: 2022-01-20 | Stop reason: HOSPADM

## 2022-01-14 RX ORDER — ACETAMINOPHEN 325 MG/1
650 TABLET ORAL ONCE
Status: DISCONTINUED | OUTPATIENT
Start: 2022-01-17 | End: 2022-01-20 | Stop reason: HOSPADM

## 2022-01-17 ENCOUNTER — HOSPITAL ENCOUNTER (OUTPATIENT)
Dept: INFUSION CENTER | Facility: HOSPITAL | Age: 42
Discharge: HOME | End: 2022-01-17

## 2022-03-23 ENCOUNTER — OFFICE VISIT (OUTPATIENT)
Dept: GASTROENTEROLOGY | Facility: CLINIC | Age: 42
End: 2022-03-23
Payer: COMMERCIAL

## 2022-03-23 VITALS
SYSTOLIC BLOOD PRESSURE: 109 MMHG | WEIGHT: 215 LBS | BODY MASS INDEX: 32.58 KG/M2 | DIASTOLIC BLOOD PRESSURE: 77 MMHG | HEIGHT: 68 IN | TEMPERATURE: 96.8 F

## 2022-03-23 DIAGNOSIS — K50.90 CROHN'S DISEASE WITHOUT COMPLICATION, UNSPECIFIED GASTROINTESTINAL TRACT LOCATION (HCC): Primary | ICD-10-CM

## 2022-03-23 DIAGNOSIS — K50.10 CROHN'S DISEASE OF LARGE INTESTINE WITHOUT COMPLICATION (HCC): ICD-10-CM

## 2022-03-23 PROCEDURE — 99204 OFFICE O/P NEW MOD 45 MIN: CPT | Performed by: INTERNAL MEDICINE

## 2022-03-23 RX ORDER — ACETAMINOPHEN 325 MG/1
650 TABLET ORAL ONCE
OUTPATIENT
Start: 2022-06-02

## 2022-03-23 RX ORDER — SODIUM CHLORIDE 9 MG/ML
20 INJECTION, SOLUTION INTRAVENOUS ONCE
OUTPATIENT
Start: 2022-06-02

## 2022-03-23 RX ORDER — DIPHENHYDRAMINE HCL 25 MG
25 TABLET ORAL ONCE
OUTPATIENT
Start: 2022-06-02

## 2022-03-23 RX ORDER — METHYLPREDNISOLONE SODIUM SUCCINATE 40 MG/ML
40 INJECTION, POWDER, LYOPHILIZED, FOR SOLUTION INTRAMUSCULAR; INTRAVENOUS ONCE
OUTPATIENT
Start: 2022-06-02

## 2022-03-23 NOTE — PROGRESS NOTES
[facility] - Outpatient Consultation  Jhoan Haynes 39 y o  male MRN: [id number]  Encounter: [id number]        ASSESSMENT AND PLAN     1  Crohn's disease without complication, unspecified gastrointestinal tract location [facility])  · Diagnosed in 2001 when he presented with abdominal pain, diarrhea   · On remicade since many years now   · In 2020 his remicade was changed from 5mg Q8 weeks to 10mg Q6 weeks because of frequent flares  · Last colonoscopy was in June 2020 which showed endoscopic remission (report not available as it was at an outside facility- patient was following with Dr Michelle Olmos until now but had to change due to insurance reasons  · Will re-check blood work  · Will repeat colonoscopy  · No extra-intestinal manifestations currently   · Will obtain infliximab levels prior to next infusion which he reports is on 04/21  After that we will order his infusions to be started on 06/02  - INFLIXIMAB LEVEL AND ANTI-DRUG ANTIBODY FOR IBD ([facility]); Future  - CBC and differential; Future  - Comprehensive metabolic panel; Future  - Chronic Hepatitis Panel; Future  - Quantiferon TB Gold Plus; Future  - Hepatitis B surface antibody; Future  - C-reactive protein; Future  - INFLIXIMAB LEVEL AND ANTI-DRUG ANTIBODY FOR IBD ([facility])  - Colonoscopy; Future    Orders Placed This Encounter   Procedures    INFLIXIMAB LEVEL AND ANTI-DRUG ANTIBODY FOR IBD ([facility])    CBC and differential    Comprehensive metabolic panel    Chronic Hepatitis Panel    Quantiferon TB Gold Plus    Hepatitis B surface antibody    C-reactive protein    Colonoscopy         HISTORY OF PRESENT ILLNESS     Patient is a 59-year-old male with a past medical history of Crohn's disease diagnosed in 2001 currently on Remicade  He was following with Dr Michelle Olmos but had to transfer care here due to a change in insurance  He reports that he is doing pretty well at this time and denies any symptoms   He did have abdominal pain and diarrhea 3 weeks ago which lasted for a few days and he took a few imodium pills for the same  He has long standing crohn's which was diagnosed when he presented with diarrhea and abdominal pain in 2001  He had a colonoscopy at that time  He has never had any abdominal surgeries or hospital admissions for his crohn's disease  His last colonoscopy was in June of 2020 which he reports showed endoscopic remission  He does not have any extra-intestinal manifestations of crohn's disease currently  He denies any skin, joint, eye symptoms and denies any perianal pain, swelling or skin tags  He reports he does not have any dysphagia, heart burn, abdominal pain, nausea or vomiting currently  He reports drinking alcohol occasionally but does not smoke  REVIEW OF SYSTEMS     CONSTITUTIONAL: Denies any fever, chills, rigors, and weight loss  HEENT: No earache or tinnitus  Denies hearing loss or visual disturbances  CARDIOVASCULAR: No chest pain or palpitations  RESPIRATORY: Denies any cough, hemoptysis, shortness of breath or dyspnea on exertion  GASTROINTESTINAL: As noted in the History of Present Illness  GENITOURINARY: No problems with urination  Denies any hematuria or dysuria  NEUROLOGIC: No dizziness or vertigo, denies headaches  MUSCULOSKELETAL: Denies any muscle or joint pain  SKIN: Denies skin rashes or itching  ENDOCRINE: Denies excessive thirst  Denies intolerance to heat or cold  PSYCHOSOCIAL: Denies depression or anxiety  Denies any recent memory loss  Historical information     Past Medical History:   Diagnosis Date    Arthritis     [facility]hn's disease ([facility] [facility] )     Eczema     Rheumatoid arthritis ([facility] [place] [facility] )      Past Surgical History:   Procedure Laterality Date    COLONOSCOPY N/A 1/18/2016    Procedure: COLONOSCOPY;  Surgeon: Nataliya Barnard MD;  Location: [facility];   Service:      Social History   Social History     Substance and Sexual Activity   Alcohol Use Yes    Alcohol/week: 10 0 standard drinks    Types: 10 Cans of beer per week     Social History     Substance and Sexual Activity   Drug Use No     Social History     Tobacco Use   Smoking Status Never Smoker   Smokeless Tobacco Never Used     History reviewed  No pertinent family history  Allergies       Current Outpatient Medications:     dupilumab (DUPIXENT) subcutaneous injection    InFLIXimab (REMICADE IV)    clobetasol (TEMOVATE) 0 05 % GEL    Allergies   Allergen Reactions    Shellfish-Derived Products - Food Allergy GI Intolerance           Objective assessment       Blood pressure 109/77, temperature (!) 96 8 °F (36 °C), temperature source Tympanic, height 5' 8" (1 727 m), weight 97 5 kg (215 lb)  Body mass index is 32 69 kg/m²  PHYSICAL EXAM:         General Appearance:   Alert, cooperative, no distress   HEENT:   Normocephalic, atraumatic, anicteric  Neck:  Supple, symmetrical, trachea midline   Lungs:   Clear to auscultation bilaterally; no rales, rhonchi or wheezing; respirations unlabored    Heart[de-identified]   Regular rate and rhythm; no murmur, rub, or gallop  Abdomen:   Soft, non-tender, non-distended; normal bowel sounds; no masses, no organomegaly    Genitalia:   Deferred    Rectal:   Deferred    Extremities:  No cyanosis, clubbing or edema    Pulses:  2+ and symmetric    Skin:  No jaundice, rashes, or lesions    Lymph nodes:  No palpable cervical lymphadenopathy        Lab Results:      No visits with results within 1 Day(s) from this visit  Latest known visit with results is:   Lab on 11/12/2020   Component Date Value    Sodium 11/12/2020 142     Potassium 11/12/2020 4 5     Chloride 11/12/2020 111*    CO2 11/12/2020 28     ANION GAP 11/12/2020 3*    BUN 11/12/2020 12     Creatinine 11/12/2020 0 87     Glucose, Fasting 11/12/2020 82     Calcium 11/12/2020 9 0     eGFR 11/12/2020 108          Radiology Results:      No results found        Thom Vaca MD  [facility] Fellow

## 2022-03-23 NOTE — PATIENT INSTRUCTIONS
Scheduled date of colonoscopy (as of today): 6/16/22  Physician performing colonoscopy: Dr Anyi Arce  Location of colonoscopy: [facility]   Bowel prep reviewed with patient: golytely/dulcolax  Instructions reviewed with patient by: Linda Wolff  Clearances:  n/a

## 2022-03-25 ENCOUNTER — TELEPHONE (OUTPATIENT)
Dept: GASTROENTEROLOGY | Facility: CLINIC | Age: 42
End: 2022-03-25

## 2022-03-25 DIAGNOSIS — K50.10 CROHN'S DISEASE OF LARGE INTESTINE WITHOUT COMPLICATION (HCC): Primary | ICD-10-CM

## 2022-03-25 NOTE — TELEPHONE ENCOUNTER
Notes from 3-23-22 visit:    Crohn's disease without complication, unspecified gastrointestinal tract location [facility])  Diagnosed in 2001 when he presented with abdominal pain, diarrhea   On remicade since many years now   In 2020 his remicade was changed from 5mg Q8 weeks to 10mg Q6 weeks because of frequent flares  Last colonoscopy was in June 2020 which showed endoscopic remission (report not available as it was at an outside facility- patient was following with Dr Earnest Marx until now but had to change due to insurance reason

## 2022-03-25 NOTE — TELEPHONE ENCOUNTER
Rasheed Irizarry,    This patient was seen by Dr Herber Lynch and Dr Elisha Chino  Looks like we are supposed to take over his remicade starting 6/2  Can you please make sure this is set up and approved? Thanks!

## 2022-03-25 NOTE — TELEPHONE ENCOUNTER
I called  [facility] infusion patient was last Infused Nov 26th , was due in January but cancelled due to covid  I called and left  for pt to confirm

## 2022-03-25 NOTE — TELEPHONE ENCOUNTER
Rasheed Irizarry,     This patient was seen by Dr Emir Bojorquez and Dr Cullen Bojorquez like we are supposed to take over his remicade starting 6/2       Can you please make sure this is set up and approved?     Thanks

## 2022-03-28 NOTE — TELEPHONE ENCOUNTER
Srinivas Mcknight returned our call, patient is Remicade 10mg  [id number], patient has been going to [facility] on [facility] in [facility]    Patient is scheduled and due 4-21-22    We made office appt for May with Dr Niru Garza       Patient explained he was coming here to [address] then had insurance change to (current insurance )  and was advised he could no longer go to [facility], this was under [facility] DR NAYLOR explained he might have to stay with [facility] if his insurance already denied coming to [address]   Patient agree's for us to look into pt coming back to [address] but ultimately it is what insurance will cover

## 2022-04-19 ENCOUNTER — TELEPHONE (OUTPATIENT)
Dept: GASTROENTEROLOGY | Facility: CLINIC | Age: 42
End: 2022-04-19

## 2022-04-19 NOTE — TELEPHONE ENCOUNTER
You 3 weeks ago     Arvin Khan returned our call, patient is Remicade 10mg  [id number], patient has been going to [facility] in [place]     Patient is scheduled and due 4-21-22     We made office appt for May with Dr Aurelia Moore  Patient explained he was coming here to [place] [address] then had insurance change to (current insurance )  and was advised he could no longer go to [facility], this was under [facility] DR NAYLOR explained he might have to stay with [facility] if his insurance already denied coming [place] [facility]       Patient agree's for us to look into pt coming back to [place] [address] but ultimately it is what insurance will cover  Documentation      You 3 weeks ago     AB       I called Shania Peña for review, left vm for pt to return our call  Documentation      You 3 weeks ago     AB       Notes from 3-23-22 visit:    Crohn's disease without complication, unspecified gastrointestinal tract location [facility])  · Diagnosed in 2001 when he presented with abdominal pain, diarrhea   · On remicade since many years now   · In 2020 his remicade was changed from 5mg Q8 weeks to 10mg Q6 weeks because of frequent flares  · Last colonoscopy was in June 2020 which showed endoscopic remission (report not available as it was at an outside facility- patient was following with Dr Aunsha Mclean but had to change due to insurance reason           Documentation      You 3 weeks ago     AB       I called [facility] infusion patient was last Infused Nov 26th , was due in January but cancelled due to covid       I called and left vm for pt to confirm

## 2022-05-13 ENCOUNTER — OFFICE VISIT (OUTPATIENT)
Dept: INTERNAL MEDICINE CLINIC | Facility: CLINIC | Age: 42
End: 2022-05-13
Payer: COMMERCIAL

## 2022-05-13 VITALS
HEART RATE: 81 BPM | WEIGHT: 221.4 LBS | DIASTOLIC BLOOD PRESSURE: 72 MMHG | SYSTOLIC BLOOD PRESSURE: 124 MMHG | TEMPERATURE: 98.3 F | HEIGHT: 68 IN | OXYGEN SATURATION: 97 % | BODY MASS INDEX: 33.56 KG/M2

## 2022-05-13 DIAGNOSIS — K50.10 CROHN'S DISEASE OF LARGE INTESTINE WITHOUT COMPLICATION (HCC): ICD-10-CM

## 2022-05-13 DIAGNOSIS — Z13.1 SCREENING FOR DIABETES MELLITUS: ICD-10-CM

## 2022-05-13 DIAGNOSIS — A60.01 HERPES SIMPLEX INFECTION OF PENIS: Primary | ICD-10-CM

## 2022-05-13 DIAGNOSIS — Z13.220 SCREENING FOR HYPERLIPIDEMIA: ICD-10-CM

## 2022-05-13 DIAGNOSIS — M79.622 LEFT UPPER ARM PAIN: ICD-10-CM

## 2022-05-13 DIAGNOSIS — Z13.0 SCREENING FOR DEFICIENCY ANEMIA: ICD-10-CM

## 2022-05-13 PROCEDURE — 99204 OFFICE O/P NEW MOD 45 MIN: CPT | Performed by: INTERNAL MEDICINE

## 2022-05-13 PROCEDURE — 3725F SCREEN DEPRESSION PERFORMED: CPT | Performed by: INTERNAL MEDICINE

## 2022-05-13 RX ORDER — SILDENAFIL 50 MG/1
50 TABLET, FILM COATED ORAL DAILY PRN
COMMUNITY

## 2022-05-13 RX ORDER — VALACYCLOVIR HYDROCHLORIDE 1 G/1
1000 TABLET, FILM COATED ORAL 2 TIMES DAILY
Qty: 20 TABLET | Refills: 0 | Status: SHIPPED | OUTPATIENT
Start: 2022-05-13 | End: 2022-05-23

## 2022-05-13 NOTE — ASSESSMENT & PLAN NOTE
Lesions on the patient's penis are consistent with a herpes simplex infection I have started him on Valtrex 1000 mg twice a day for 10 days I have encouraged him to increase fluid intake while on the medication

## 2022-05-13 NOTE — ASSESSMENT & PLAN NOTE
History of Crohn's disease reviewed with the patient recommend continuation of Remicade as recommended through his GI consultant

## 2022-05-13 NOTE — PROGRESS NOTES
Assessment/Plan:    Herpes simplex infection of penis  Lesions on the patient's penis are consistent with a herpes simplex infection I have started him on Valtrex 1000 mg twice a day for 10 days I have encouraged him to increase fluid intake while on the medication  Crohn's disease without complication ([facility] [address] )  History of Crohn's disease reviewed with the patient recommend continuation of Remicade as recommended through his GI consultant  Left upper arm pain  Left upper arm pain seems to be related to irritation or inflammation in the biceps muscle recommend avoid heavy lifting for period of 2 weeks  Icing irritation down of will be beneficial   He can also use Aleve 1 tablet twice a day for discomfort  Diagnoses and all orders for this visit:    Herpes simplex infection of penis  -     valACYclovir (VALTREX) 1,000 mg tablet; Take 1 tablet (1,000 mg total) by mouth in the morning and 1 tablet (1,000 mg total) in the evening  Do all this for 10 days  Screening for hyperlipidemia  -     Lipid panel; Future    Screening for diabetes mellitus  -     Comprehensive metabolic panel; Future    Screening for deficiency anemia  -     CBC and differential; Future    Crohn's disease of large intestine without complication ([facility] [address] )    Other orders  -     sildenafil (VIAGRA) 50 MG tablet; Take 50 mg by mouth as needed in the morning for erectile dysfunction  Subjective:      Patient ID: Kofi Orozco is a 43 y o  male  This 68-year-old gentleman presents today to establish care with our practice  He is noted to have a history of Crohn's disease and is under treatment by GI services at [facility]  He presents with 2 concerns  The 1st is discomfort in his left biceps muscle region he denies any history of trauma but does have a stepdaughter who weighs proximally 50 lb he frequently lifts her up  Suspect that this may be related to the discomfort in the bicep    A 2nd concern is regarding a lesion that has recently developed on the penis  Patient is employed at the [employer] in [place]  He did have on occur on COVID in January of this year  Of family history is significant for heart disease and benign prostatic hypertrophy  He denies any previous surgery  He has never been a smoker but does have secondhand exposure to tobacco in the [employer]  He has approximately 3 beers and 2 cocktails per week  The following portions of the patient's history were reviewed and updated as appropriate:   He  has a past medical history of Arthritis, Crohn's disease ([facility] [address] ), Eczema, and Rheumatoid arthritis ([facility] )  He   Patient Active Problem List    Diagnosis Date Noted    Herpes simplex infection of penis 05/13/2022    Left upper arm pain 05/13/2022    Crohn's disease without complication ([facility] ) 51/19/0030    Renal lesion 12/07/2020     He  has a past surgical history that includes Colonoscopy (N/A, 1/18/2016)  His family history is not on file  He  reports that he has never smoked  He has never used smokeless tobacco  He reports current alcohol use of about 10 0 standard drinks of alcohol per week  He reports that he does not use drugs  Current Outpatient Medications   Medication Sig Dispense Refill    dupilumab (DUPIXENT) subcutaneous injection       InFLIXimab (REMICADE IV) Infuse 10 mL/kg into a venous catheter   sildenafil (VIAGRA) 50 MG tablet Take 50 mg by mouth as needed in the morning for erectile dysfunction   valACYclovir (VALTREX) 1,000 mg tablet Take 1 tablet (1,000 mg total) by mouth in the morning and 1 tablet (1,000 mg total) in the evening  Do all this for 10 days  20 tablet 0     No current facility-administered medications for this visit       Review of Systems   Musculoskeletal: Positive for myalgias          Left bicep discomfort   Skin:        Lesion on the penis         Objective:      /72   Pulse 81   Temp 98 3 °F (36 8 °C)   Ht 5' 8" (1 727 m)   Wt 100 kg (221 lb 6 4 oz) SpO2 97%   BMI 33 66 kg/m²          Physical Exam  Constitutional:       General: He is not in acute distress  Appearance: He is well-developed  He is not ill-appearing  HENT:      Head: Normocephalic  Right Ear: Hearing, tympanic membrane, ear canal and external ear normal       Left Ear: Hearing, tympanic membrane, ear canal and external ear normal       Nose: Nose normal       Mouth/Throat:      Mouth: Mucous membranes are moist       Pharynx: Oropharynx is clear  Eyes:      General:         Right eye: No discharge  Left eye: No discharge  Extraocular Movements: Extraocular movements intact  Conjunctiva/sclera: Conjunctivae normal       Pupils: Pupils are equal, round, and reactive to light  Neck:      Thyroid: No thyromegaly  Vascular: No carotid bruit  Cardiovascular:      Rate and Rhythm: Normal rate and regular rhythm  Heart sounds: Normal heart sounds, S1 normal and S2 normal  No murmur heard  Pulmonary:      Effort: Pulmonary effort is normal  No respiratory distress  Breath sounds: Normal breath sounds  No wheezing, rhonchi or rales  Abdominal:      General: Bowel sounds are normal  There is no distension  Palpations: Abdomen is soft  There is no mass  Tenderness: There is no abdominal tenderness  There is no guarding  Genitourinary:     Comments: Examination of the penis indicates herpetic type lesions on the penis surface  Musculoskeletal:         General: Normal range of motion  Cervical back: Normal range of motion and neck supple  No rigidity or tenderness  Right lower leg: No edema  Left lower leg: No edema  Comments: Tenderness in the left bicep muscle  No indication of rotator cuff muscle injury normal strength normal range of motion in shoulder joint   Lymphadenopathy:      Cervical: No cervical adenopathy  Skin:     General: Skin is warm and dry  Coloration: Skin is not jaundiced or pale  Neurological:      Mental Status: He is alert and oriented to person, place, and time  Mental status is at baseline  Deep Tendon Reflexes: Reflexes are normal and symmetric  Reflexes normal    Psychiatric:         Mood and Affect: Mood normal          Behavior: Behavior normal          Thought Content:  Thought content normal          Judgment: Judgment normal

## 2022-05-13 NOTE — ASSESSMENT & PLAN NOTE
Left upper arm pain seems to be related to irritation or inflammation in the biceps muscle recommend avoid heavy lifting for period of 2 weeks  Icing irritation down of will be beneficial   He can also use Aleve 1 tablet twice a day for discomfort

## 2022-05-16 NOTE — TELEPHONE ENCOUNTER
I called patient [facility] plan spoke with Cherri King , who explained care cost centers/ coverage   If there would be a change then pt could possible change locations   I called and left detailed message due to [facility] being a low cost center,patient will have to continue with [facility]    Advised pt to return our call to discuss further Principal Discharge DX:	Viral upper respiratory tract infection  Assessment and plan of treatment:	- Follow up with your primary care provider in 24-48hrs  - Remain hydrated  - Take Tylenol 1000mg every 6 hours as needed for pain  - Take Motrin 600mg every 6 hours as needed for pain  - Return to ER if you experience worsening symptoms, shortness of breath

## 2022-05-23 ENCOUNTER — OFFICE VISIT (OUTPATIENT)
Dept: GASTROENTEROLOGY | Facility: MEDICAL CENTER | Age: 42
End: 2022-05-23
Payer: COMMERCIAL

## 2022-05-23 VITALS
DIASTOLIC BLOOD PRESSURE: 72 MMHG | OXYGEN SATURATION: 98 % | WEIGHT: 220.2 LBS | BODY MASS INDEX: 33.37 KG/M2 | SYSTOLIC BLOOD PRESSURE: 128 MMHG | HEART RATE: 82 BPM | HEIGHT: 68 IN

## 2022-05-23 DIAGNOSIS — D84.9 IMMUNOCOMPROMISED PATIENT (HCC): ICD-10-CM

## 2022-05-23 DIAGNOSIS — K50.10 CROHN'S DISEASE OF LARGE INTESTINE WITHOUT COMPLICATION (HCC): Primary | ICD-10-CM

## 2022-05-23 DIAGNOSIS — R19.7 DIARRHEA, UNSPECIFIED TYPE: ICD-10-CM

## 2022-05-23 PROCEDURE — 1036F TOBACCO NON-USER: CPT | Performed by: INTERNAL MEDICINE

## 2022-05-23 PROCEDURE — 3008F BODY MASS INDEX DOCD: CPT | Performed by: INTERNAL MEDICINE

## 2022-05-23 PROCEDURE — 99214 OFFICE O/P EST MOD 30 MIN: CPT | Performed by: INTERNAL MEDICINE

## 2022-05-23 RX ORDER — POLYETHYLENE GLYCOL 3350 17 G/17G
POWDER, FOR SOLUTION ORAL
Qty: 255 G | Refills: 0 | Status: SHIPPED | OUTPATIENT
Start: 2022-05-23

## 2022-05-23 NOTE — H&P (VIEW-ONLY)
[facility] - Outpatient Follow-up Note  Fay Espinosa 43 y o  male MRN: [id number]  Encounter: [id number]          ASSESSMENT AND PLAN:    Fay Espinosa is a 43 y o  male who presents with complaint of crohn's (dx age 24) on Remicade (10 q6wk) for several years here for f/u  Overall he feels well and is 90% better  He has occasional urgency  Colonosocpy 2020 reportedly in endoscopic remission  Cl 111, but BMP otherwise normal  A1c wnl  remote infliximab level 20 w/o ADA  Prior MRI with chronic colitis but no active colitis  1  Crohn's disease of large intestine without complication ([facility] [address] )    2  Immunocompromised patient ([facility] )    3  Diarrhea, unspecified type        Orders Placed This Encounter   Procedures    MRI enterography w wo    Chronic Hepatitis Panel    Quantiferon TB Gold Plus     Continue Remicade every 6 weeks  Next due June 1st  Blood work including remicade level  Colonoscopy 6/16/2022  MRE ordered  No live virus vaccines  Flu shot, COVID vaccine and booster  Pneumonia vaccines, Shingrix  Derm, dental, eye exams    ______________________________________________________________________    SUBJECTIVE:    Fay Espinosa is a 43 y o  male who presents with complaint of Crohn's disease  He feels the same  He is having regular BM but occasional flares  The remicade has been working well  He feels 90-95% better  2 formed BMs per day, without blood or black stools  Occasional urgency but not always  No abdominal pains  No nocturnal BMs  No rashes, mouth sores, joint pains  No heartburn, dysphagia, odynophagia, nausea, vomiting, weight loss  REVIEW OF SYSTEMS IS OTHERWISE NEGATIVE    10 point ROS reviewed and negative, except as above      Historical Information   Past Medical History:   Diagnosis Date    Arthritis     Crohn's disease ([facility] )     Eczema     Rheumatoid arthritis ([facility] )      Past Surgical History:   Procedure Laterality Date    COLONOSCOPY N/A 1/18/2016    Procedure: COLONOSCOPY;  Surgeon: Norma Zelaya MD;  Location: [facility]; Service:      Social History   Social History     Substance and Sexual Activity   Alcohol Use Yes    Alcohol/week: 10 0 standard drinks    Types: 10 Cans of beer per week     Social History     Substance and Sexual Activity   Drug Use No     Social History     Tobacco Use   Smoking Status Never Smoker   Smokeless Tobacco Never Used     History reviewed  No pertinent family history  Meds/Allergies       Current Outpatient Medications:     dupilumab (DUPIXENT) subcutaneous injection    InFLIXimab (REMICADE IV)    magnesium citrate solution    polyethylene glycol (GLYCOLAX) 17 GM/SCOOP powder    sildenafil (VIAGRA) 50 MG tablet    valACYclovir (VALTREX) 1,000 mg tablet    Allergies   Allergen Reactions    Shellfish-Derived Products - Food Allergy GI Intolerance           Objective     Blood pressure 128/72, pulse 82, height 5' 8" (1 727 m), weight 99 9 kg (220 lb 3 2 oz), SpO2 98 %  Body mass index is 33 48 kg/m²  PHYSICAL EXAMINATION:    General Appearance:   Alert, cooperative, no distress   HEENT:  Normocephalic, atraumatic, anicteric  Neck supple, symmetrical, trachea midline  Lungs:   Equal chest rise and unlabored breathing, normal effort, no coughing  Cardiovascular:   No visualized JVD  Abdomen:   No abdominal distension  Skin:   No jaundice, rashes, or lesions  Musculoskeletal:   Normal range of motion visualized  Psych:  Normal affect and normal insight  Neuro:  Alert and appropriate  Lab Results:   No visits with results within 1 Day(s) from this visit     Latest known visit with results is:   Lab on 11/12/2020   Component Date Value    Sodium 11/12/2020 142     Potassium 11/12/2020 4 5     Chloride 11/12/2020 111 (A)    CO2 11/12/2020 28     ANION GAP 11/12/2020 3 (A)    BUN 11/12/2020 12     Creatinine 11/12/2020 0 87     Glucose, Fasting 11/12/2020 82     Calcium 11/12/2020 9 0     eGFR 11/12/2020 108        No results found for: WBC, HGB, HCT, MCV, PLT    Lab Results   Component Value Date    SODIUM 142 11/12/2020    K 4 5 11/12/2020     (H) 11/12/2020    CO2 28 11/12/2020    AGAP 3 (L) 11/12/2020    BUN 12 11/12/2020    CREATININE 0 87 11/12/2020    GLUF 82 11/12/2020    CALCIUM 9 0 11/12/2020    EGFR 108 11/12/2020       No results found for: CRP    No results found for: [id number], TSH    No results found for: IRON, TIBC, FERRITIN    Radiology Results:   No results found

## 2022-05-23 NOTE — PROGRESS NOTES
[facility] - Outpatient Follow-up Note  Monica Reyes 43 y o  male MRN: [id number]  Encounter: [id number]          ASSESSMENT AND PLAN:    Monica Reyes is a 43 y o  male who presents with complaint of crohn's (dx age 24) on Remicade (10 q6wk) for several years here for f/u  Overall he feels well and is 90% better  He has occasional urgency  Colonosocpy 2020 reportedly in endoscopic remission  Cl 111, but BMP otherwise normal  A1c wnl  remote infliximab level 20 w/o ADA  Prior MRI with chronic colitis but no active colitis  1  Crohn's disease of large intestine without complication ([facility] [address] )    2  Immunocompromised patient ([facility] )    3  Diarrhea, unspecified type        Orders Placed This Encounter   Procedures    MRI enterography w wo    Chronic Hepatitis Panel    Quantiferon TB Gold Plus     Continue Remicade every 6 weeks  Next due June 1st  Blood work including remicade level  Colonoscopy 6/16/2022  MRE ordered  No live virus vaccines  Flu shot, COVID vaccine and booster  Pneumonia vaccines, Shingrix  Derm, dental, eye exams    ______________________________________________________________________    SUBJECTIVE:    Monica Reyes is a 43 y o  male who presents with complaint of Crohn's disease  He feels the same  He is having regular BM but occasional flares  The remicade has been working well  He feels 90-95% better  2 formed BMs per day, without blood or black stools  Occasional urgency but not always  No abdominal pains  No nocturnal BMs  No rashes, mouth sores, joint pains  No heartburn, dysphagia, odynophagia, nausea, vomiting, weight loss  REVIEW OF SYSTEMS IS OTHERWISE NEGATIVE    10 point ROS reviewed and negative, except as above      Historical Information   Past Medical History:   Diagnosis Date    Arthritis     [facility]'s [facility] ([facility]     Eczema     Rheumatoid arthritis ([facility] )      Past Surgical History:   Procedure Laterality Date    COLONOSCOPY N/A 1/18/2016    Procedure: COLONOSCOPY;  Surgeon: Thien Mckeon MD;  Location: [facility]; Service:      Social History   Social History     Substance and Sexual Activity   Alcohol Use Yes    Alcohol/week: 10 0 standard drinks    Types: 10 Cans of beer per week     Social History     Substance and Sexual Activity   Drug Use No     Social History     Tobacco Use   Smoking Status Never Smoker   Smokeless Tobacco Never Used     History reviewed  No pertinent family history  Meds/Allergies       Current Outpatient Medications:     dupilumab (DUPIXENT) subcutaneous injection    InFLIXimab (REMICADE IV)    magnesium citrate solution    polyethylene glycol (GLYCOLAX) 17 GM/SCOOP powder    sildenafil (VIAGRA) 50 MG tablet    valACYclovir (VALTREX) 1,000 mg tablet    Allergies   Allergen Reactions    Shellfish-Derived Products - Food Allergy GI Intolerance           Objective     Blood pressure 128/72, pulse 82, height 5' 8" (1 727 m), weight 99 9 kg (220 lb 3 2 oz), SpO2 98 %  Body mass index is 33 48 kg/m²  PHYSICAL EXAMINATION:    General Appearance:   Alert, cooperative, no distress   HEENT:  Normocephalic, atraumatic, anicteric  Neck supple, symmetrical, trachea midline  Lungs:   Equal chest rise and unlabored breathing, normal effort, no coughing  Cardiovascular:   No visualized JVD  Abdomen:   No abdominal distension  Skin:   No jaundice, rashes, or lesions  Musculoskeletal:   Normal range of motion visualized  Psych:  Normal affect and normal insight  Neuro:  Alert and appropriate  Lab Results:   No visits with results within 1 Day(s) from this visit     Latest known visit with results is:   Lab on 11/12/2020   Component Date Value    Sodium 11/12/2020 142     Potassium 11/12/2020 4 5     Chloride 11/12/2020 111 (A)    CO2 11/12/2020 28     ANION GAP 11/12/2020 3 (A)    BUN 11/12/2020 12     Creatinine 11/12/2020 0 87     Glucose, Fasting 11/12/2020 82     Calcium 11/12/2020 9 0     eGFR 11/12/2020 108        No results found for: WBC, HGB, HCT, MCV, PLT    Lab Results   Component Value Date    SODIUM 142 11/12/2020    K 4 5 11/12/2020     (H) 11/12/2020    CO2 28 11/12/2020    AGAP 3 (L) 11/12/2020    BUN 12 11/12/2020    CREATININE 0 87 11/12/2020    GLUF 82 11/12/2020    CALCIUM 9 0 11/12/2020    EGFR 108 11/12/2020       No results found for: CRP    No results found for: [id number], TSH    No results found for: IRON, TIBC, FERRITIN    Radiology Results:   No results found

## 2022-05-31 ENCOUNTER — APPOINTMENT (OUTPATIENT)
Dept: LAB | Facility: HOSPITAL | Age: 42
End: 2022-05-31
Payer: COMMERCIAL

## 2022-05-31 DIAGNOSIS — Z13.1 SCREENING FOR DIABETES MELLITUS: ICD-10-CM

## 2022-05-31 DIAGNOSIS — Z13.220 SCREENING FOR HYPERLIPIDEMIA: ICD-10-CM

## 2022-05-31 DIAGNOSIS — D84.9 IMMUNOCOMPROMISED PATIENT (HCC): ICD-10-CM

## 2022-05-31 DIAGNOSIS — Z13.0 SCREENING FOR DEFICIENCY ANEMIA: ICD-10-CM

## 2022-05-31 DIAGNOSIS — K50.90 CROHN'S DISEASE WITHOUT COMPLICATION, UNSPECIFIED GASTROINTESTINAL TRACT LOCATION (HCC): ICD-10-CM

## 2022-05-31 DIAGNOSIS — K50.10 CROHN'S DISEASE OF LARGE INTESTINE WITHOUT COMPLICATION (HCC): ICD-10-CM

## 2022-05-31 LAB
ALBUMIN SERPL BCP-MCNC: 3.4 G/DL (ref 3.5–5)
ALP SERPL-CCNC: 80 U/L (ref 46–116)
ALT SERPL W P-5'-P-CCNC: 59 U/L (ref 12–78)
ANION GAP SERPL CALCULATED.3IONS-SCNC: 4 MMOL/L (ref 4–13)
AST SERPL W P-5'-P-CCNC: 29 U/L (ref 5–45)
BASOPHILS # BLD AUTO: 0.02 THOUSANDS/ΜL (ref 0–0.1)
BASOPHILS NFR BLD AUTO: 0 % (ref 0–1)
BILIRUB SERPL-MCNC: 1.77 MG/DL (ref 0.2–1)
BUN SERPL-MCNC: 17 MG/DL (ref 5–25)
CALCIUM ALBUM COR SERPL-MCNC: 9.6 MG/DL (ref 8.3–10.1)
CALCIUM SERPL-MCNC: 9.1 MG/DL (ref 8.3–10.1)
CHLORIDE SERPL-SCNC: 106 MMOL/L (ref 100–108)
CHOLEST SERPL-MCNC: 182 MG/DL
CO2 SERPL-SCNC: 27 MMOL/L (ref 21–32)
CREAT SERPL-MCNC: 0.83 MG/DL (ref 0.6–1.3)
CRP SERPL QL: <3 MG/L
EOSINOPHIL # BLD AUTO: 0.05 THOUSAND/ΜL (ref 0–0.61)
EOSINOPHIL NFR BLD AUTO: 1 % (ref 0–6)
ERYTHROCYTE [DISTWIDTH] IN BLOOD BY AUTOMATED COUNT: 13.4 % (ref 11.6–15.1)
GFR SERPL CREATININE-BSD FRML MDRD: 108 ML/MIN/1.73SQ M
GLUCOSE P FAST SERPL-MCNC: 101 MG/DL (ref 65–99)
HCT VFR BLD AUTO: 42.3 % (ref 36.5–49.3)
HDLC SERPL-MCNC: 44 MG/DL
HGB BLD-MCNC: 14.2 G/DL (ref 12–17)
IMM GRANULOCYTES # BLD AUTO: 0.06 THOUSAND/UL (ref 0–0.2)
IMM GRANULOCYTES NFR BLD AUTO: 1 % (ref 0–2)
LDLC SERPL CALC-MCNC: 99 MG/DL (ref 0–100)
LYMPHOCYTES # BLD AUTO: 2.15 THOUSANDS/ΜL (ref 0.6–4.47)
LYMPHOCYTES NFR BLD AUTO: 27 % (ref 14–44)
MCH RBC QN AUTO: 30 PG (ref 26.8–34.3)
MCHC RBC AUTO-ENTMCNC: 33.6 G/DL (ref 31.4–37.4)
MCV RBC AUTO: 89 FL (ref 82–98)
MONOCYTES # BLD AUTO: 0.85 THOUSAND/ΜL (ref 0.17–1.22)
MONOCYTES NFR BLD AUTO: 11 % (ref 4–12)
NEUTROPHILS # BLD AUTO: 4.82 THOUSANDS/ΜL (ref 1.85–7.62)
NEUTS SEG NFR BLD AUTO: 60 % (ref 43–75)
NONHDLC SERPL-MCNC: 138 MG/DL
NRBC BLD AUTO-RTO: 0 /100 WBCS
PLATELET # BLD AUTO: 216 THOUSANDS/UL (ref 149–390)
PMV BLD AUTO: 10.5 FL (ref 8.9–12.7)
POTASSIUM SERPL-SCNC: 4.3 MMOL/L (ref 3.5–5.3)
PROT SERPL-MCNC: 7.3 G/DL (ref 6.4–8.2)
RBC # BLD AUTO: 4.74 MILLION/UL (ref 3.88–5.62)
SODIUM SERPL-SCNC: 137 MMOL/L (ref 136–145)
TRIGL SERPL-MCNC: 197 MG/DL
WBC # BLD AUTO: 7.95 THOUSAND/UL (ref 4.31–10.16)

## 2022-05-31 PROCEDURE — 87340 HEPATITIS B SURFACE AG IA: CPT

## 2022-05-31 PROCEDURE — 85025 COMPLETE CBC W/AUTO DIFF WBC: CPT

## 2022-05-31 PROCEDURE — 86706 HEP B SURFACE ANTIBODY: CPT

## 2022-05-31 PROCEDURE — 86140 C-REACTIVE PROTEIN: CPT

## 2022-05-31 PROCEDURE — 36415 COLL VENOUS BLD VENIPUNCTURE: CPT

## 2022-05-31 PROCEDURE — 86480 TB TEST CELL IMMUN MEASURE: CPT

## 2022-05-31 PROCEDURE — 80053 COMPREHEN METABOLIC PANEL: CPT

## 2022-05-31 PROCEDURE — 82397 CHEMILUMINESCENT ASSAY: CPT

## 2022-05-31 PROCEDURE — 86704 HEP B CORE ANTIBODY TOTAL: CPT

## 2022-05-31 PROCEDURE — 80061 LIPID PANEL: CPT

## 2022-05-31 PROCEDURE — 86705 HEP B CORE ANTIBODY IGM: CPT

## 2022-05-31 PROCEDURE — 80230 DRUG ASSAY INFLIXIMAB: CPT

## 2022-05-31 PROCEDURE — 86803 HEPATITIS C AB TEST: CPT

## 2022-06-01 LAB
GAMMA INTERFERON BACKGROUND BLD IA-ACNC: 0.04 IU/ML
HBV CORE AB SER QL: NORMAL
HBV CORE IGM SER QL: NORMAL
HBV SURFACE AB SER-ACNC: <3.1 MIU/ML
HBV SURFACE AG SER QL: NORMAL
HCV AB SER QL: NORMAL
M TB IFN-G BLD-IMP: NEGATIVE
M TB IFN-G CD4+ BCKGRND COR BLD-ACNC: -0.01 IU/ML
M TB IFN-G CD4+ BCKGRND COR BLD-ACNC: -0.01 IU/ML
MITOGEN IGNF BCKGRD COR BLD-ACNC: >10 IU/ML

## 2022-06-06 LAB
INFLIXIMAB AB SERPL-MCNC: <22 NG/ML
INFLIXIMAB SERPL-MCNC: 18 UG/ML

## 2022-06-16 ENCOUNTER — ANESTHESIA (OUTPATIENT)
Dept: GASTROENTEROLOGY | Facility: HOSPITAL | Age: 42
End: 2022-06-16

## 2022-06-16 ENCOUNTER — ANESTHESIA EVENT (OUTPATIENT)
Dept: GASTROENTEROLOGY | Facility: HOSPITAL | Age: 42
End: 2022-06-16

## 2022-06-16 ENCOUNTER — HOSPITAL ENCOUNTER (OUTPATIENT)
Dept: GASTROENTEROLOGY | Facility: HOSPITAL | Age: 42
Setting detail: OUTPATIENT SURGERY
Discharge: HOME | End: 2022-06-16
Attending: INTERNAL MEDICINE
Payer: COMMERCIAL

## 2022-06-16 VITALS
SYSTOLIC BLOOD PRESSURE: 106 MMHG | HEIGHT: 67 IN | OXYGEN SATURATION: 100 % | BODY MASS INDEX: 33.74 KG/M2 | DIASTOLIC BLOOD PRESSURE: 67 MMHG | WEIGHT: 215 LBS | TEMPERATURE: 96.3 F | HEART RATE: 71 BPM | RESPIRATION RATE: 16 BRPM

## 2022-06-16 DIAGNOSIS — K50.90 CROHN'S DISEASE WITHOUT COMPLICATION, UNSPECIFIED GASTROINTESTINAL TRACT LOCATION (HCC): ICD-10-CM

## 2022-06-16 PROCEDURE — 45380 COLONOSCOPY AND BIOPSY: CPT | Performed by: INTERNAL MEDICINE

## 2022-06-16 PROCEDURE — 88305 TISSUE EXAM BY PATHOLOGIST: CPT | Performed by: PATHOLOGY

## 2022-06-16 RX ORDER — SODIUM CHLORIDE 9 MG/ML
INJECTION, SOLUTION INTRAVENOUS CONTINUOUS PRN
Status: DISCONTINUED | OUTPATIENT
Start: 2022-06-16 | End: 2022-06-16

## 2022-06-16 RX ORDER — LIDOCAINE HYDROCHLORIDE 10 MG/ML
INJECTION, SOLUTION EPIDURAL; INFILTRATION; INTRACAUDAL; PERINEURAL AS NEEDED
Status: DISCONTINUED | OUTPATIENT
Start: 2022-06-16 | End: 2022-06-16

## 2022-06-16 RX ORDER — PROPOFOL 10 MG/ML
INJECTION, EMULSION INTRAVENOUS CONTINUOUS PRN
Status: DISCONTINUED | OUTPATIENT
Start: 2022-06-16 | End: 2022-06-16

## 2022-06-16 RX ORDER — PROPOFOL 10 MG/ML
INJECTION, EMULSION INTRAVENOUS AS NEEDED
Status: DISCONTINUED | OUTPATIENT
Start: 2022-06-16 | End: 2022-06-16

## 2022-06-16 RX ADMIN — PROPOFOL 120 MG: 10 INJECTION, EMULSION INTRAVENOUS at 11:20

## 2022-06-16 RX ADMIN — PROPOFOL 120 MCG/KG/MIN: 10 INJECTION, EMULSION INTRAVENOUS at 11:20

## 2022-06-16 RX ADMIN — LIDOCAINE HYDROCHLORIDE 50 MG: 10 INJECTION, SOLUTION EPIDURAL; INFILTRATION; INTRACAUDAL; PERINEURAL at 11:20

## 2022-06-16 RX ADMIN — PROPOFOL 30 MG: 10 INJECTION, EMULSION INTRAVENOUS at 11:22

## 2022-06-16 RX ADMIN — PROPOFOL 50 MG: 10 INJECTION, EMULSION INTRAVENOUS at 11:25

## 2022-06-16 RX ADMIN — SODIUM CHLORIDE: 9 INJECTION, SOLUTION INTRAVENOUS at 11:15

## 2022-06-16 NOTE — ANESTHESIA POSTPROCEDURE EVALUATION
Post-Op Assessment Note    CV Status:  Stable  Pain Score: 0    Pain management: adequate     Mental Status:  Awake and somnolent   Hydration Status:  Stable   PONV Controlled:  None   Airway Patency:  Patent      Post Op Vitals Reviewed: Yes      Staff: CRNA         No complications documented      /76 (06/16/22 1155)    Temp (!) 96 3 °F (35 7 °C) (06/16/22 1155)    Pulse 80 (06/16/22 1155)   Resp 16 (06/16/22 1155)    SpO2 97 % (06/16/22 1155)

## 2022-06-16 NOTE — INTERVAL H&P NOTE
H&P reviewed  After examining the patient I find no changes in the patients condition since the H&P had been written      Vitals:    06/16/22 1048   BP: 122/64   Pulse: 72   Resp: 16   Temp: (!) 97 °F (36 1 °C)   SpO2: 96%

## 2022-06-16 NOTE — ANESTHESIA PREPROCEDURE EVALUATION
Procedure:  COLONOSCOPY    Relevant Problems   No relevant active problems        Physical Exam    Airway    Mallampati score: III  TM Distance: >3 FB  Neck ROM: full     Dental   No notable dental hx     Cardiovascular  Cardiovascular exam normal    Pulmonary  Pulmonary exam normal     Other Findings        Anesthesia Plan  ASA Score- 2     Anesthesia Type- IV sedation with anesthesia with ASA Monitors  Additional Monitors:   Airway Plan:           Plan Factors-Exercise tolerance (METS): >4 METS  Chart reviewed  EKG reviewed  Existing labs reviewed  Patient summary reviewed  Patient is not a current smoker  Induction- intravenous  Postoperative Plan-     Informed Consent- Anesthetic plan and risks discussed with patient

## 2022-07-08 ENCOUNTER — TELEPHONE (OUTPATIENT)
Dept: GASTROENTEROLOGY | Facility: CLINIC | Age: 42
End: 2022-07-08

## 2022-07-19 ENCOUNTER — TELEPHONE (OUTPATIENT)
Dept: GASTROENTEROLOGY | Facility: MEDICAL CENTER | Age: 42
End: 2022-07-19

## 2022-07-19 NOTE — TELEPHONE ENCOUNTER
----- Message from Anita Flower sent at 7/19/2022  7:33 AM EDT -----  Regarding: FW: Remicade    ----- Message -----  From: Skip North  Sent: 7/18/2022   5:17 PM EDT  To: [facility]  Subject: Remicade                                         For my last Remicade infusion there was a letter of authorization on the [id number] website and one for the upcoming one I have scheduled for tomorrow  I can't find it on the website right now  Can you please assist in helping me navigate [employer]?

## 2022-07-19 NOTE — TELEPHONE ENCOUNTER
Left patient a voicemail indicating that I see that he is approved for Remicade 10mg/kg every 6 weeks until January of 2023 with [facility], this was confirmed with [facility]  If there are anymore questions please phone me back at [phone]  Thank you

## 2022-08-10 ENCOUNTER — HOSPITAL ENCOUNTER (OUTPATIENT)
Dept: MRI IMAGING | Facility: HOSPITAL | Age: 42
Discharge: HOME | End: 2022-08-10
Attending: INTERNAL MEDICINE
Payer: COMMERCIAL

## 2022-08-10 DIAGNOSIS — R19.7 DIARRHEA, UNSPECIFIED TYPE: ICD-10-CM

## 2022-08-10 DIAGNOSIS — K50.10 CROHN'S DISEASE OF LARGE INTESTINE WITHOUT COMPLICATION (HCC): ICD-10-CM

## 2022-08-10 DIAGNOSIS — D84.9 IMMUNOCOMPROMISED PATIENT (HCC): ICD-10-CM

## 2022-08-10 PROCEDURE — A9585 GADOBUTROL INJECTION: HCPCS | Performed by: INTERNAL MEDICINE

## 2022-08-10 PROCEDURE — 72197 MRI PELVIS W/O & W/DYE: CPT

## 2022-08-10 PROCEDURE — G1004 CDSM NDSC: HCPCS

## 2022-08-10 PROCEDURE — 74183 MRI ABD W/O CNTR FLWD CNTR: CPT

## 2022-08-10 RX ADMIN — GLUCAGON HYDROCHLORIDE 1 MG: KIT at 09:50

## 2022-08-10 RX ADMIN — GADOBUTROL 10 ML: 604.72 INJECTION INTRAVENOUS at 10:06

## 2022-09-02 DIAGNOSIS — A60.01 HERPES SIMPLEX INFECTION OF PENIS: ICD-10-CM

## 2022-09-02 RX ORDER — VALACYCLOVIR HYDROCHLORIDE 1 G/1
1000 TABLET, FILM COATED ORAL 2 TIMES DAILY
Qty: 20 TABLET | Refills: 0 | Status: SHIPPED | OUTPATIENT
Start: 2022-09-02 | End: 2022-11-14

## 2022-11-14 ENCOUNTER — OFFICE VISIT (OUTPATIENT)
Dept: GASTROENTEROLOGY | Facility: CLINIC | Age: 42
End: 2022-11-14

## 2022-11-14 VITALS
HEART RATE: 83 BPM | OXYGEN SATURATION: 98 % | SYSTOLIC BLOOD PRESSURE: 114 MMHG | BODY MASS INDEX: 36.41 KG/M2 | TEMPERATURE: 98.3 F | HEIGHT: 67 IN | DIASTOLIC BLOOD PRESSURE: 79 MMHG | WEIGHT: 232 LBS

## 2022-11-14 DIAGNOSIS — R19.7 DIARRHEA, UNSPECIFIED TYPE: ICD-10-CM

## 2022-11-14 DIAGNOSIS — D84.9 IMMUNOCOMPROMISED PATIENT (HCC): ICD-10-CM

## 2022-11-14 DIAGNOSIS — K50.90 CROHN'S DISEASE WITHOUT COMPLICATION, UNSPECIFIED GASTROINTESTINAL TRACT LOCATION (HCC): Primary | ICD-10-CM

## 2022-11-14 NOTE — PATIENT INSTRUCTIONS
Continue Remicade   Repeat blood work ordered  Repeat colonoscopy in 2024  Next MR enterography 2024  Low FODMAP diet  Avoid live virus vaccines  Yearly flu shot  COVID vaccine and booster  Pneumonia vaccine  Shingrix  Routine skin exams with the dermatologist

## 2022-11-14 NOTE — PROGRESS NOTES
[facility] - Outpatient Follow-up Note  Rigo Jordan 43 y o  male MRN: [id number]  Encounter: [id number]          ASSESSMENT AND PLAN:    Rigo Jordan is a 43 y o  male with longstanding Crohn's disease diagnosed at age 24 on Remicade 8 milligrams/kilogram every 6 weeks who presents for follow-up  Overall feels well but intermittent symptoms that could be from chronic changes of Crohn's vs IBS vs SIBO vs other  Colonoscopy from June 2022 with atrophic scar terminal ileum and nonobstructing stricture that appeared chronic in nature, mild erythema in the distal rectum  Biopsies of the terminal ileum benign, cecum with increased lamina propria inflammation, the remainder of the biopsies did not show significant pathologic abnormalities except for the rectum that had minimal active inflammation  MR enterography from August 2022 his changes of chronic inflammatory bowel disease involving 12-15 cm of the terminal ileum with areas of sacculation and probable nonobstructing stricture but no active inflammatory bowel disease  Blood work notable for CMP with total bilirubin of 1 77, albumin 3 4, glucose 101 but otherwise normal   CBC with normal white blood cell count, hemoglobin, platelets  Prior hemoglobin A1c 5 5  CRP less than 3  Hepatitis profile and QuantiFERON gold negative  Infliximab drug level 18 with no antidrug antibodies  1  Crohn's disease without complication, unspecified gastrointestinal tract location ([facility] )    2  Immunocompromised patient ([facility] )    3   Diarrhea, unspecified type        Orders Placed This Encounter   Procedures   • CBC and differential   • Comprehensive metabolic panel   • C-reactive protein     Continue Remicade 10 milligrams/kilogram every 6 weeks  Repeat blood work due now  Next QuantiFERON gold and hepatitis panel May 2023  Repeat colonoscopy in 2024  Next MR enterography 2024  Low residue diet  Avoid live virus vaccines  Yearly flu shot  COVID vaccine and booster  Pneumonia vaccine  Shingrix  Routine skin exams with the dermatologist    ______________________________________________________________________    SUBJECTIVE:    Linh Mercado is a 43 y o  male who presents with complaint of Crohn's  Occasional flare's  Diarrhea on Friday  Maybe 5 flares over the past 2 months  Mostly stools are [place] 4 and 2-3 times per day  With a flare he has 1 day of 5+ stools and [place] 6  No bright red blood per rectum/rectal bleeding, no melena  + urgency, + nocturnal BMs (3 times a week), + incontinence back in August  No abdominal pain  No rashes, no mouth sores, Some joint pains from arthritis  No significant heartburn, dysphagia, odynophagia, nausea, vomiting  No weight loss       Answers for HPI/ROS submitted by the patient on 11/12/2022  When you are not experiencing symptoms of your inflammatory bowel disease, how many bowel movements do you typically have each day?: 2  What is the average (typical) number of bowel movements that you had in a single day during the last week?: 2  Over the last 3 days, have you had any bowel movements where you passed blood without stool?: No  Since your last visit, have you received any vaccinations?: Yes  Since the last visit, have you had an infection?: No  In the past three months, have you used tobacco in any form?: No  During the last year, how many days have you missed work or school because of your inflammatory bowel disease?: 10  During the last year, how many days have you been hospitalized because of your inflammatory bowel disease?: 0  During the last year, how many days have you visited a hospital emergency department because of your inflammatory bowel disease?: 0  During the last month, have you taken narcotic pain medications (such as Percocet, oxycodone, Oxycontin, morphine, Vicodin, Dilaudid, MS Contin) for your inflammatory bowel disease?: No  Have you awoken at night because you needed to move your bowels during the last month? : Yes  Have you had leakage of stool while sleeping during the last month?: No  Have you had leakage of stool while you were awake during the last month?: No  In the last 6 months, have you unintentionally lost weight?: No  Fever: No  Eye irritation: No  Mouth sores: No  Sore throat: No  Chest pain: No  Shortness of breath: No  Numbness or tingling in your hands or feet: No  Skin rash: No  Pain or swelling in your joints: No  Bruising or bleeding: No  Felt depressed or blue: Yes        REVIEW OF SYSTEMS IS OTHERWISE NEGATIVE  10 point ROS reviewed and negative, except as above    Historical Information   Past Medical History:   Diagnosis Date   • Arthritis    • Crohn's disease ([facility] )    • Eczema    • Rheumatoid arthritis ([facility] )      Past Surgical History:   Procedure Laterality Date   • COLONOSCOPY N/A 1/18/2016    Procedure: COLONOSCOPY;  Surgeon: Brianna Saleem MD;  Location: [facility]; Service:      Social History   Social History     Substance and Sexual Activity   Alcohol Use Yes   • Alcohol/week: 10 0 standard drinks   • Types: 10 Cans of beer per week     Social History     Substance and Sexual Activity   Drug Use No     Social History     Tobacco Use   Smoking Status Never Smoker   Smokeless Tobacco Never Used     History reviewed  No pertinent family history  Meds/Allergies       Current Outpatient Medications:   •  dupilumab (DUPIXENT) subcutaneous injection  •  InFLIXimab (REMICADE IV)  •  sildenafil (VIAGRA) 50 MG tablet    Allergies   Allergen Reactions   • Shellfish-Derived Products - Food Allergy GI Intolerance           Objective     Blood pressure 114/79, pulse 83, temperature 98 3 °F (36 8 °C), temperature source Tympanic, height 5' 7" (1 702 m), weight 105 kg (232 lb), SpO2 98 %  Body mass index is 36 34 kg/m²  PHYSICAL EXAMINATION:    General Appearance:   Alert, cooperative, no distress   HEENT:  Normocephalic, atraumatic, anicteric   Neck supple, symmetrical, trachea midline  Lungs:   Equal chest rise and unlabored breathing, normal effort, no coughing  Cardiovascular:   No visualized JVD  Abdomen:   No abdominal distension  Skin:   No jaundice, rashes, or lesions  Musculoskeletal:   Normal range of motion visualized  Psych:  Normal affect and normal insight  Neuro:  Alert and appropriate  Lab Results:   No visits with results within 1 Day(s) from this visit     Latest known visit with results is:   Hospital Outpatient Visit on 06/16/2022   Component Date Value   • Case Report 06/16/2022                      Value:Surgical Pathology Report                         Case: [id number]                                   Authorizing Provider:  Sotero Almodovar MD           Collected:           06/16/2022 1132              Ordering Location:     [address] [place] [address]      Received:            06/16/2022 [facility] Endoscopy                                                           Pathologist:           Ely Shah MD                                                                Specimens:   A) - Terminal Ileum, hx crohns - cold bx                                                            B) - Large Intestine, Cecum, hx crohns - cold bx                                                    C) - Large Intestine, Right/Ascending Colon, hx crohns - cold bx                                    D) - Large Intestine, Hepatic Flexure, hx crohns - cold bx                                          E) - Large Intestine, Transverse Colon, distal - hx crohns - cold bx                                                          F) - Large Intestine, Transverse Colon, proximal - hx crohns - cold bx                              G) - Large Intestine, Splenic Flexure, hx crohns - cold bx                                          H) - Large Intestine, Left/Descending Colon, hx crohns - cold bx                                    I) - Large Intestine, Sigmoid Colon, hx crohns - cold bx                                            J) - Rectum, hx crohns - cold bx                                                          • Final Diagnosis 06/16/2022                      Value: This result contains rich text formatting which cannot be displayed here  • Additional Information 06/16/2022                      Value: This result contains rich text formatting which cannot be displayed here  • Gross Description 06/16/2022                      Value: This result contains rich text formatting which cannot be displayed here  Lab Results   Component Value Date    WBC 7 95 05/31/2022    HGB 14 2 05/31/2022    HCT 42 3 05/31/2022    MCV 89 05/31/2022     05/31/2022       Lab Results   Component Value Date    SODIUM 137 05/31/2022    K 4 3 05/31/2022     05/31/2022    CO2 27 05/31/2022    AGAP 4 05/31/2022    BUN 17 05/31/2022    CREATININE 0 83 05/31/2022    GLUF 101 (H) 05/31/2022    CALCIUM 9 1 05/31/2022    AST 29 05/31/2022    ALT 59 05/31/2022    ALKPHOS 80 05/31/2022    TP 7 3 05/31/2022    TBILI 1 77 (H) 05/31/2022    EGFR 108 05/31/2022       Lab Results   Component Value Date    CRP <3 0 05/31/2022       No results found for: [id number], TSH    No results found for: IRON, TIBC, FERRITIN    Radiology Results:   No results found

## 2022-12-01 ENCOUNTER — SOCIAL WORK (OUTPATIENT)
Dept: BEHAVIORAL/MENTAL HEALTH CLINIC | Facility: CLINIC | Age: 42
End: 2022-12-01

## 2022-12-01 DIAGNOSIS — F43.9 STRESS: Primary | ICD-10-CM

## 2022-12-01 NOTE — PSYCH
Psychotherapy Provided: Individual Psychotherapy 60 minutes     Length of time in session: 60 minutes, follow up in two weeks    Encounter Diagnosis     ICD-10-CM    1  Stress  F43 9           Goals addressed in session: Goal 1 Manage stress    Pain:      none    0    Current suicide risk : Low     DLuba Pacheco has been struggling with multiple stressors over the last year  Working in position at [employer] that has become more difficult under new mgmt  Due to the job market and his qualifications, he has limited option  In addition his father passed away in May and left his mother with major financial and housing issues  Vani Pacheco working to purchase home where he rents so his mother can reside with him rather than being homeless  He got engaged in 2021 and she broke off engagement this year  He continues to maintain contact with her as a friend but would desire a different relationship which she has made clear will not be an option  The combination of these stressors has created anxiety and decreased frustration tolerance in the workplace  Sleep significantly improve via Melatonin  Denies any acute depressive symptoms or anxiety symptoms at present  POWER- Vani Pacheco present as mildly anxious but is pleasant, verbal, cooperative, well oriented and engaged during session  Thoughts are logical and goal directed  Cognition intact  Above average intelligence  Responds well to support and intervention  P- processed stressors and their influence on his stress and anxiety- validation and supportive therapy provided  Began reviewed stress mgmt strategies and productive outlets to utilize on an increasing basis  Needs to make daily for these activities  Will see for return sessions  Behavioral Health Treatment Plan [facility]: Diagnosis and Treatment Plan explained to Fidel Galo relates understanding diagnosis and is agreeable to Treatment Plan   Yes     Visit start and stop times:10:55-11:55    12/01/22

## 2022-12-15 ENCOUNTER — SOCIAL WORK (OUTPATIENT)
Dept: BEHAVIORAL/MENTAL HEALTH CLINIC | Facility: CLINIC | Age: 42
End: 2022-12-15

## 2022-12-15 DIAGNOSIS — F43.9 STRESS: Primary | ICD-10-CM

## 2022-12-15 NOTE — PSYCH
Psychotherapy Provided:  Individual Psychotherapy 45 minutes     Length of time in session: 45 minutes, follow up in 4 weeks  Encounter Diagnosis     ICD-10-CM    1  Stress  F43 9           Goals addressed in session: Goal 1 Manage stress    Pain:      none    0    Current suicide risk : Low     ARIANA Nichols reports a decrease in stress and his frustration and age r at work have diminished to pint his coworkers have commented  This is likely due to progress related to his mother's housing situation and resulting from his efforts  In addition, less prone to wait for any communication or signal from ex-fiance and considering other dating opportunities  Both of these  Have lessened stress and probably have had a positive influence on his mood  Denies any acute issues  Umang Card denies any acute issues and presents accordingly  He is pleasant, verbal, cooperative, well oriented and engaged during session  Significantly more relaxed than initial session  Thoughts are logical and goal directed  Cognition intact  Appropriately dressed and groomed  Making positive statements about present and future  P- processed progress made since last session per his own report, via input from colleagues and as evidenced by his presentation today  Reviewed appropriate stress mgmt strategies and productive outlets to continue to utilize on an increasing basis  Behavioral Health Treatment Plan [facility]: Diagnosis and Treatment Plan explained to Kristy Kessler relates understanding diagnosis and is agreeable to Treatment Plan   Yes     Visit start and stop times: 11:00-11:45    12/15/22

## 2023-01-12 ENCOUNTER — SOCIAL WORK (OUTPATIENT)
Dept: BEHAVIORAL/MENTAL HEALTH CLINIC | Facility: CLINIC | Age: 43
End: 2023-01-12

## 2023-01-12 DIAGNOSIS — F43.9 STRESS: Primary | ICD-10-CM

## 2023-01-12 NOTE — PSYCH
Assessment/Plan:      Diagnoses and all orders for this visit:    Stress          Subjective: Diaz Myrick has had difficulties over the last year managing his stress, anxiety and anger  Deals with a great deal of stress daily with work and this remains his primary stressor  In addition, this past summer, his engagement was called off due to his fiance'  Has been attempting to maintain a plutonic relationship with her but this has been stressful  Patient ID: Linus Shah is a 43 y o  male  HPI: Crohn's disease    Pre-morbid level of function and History of Present Illness: less anxious, overwhelmed; less mood lability  Previous Psychiatric/psychological treatment/year: none  Current Psychiatrist/Therapist: none  Outpatient and/or Partial and Other [employer] Used (CTT, ICM, VNA): none      Problem Assessment:     SOCIAL/VOCATION:  Family Constellation (include parents, relationship with each and pertinent Psych/Medical History):     No family history on file  Mother: Has close contact with his mother- they have a good relationship  She will be living with him in future  Spouse: none  Father: - had good relationship with father  Children: none  Sibling: Older brother- has good relationship with him      Diaz Myrick relates best to mother  he lives alone  Domestic Violence: There is not suspected domestic violence and There is no history of child abuse    Additional Comments related to family/relationships/peer support: Has good family relationships and some meaningful peer relationships        School or Work History (strengths/limitations/needs): Attended high school in [facility] area  Graduated with a bachelor's degree from the [employer]  Works full-time at [employer] in [employer]      Her highest grade level achieved was  H  MARCK Burroughs history includes none    Financial status includes full time employment    LEISURE ASSESSMENT (Include past and present hobbies/interests and level of involvement (Ex: Group/Club Affiliations): Sports fan and spends time with his peer supports  his primary language is [place]  Preferred language is [place]  Ethnic considerations are [place]ac[place]  Religions affiliations and level of involvement Congregational   Does spirituality help you cope? Yes     FUNCTIONAL STATUS: There has been a recent change in Coral ability to do the following: no needs    Level of Assistance Needed/By Whom?: none    Coral learns best by  all learning methods    SUBSTANCE ABUSE ASSESSMENT: no substance abuse    Substance/Route/Age/Amount/Frequency/Last Use: social drinker    DETOX HISTORY: none    Previous detox/rehab treatment: none    HEALTH ASSESSMENT: no referral to PCP needed    LEGAL: none    Prenatal History: uneventful pregnancy    Delivery History: N/A    Developmental Milestones: All developmental milestones met  Temperament as an infant was N/A  Temperament as a toddler was N/A  Temperament at school age was N/A  Temperament as a teenager was N/A  Risk Assessment:   The following ratings are based on my interview(s) with Simone    Risk of Harm to Self:   Demographic risk factors include , never  or  status and male  Historical Risk Factors include none  Recent Specific Risk Factors include recent losses of engagement  Additional Factors for a Child or Adolescent gender: male (more likely to succeed) and age over 13    Risk of Harm to Others:   Demographic Risk Factors include male  Historical Risk Factors include none  Recent Specific Risk Factors include multiple stressors    Access to Weapons:   Coral has access to the following weapons: none   The following steps have been taken to ensure weapons are properly secured: N/A    Based on the above information, the client presents the following risk of harm to self or others:  low    The following interventions are recommended:   no intervention changes    Notes regarding this Risk Assessment: Very invested in his family          Review Of Systems:     Mood Anxiety   Behavior Normal    Thought Content Normal   General Relationship Problems   Personality Normal   Other Psych Symptoms Normal   Constitutional Normal   ENT As Noted in HPI   Cardiovascular As Noted in HPI   Respiratory As Noted in HPI   Gastrointestinal As Noted in HPI   Genitourinary As Noted in HPI   Musculoskeletal As Noted in HPI   Integumentary As Noted in HPI   Neurological As Noted in HPI   Endocrine N/A         Mental status:  Appearance calm and cooperative , adequate hygiene and grooming and good eye contact    Mood anxious   Affect affect appropriate    Speech a normal rate and fluent   Thought Processes normal thought processes   Hallucinations no hallucinations present    Thought Content no delusions   Abnormal Thoughts no suicidal thoughts  and no homicidal thoughts    Orientation  oriented to person and place and time   Remote Memory short term memory intact and long term memory intact   Attention Span concentration intact   Intellect Appears to be of Average Intelligence   Fund of Knowledge displays adequate knowledge of current events, adequate fund of knowledge regarding past history and adequate fund of knowledge regarding vocabulary    Insight Insight intact   Judgement judgment was intact   Muscle Strength Normal gait    Language no difficulties   Pain none   Pain Scale 0     Visit start and stop times: 12:00-1:00    01/12/23

## 2023-01-13 ENCOUNTER — TELEPHONE (OUTPATIENT)
Dept: GASTROENTEROLOGY | Facility: CLINIC | Age: 43
End: 2023-01-13

## 2023-01-13 NOTE — TELEPHONE ENCOUNTER
Patients GI provider:  Dr Huang Post     Number to return call: [phone]    Reason for call: Pt calling returning a call from you regarding FMLA form    Scheduled procedure/appointment date if applicable: Apt/procedure n/a

## 2023-01-16 NOTE — TELEPHONE ENCOUNTER
[phone]  My chart message sent to notify patient

## 2023-02-05 ENCOUNTER — LAB (OUTPATIENT)
Dept: LAB | Age: 43
End: 2023-02-05

## 2023-02-05 DIAGNOSIS — R19.7 DIARRHEA, UNSPECIFIED TYPE: ICD-10-CM

## 2023-02-05 DIAGNOSIS — D84.9 IMMUNOCOMPROMISED PATIENT (HCC): ICD-10-CM

## 2023-02-05 DIAGNOSIS — K50.90 CROHN'S DISEASE WITHOUT COMPLICATION, UNSPECIFIED GASTROINTESTINAL TRACT LOCATION (HCC): ICD-10-CM

## 2023-02-05 LAB
ALBUMIN SERPL BCP-MCNC: 3.5 G/DL (ref 3.5–5)
ALP SERPL-CCNC: 61 U/L (ref 46–116)
ALT SERPL W P-5'-P-CCNC: 45 U/L (ref 12–78)
ANION GAP SERPL CALCULATED.3IONS-SCNC: 1 MMOL/L (ref 4–13)
AST SERPL W P-5'-P-CCNC: 26 U/L (ref 5–45)
BASOPHILS # BLD AUTO: 0.03 THOUSANDS/ÂΜL (ref 0–0.1)
BASOPHILS NFR BLD AUTO: 0 % (ref 0–1)
BILIRUB SERPL-MCNC: 0.82 MG/DL (ref 0.2–1)
BUN SERPL-MCNC: 17 MG/DL (ref 5–25)
CALCIUM SERPL-MCNC: 8.7 MG/DL (ref 8.3–10.1)
CHLORIDE SERPL-SCNC: 109 MMOL/L (ref 96–108)
CO2 SERPL-SCNC: 27 MMOL/L (ref 21–32)
CREAT SERPL-MCNC: 0.76 MG/DL (ref 0.6–1.3)
CRP SERPL QL: <3 MG/L
EOSINOPHIL # BLD AUTO: 0.04 THOUSAND/ÂΜL (ref 0–0.61)
EOSINOPHIL NFR BLD AUTO: 1 % (ref 0–6)
ERYTHROCYTE [DISTWIDTH] IN BLOOD BY AUTOMATED COUNT: 12.7 % (ref 11.6–15.1)
GFR SERPL CREATININE-BSD FRML MDRD: 112 ML/MIN/1.73SQ M
GLUCOSE P FAST SERPL-MCNC: 101 MG/DL (ref 65–99)
HCT VFR BLD AUTO: 42 % (ref 36.5–49.3)
HGB BLD-MCNC: 13.9 G/DL (ref 12–17)
IMM GRANULOCYTES # BLD AUTO: 0.04 THOUSAND/UL (ref 0–0.2)
IMM GRANULOCYTES NFR BLD AUTO: 1 % (ref 0–2)
LYMPHOCYTES # BLD AUTO: 2.21 THOUSANDS/ÂΜL (ref 0.6–4.47)
LYMPHOCYTES NFR BLD AUTO: 31 % (ref 14–44)
MCH RBC QN AUTO: 29.8 PG (ref 26.8–34.3)
MCHC RBC AUTO-ENTMCNC: 33.1 G/DL (ref 31.4–37.4)
MCV RBC AUTO: 90 FL (ref 82–98)
MONOCYTES # BLD AUTO: 0.86 THOUSAND/ÂΜL (ref 0.17–1.22)
MONOCYTES NFR BLD AUTO: 12 % (ref 4–12)
NEUTROPHILS # BLD AUTO: 4.02 THOUSANDS/ÂΜL (ref 1.85–7.62)
NEUTS SEG NFR BLD AUTO: 55 % (ref 43–75)
NRBC BLD AUTO-RTO: 0 /100 WBCS
PLATELET # BLD AUTO: 216 THOUSANDS/UL (ref 149–390)
PMV BLD AUTO: 10.8 FL (ref 8.9–12.7)
POTASSIUM SERPL-SCNC: 4 MMOL/L (ref 3.5–5.3)
PROT SERPL-MCNC: 7.2 G/DL (ref 6.4–8.4)
RBC # BLD AUTO: 4.66 MILLION/UL (ref 3.88–5.62)
SODIUM SERPL-SCNC: 137 MMOL/L (ref 135–147)
WBC # BLD AUTO: 7.2 THOUSAND/UL (ref 4.31–10.16)

## 2023-02-13 ENCOUNTER — TELEPHONE (OUTPATIENT)
Dept: GASTROENTEROLOGY | Facility: MEDICAL CENTER | Age: 43
End: 2023-02-13

## 2023-02-13 NOTE — RESULT ENCOUNTER NOTE
Hi,    HE did not read the message I sent him  Can you let him know the message? recent blood work came back and overall it looks good  Thank you!

## 2023-02-13 NOTE — TELEPHONE ENCOUNTER
Patient requesting a call back to discuss test results         Ordering Provider: Kalin Mendez MD Date Completed: 02/05/2023    Lab [x]  MRI []  X-Ray []  CT []  Colonoscopy []  EGD []  Biopsy []  Other []

## 2023-02-13 NOTE — TELEPHONE ENCOUNTER
----- Message from Juni Baires MD sent at 2/13/2023  8:49 AM EST -----  Hi,    HE did not read the message I sent him  Can you let him know the message? recent blood work came back and overall it looks good  Thank you!

## 2023-02-16 ENCOUNTER — SOCIAL WORK (OUTPATIENT)
Dept: BEHAVIORAL/MENTAL HEALTH CLINIC | Facility: CLINIC | Age: 43
End: 2023-02-16

## 2023-02-16 DIAGNOSIS — F43.9 STRESS: Primary | ICD-10-CM

## 2023-02-16 NOTE — PSYCH
Behavioral Health Psychotherapy Progress Note    Psychotherapy Provided: Individual Psychotherapy     No diagnosis found  Goals addressed in session: Goal 1     DATA: Madie Jones has been struggling to manage stress from very difficult work situation while also dealing with stress of trying to maintain friendship with ex-fiance  With work, he has decided to change his perspective and adopt that of his leadership  Goes against his work ethic but will likely reduce stress  Processed stress related to his ex-fiance and discussed feasibility of maintaining any relationship  To his credit, he has been making more consistent efforts to be active and engaged with friends and this has been beneficial  Denies any acute depressive symptoms  During this session, this clinician used the following therapeutic modalities: Solution-Focused Therapy and Supportive Psychotherapy    Substance Abuse was not addressed during this session  If the client is diagnosed with a co-occurring substance use disorder, please indicate any changes in the frequency or amount of use: none  Stage of change for addressing substance use diagnoses: No substance use/Not applicable    ASSESSMENT:  Benjamin Underwood presents with a Anxious mood  his affect is Normal range and intensity and Tearful, which is congruent, with his mood and the content of the session  The client has made progress on their goals  Benjamin Underwood presents with a minimal risk of suicide, minimal risk of self-harm, and minimal risk of harm to others  For any risk assessment that surpasses a "low" rating, a safety plan must be developed  A safety plan was indicated: no  If yes, describe in detail N/A    PLAN: Between sessions, Benjamin Underwood will utilize stress mgmt and relaxation strategies daily to rece his stress and anxiety  Reviewed appropriate boundaries and expectations to maintain with ex-fabián to reduce exposure to this stress    At the next session, the therapist will use Solution-Focused Therapy to address anxiety issues  Behavioral Health Treatment Plan and Discharge Planning: Jessica Recinos is aware of and agrees to continue to work on their treatment plan  They have identified and are working toward their discharge goals   yes    Visit start and stop times: 12:00-12:55    02/16/23

## 2023-02-16 NOTE — BH TREATMENT PLAN
Outpatient Behavioral Health Psychotherapy Treatment Plan    Sukumar Held  1980     Date of Initial Psychotherapy Assessment: 02/16/23  Date of Current Treatment Plan: 02/16/23  Treatment Plan Target Date: 08/16/23  Treatment Plan Expiration Date: N/A    Diagnosis:   No diagnosis found  Area(s) of Need: lack of extensive social supports    Long Term Goal 1 (in the client's own words): manage stress    Stage of Change: Action    Target Date for completion: 08/16/23     Anticipated therapeutic modalities: stress mgmt, relaxation strategies     People identified to complete this goal: Shira Medrano LCSW      Objective 1: (identify the means of measuring success in meeting the objective): Grisel Gutierrez will utilize stress mgmt and relaxation strategies reviewed previously daily to offset stress and anxiety      Objective 2: (identify the means of measuring success in meeting the objective): Grisel Gutierrez will increase utilization of social outlets and activities weekly to offset stress and anxiety            I am currently under the care of a [facility] psychiatric provider:     My [facility] psychiatric provider is: PCP    I am currently taking psychiatric medications: No    I feel that I will be ready for discharge from mental health care when I reach the following (measurable goal/objective): Have no struggles with stress and anxiety    For children and adults who have a legal guardian:   Has there been any change to custody orders and/or guardianship status? NA  If yes, attach updated documentation  I have created my Crisis Plan and have been offered a copy of this plan    [address]: Diagnosis and Treatment Plan explained to [address] [place] [phone] acknowledges an understanding of their diagnosis  Sukumar Held agrees to this treatment plan      I have been offered a copy of this Treatment Plan  yes

## 2023-03-09 ENCOUNTER — SOCIAL WORK (OUTPATIENT)
Dept: BEHAVIORAL/MENTAL HEALTH CLINIC | Facility: CLINIC | Age: 43
End: 2023-03-09

## 2023-03-09 DIAGNOSIS — F43.9 STRESS: Primary | ICD-10-CM

## 2023-03-09 NOTE — PSYCH
Behavioral Health Psychotherapy Progress Note    Psychotherapy Provided: Individual Psychotherapy     1  Stress            Goals addressed in session: Goal 1     DATA: Maddy Baker has become more depressed secondary to issues related to his ex-fiance and fact she has moved on and is now in a committed relationship but still making contact with Maddy Baker has continued to maintain any contact rather than have none since this has been his first serious relationship  Had started several weeks ago but he was then travelling to visit friend in [place] and hopeful this would help his mood issues  However, since his return he is having frequent crying spells, even at work, decreased energy, motivation and interest  Focus and concentration diminished  Sleep and appetite variable  Denies SI  During this session, this clinician used the following therapeutic modalities: Solution-Focused Therapy and Supportive Psychotherapy    Substance Abuse was not addressed during this session  If the client is diagnosed with a co-occurring substance use disorder, please indicate any changes in the frequency or amount of use: none  Stage of change for addressing substance use diagnoses: No substance use/Not applicable    ASSESSMENT:  Lamin Maddox presents with a Depressed mood  his affect is Normal range and intensity and Tearful, which is congruent, with his mood and the content of the session  The client has not made progress on their goals  Lamin Maddox presents with a minimal risk of suicide, minimal risk of self-harm, and minimal risk of harm to others  For any risk assessment that surpasses a "low" rating, a safety plan must be developed  A safety plan was indicated: yes  If yes, describe in detail Maddy Baker agrees to contact me directly if his depression worsens  Aware to also seek eval at ED if needed  Working to get him appt with his PCP to address struggles with depression      PLAN: Between sessions, Lamin Maddox will utilize his social supports including family and peers for emotional support and contact/activity outside of his home  Reviewed coping strategies and productive outlets to utilize  Will develop treatment plan with his PCP  At the next session, the therapist will use Solution-Focused Therapy and Supportive Psychotherapy to address depression  Behavioral Health Treatment Plan and Discharge Planning: Osito Mejia is aware of and agrees to continue to work on their treatment plan  They have identified and are working toward their discharge goals   yes    Visit start and stop times: 12:00-12:50    03/09/23

## 2023-03-16 ENCOUNTER — OFFICE VISIT (OUTPATIENT)
Dept: INTERNAL MEDICINE CLINIC | Facility: CLINIC | Age: 43
End: 2023-03-16

## 2023-03-16 VITALS
WEIGHT: 211 LBS | SYSTOLIC BLOOD PRESSURE: 122 MMHG | OXYGEN SATURATION: 97 % | DIASTOLIC BLOOD PRESSURE: 80 MMHG | TEMPERATURE: 97.5 F | HEART RATE: 81 BPM | BODY MASS INDEX: 33.12 KG/M2 | HEIGHT: 67 IN

## 2023-03-16 DIAGNOSIS — F33.9 EPISODE OF RECURRENT MAJOR DEPRESSIVE DISORDER, UNSPECIFIED DEPRESSION EPISODE SEVERITY (HCC): Primary | ICD-10-CM

## 2023-03-16 NOTE — PROGRESS NOTES
Name: Johann Lugo      : 1980      MRN: [id number]  Encounter Provider: Sveta Rojas MD  Encounter Date: 3/16/2023   Encounter department: [facility] INTERNAL MEDICINE    Assessment & Plan     1  Episode of recurrent major depressive disorder, unspecified depression episode severity ([facility] [address] )  Assessment & Plan:  After discussion with the patient today I believe his symptoms are consistent with a diagnosis of depression  He probably has a small component of anxiety but predominant symptoms at this point are depression related  After discussion we have recommended sertraline 50 mg daily which the patient is agreeable to try  We will reevaluate him in 3 to 4 weeks  Recommend daily dosing in the morning  Orders:  -     sertraline (ZOLOFT) 50 mg tablet; Take 1 tablet (50 mg total) by mouth daily    BMI Counseling: Body mass index is 33 05 kg/m²  Follow-up plan was not completed due to patient being in urgent or emergent medical situation  Subjective      This 43year-old patient presents today for a urgent visit  He has been under the care of Anibal Ritchie of the psychiatry department for counseling regarding symptoms of depression  He admits to periods of crying along with dizziness difficulty sleeping decreased motivation and decreased ability to focus his attention  He does experience symptoms of stress but these are only at work  Is not seem to have any suicidal ideations  Review of Systems   Psychiatric/Behavioral: Positive for dysphoric mood  Difficulty with concentration motivation and organization of thoughts   All other systems reviewed and are negative  Current Outpatient Medications on File Prior to Visit   Medication Sig   • dupilumab (DUPIXENT) subcutaneous injection    • InFLIXimab (REMICADE IV) Infuse 10 mL/kg into a venous catheter  • sildenafil (VIAGRA) 50 MG tablet Take 50 mg by mouth as needed in the morning for erectile dysfunction  Objective     /80   Pulse 81   Temp 97 5 °F (36 4 °C)   Ht 5' 7" (1 702 m)   Wt 95 7 kg (211 lb)   SpO2 97%   BMI 33 05 kg/m²     Physical Exam  Constitutional:       General: He is not in acute distress  Appearance: He is well-developed  He is not ill-appearing  HENT:      Head: Normocephalic  Right Ear: Hearing, tympanic membrane, ear canal and external ear normal       Left Ear: Hearing, tympanic membrane, ear canal and external ear normal       Nose: Nose normal    Eyes:      Conjunctiva/sclera: Conjunctivae normal       Pupils: Pupils are equal, round, and reactive to light  Neck:      Thyroid: No thyromegaly  Cardiovascular:      Rate and Rhythm: Normal rate and regular rhythm  Heart sounds: Normal heart sounds, S1 normal and S2 normal  No murmur heard  Pulmonary:      Effort: Pulmonary effort is normal       Breath sounds: Normal breath sounds  No wheezing, rhonchi or rales  Abdominal:      General: Bowel sounds are normal       Palpations: Abdomen is soft  Musculoskeletal:         General: Normal range of motion  Lymphadenopathy:      Cervical: No cervical adenopathy  Skin:     General: Skin is warm and dry  Neurological:      Mental Status: He is alert and oriented to person, place, and time  Deep Tendon Reflexes: Reflexes are normal and symmetric  Psychiatric:         Behavior: Behavior normal          Thought Content:  Thought content normal          Judgment: Judgment normal        Darius Curtis MD

## 2023-03-16 NOTE — ASSESSMENT & PLAN NOTE
After discussion with the patient today I believe his symptoms are consistent with a diagnosis of depression  He probably has a small component of anxiety but predominant symptoms at this point are depression related  After discussion we have recommended sertraline 50 mg daily which the patient is agreeable to try  We will reevaluate him in 3 to 4 weeks  Recommend daily dosing in the morning

## 2023-03-30 ENCOUNTER — SOCIAL WORK (OUTPATIENT)
Dept: BEHAVIORAL/MENTAL HEALTH CLINIC | Facility: CLINIC | Age: 43
End: 2023-03-30

## 2023-03-30 DIAGNOSIS — F33.9 EPISODE OF RECURRENT MAJOR DEPRESSIVE DISORDER, UNSPECIFIED DEPRESSION EPISODE SEVERITY (HCC): Primary | ICD-10-CM

## 2023-03-30 NOTE — PSYCH
"Behavioral Health Psychotherapy Progress Note    Psychotherapy Provided: Individual Psychotherapy     1  Episode of recurrent major depressive disorder, unspecified depression episode severity ([address] )            Goals addressed in session: Goal 1     DATA: Deep Pineda has been doing much better  Has been significantly less depressed and has reported an increase in energy, motivation and interest  Continues to deal with stress related to relationship issues but able to manage them while also doing better at work and being more active socially with family and friends  Affect noticeably brighter and he is not tearful at any point during session  Denies any SI  During this session, this clinician used the following therapeutic modalities: Solution-Focused Therapy and Supportive Psychotherapy    Substance Abuse was addressed during this session  If the client is diagnosed with a co-occurring substance use disorder, please indicate any changes in the frequency or amount of use: none  Stage of change for addressing substance use diagnoses: No substance use/Not applicable    ASSESSMENT:  Latoya Muller presents with a Euthymic/ normal mood  his affect is Normal range and intensity, which is congruent, with his mood and the content of the session  The client has made progress on their goals  Latoya Muller presents with a minimal risk of suicide, minimal risk of self-harm, and minimal risk of harm to others  For any risk assessment that surpasses a \"low\" rating, a safety plan must be developed  A safety plan was indicated: no  If yes, describe in detail N/A    PLAN: Between sessions, Latoya Muller will utilize appropriate coping strategies reviewed during sessions as well as productive outlets to manage any depressive symptoms  Reviewed stress mgmt strategies to utilize in response to relationship issues    At the next session, the therapist will use Solution-Focused Therapy and Supportive Psychotherapy to " address depression and anxiety  Behavioral Health Treatment Plan and Discharge Planning: Honorio Wallace is aware of and agrees to continue to work on their treatment plan  They have identified and are working toward their discharge goals   yes    Visit start and stop times: 3:55-4:55    03/30/23 Vital Signs: I have reviewed the initial vital signs.  Constitutional: NAD, well-nourished, appears stated age, no acute distress.  HEENT: Airway patent, moist MM, no erythema/swelling/deformity of oral structures. EOMI, PERRLA.  CV: regular rate, regular rhythm, well-perfused extremities, 2+ b/l DP and radial pulses equal.  Lungs: BCTA, no increased WOB.  ABD: NT, ND, no guarding or rebound, no pulsatile mass, no hernias.   MSK: Neck supple, nontender, nl ROM, no stepoff. Chest nontender. Back nontender. Ext nontender, nl rom, no deformity.   INTEG: Skin warm, dry, no rash.  NEURO: A&Ox3, normal strength, nl sensation throughout, normal speech.   PSYCH: Calm, cooperative, normal affect and interaction.

## 2023-04-07 ENCOUNTER — OFFICE VISIT (OUTPATIENT)
Dept: INTERNAL MEDICINE CLINIC | Facility: CLINIC | Age: 43
End: 2023-04-07

## 2023-04-07 VITALS
DIASTOLIC BLOOD PRESSURE: 76 MMHG | OXYGEN SATURATION: 97 % | HEART RATE: 76 BPM | WEIGHT: 203 LBS | SYSTOLIC BLOOD PRESSURE: 110 MMHG | TEMPERATURE: 97.7 F | HEIGHT: 67 IN | BODY MASS INDEX: 31.86 KG/M2

## 2023-04-07 DIAGNOSIS — F33.9 EPISODE OF RECURRENT MAJOR DEPRESSIVE DISORDER, UNSPECIFIED DEPRESSION EPISODE SEVERITY (HCC): ICD-10-CM

## 2023-04-07 RX ORDER — SERTRALINE HYDROCHLORIDE 100 MG/1
100 TABLET, FILM COATED ORAL DAILY
Qty: 30 TABLET | Refills: 1 | Status: SHIPPED | OUTPATIENT
Start: 2023-04-07 | End: 2023-10-04

## 2023-04-07 NOTE — PROGRESS NOTES
"Name: Nahum Downing      : 1980      MRN: [id number]  Encounter Provider: Albania Nassar MD  Encounter Date: 2023   Encounter department: [address] [place] [address] INTERNAL MEDICINE    Assessment & Plan     1  Episode of recurrent major depressive disorder, unspecified depression episode severity ([facility] [address] )  Assessment & Plan:  I reviewed the patient's response to the initiation of sertraline at 50 mg daily he indicates that it has been somewhat successful at reducing his symptoms but not completely controlling them  We discussed an increase to 100 mg which the patient is comfortable with will initiate the new dose starting tomorrow and I will see him in 3 weeks for follow-up assessment  He continues to receive counseling from Dagoberto Bradford    Orders:  -     sertraline (ZOLOFT) 100 mg tablet; Take 1 tablet (100 mg total) by mouth daily         Subjective      This 72-year-old gentleman returns today for follow-up assessment of the initiation of Zoloft medication prescribed on his last visit for management of depression symptoms  Patient indicates that he does feel some improvement in his symptoms but they are still present  Encouraged by the fact that there is some improvement with the medication  We discussed options for other management including an increase in the dose of the sertraline to 100 mg daily    Review of Systems   Psychiatric/Behavioral: Positive for dysphoric mood  All other systems reviewed and are negative  Current Outpatient Medications on File Prior to Visit   Medication Sig   • dupilumab (DUPIXENT) subcutaneous injection    • InFLIXimab (REMICADE IV) Infuse 10 mL/kg into a venous catheter  • sildenafil (VIAGRA) 50 MG tablet Take 50 mg by mouth as needed in the morning for erectile dysfunction     • [DISCONTINUED] sertraline (ZOLOFT) 50 mg tablet Take 1 tablet (50 mg total) by mouth daily       Objective     /76   Pulse 76   Temp 97 7 °F (36 5 °C)   Ht 5' 7\" " (1 702 m)   Wt 92 1 kg (203 lb)   SpO2 97%   BMI 31 79 kg/m²     Physical Exam  Constitutional:       Appearance: He is well-developed  HENT:      Right Ear: Hearing and external ear normal       Left Ear: Hearing and external ear normal       Nose: Nose normal    Eyes:      Conjunctiva/sclera: Conjunctivae normal       Pupils: Pupils are equal, round, and reactive to light  Neck:      Thyroid: No thyromegaly  Cardiovascular:      Rate and Rhythm: Normal rate and regular rhythm  Heart sounds: Normal heart sounds, S1 normal and S2 normal  No murmur heard  Pulmonary:      Effort: Pulmonary effort is normal       Breath sounds: Normal breath sounds  Abdominal:      General: Bowel sounds are normal       Palpations: Abdomen is soft  Musculoskeletal:         General: Normal range of motion  Lymphadenopathy:      Cervical: No cervical adenopathy  Skin:     General: Skin is warm and dry  Neurological:      Mental Status: He is alert and oriented to person, place, and time  Deep Tendon Reflexes: Reflexes are normal and symmetric  Psychiatric:         Behavior: Behavior normal          Thought Content:  Thought content normal          Judgment: Judgment normal        Justin Malhotra MD

## 2023-04-07 NOTE — ASSESSMENT & PLAN NOTE
I reviewed the patient's response to the initiation of sertraline at 50 mg daily he indicates that it has been somewhat successful at reducing his symptoms but not completely controlling them  We discussed an increase to 100 mg which the patient is comfortable with will initiate the new dose starting tomorrow and I will see him in 3 weeks for follow-up assessment    He continues to receive counseling from [employer]

## 2023-04-28 ENCOUNTER — OFFICE VISIT (OUTPATIENT)
Dept: INTERNAL MEDICINE CLINIC | Facility: CLINIC | Age: 43
End: 2023-04-28

## 2023-04-28 VITALS
SYSTOLIC BLOOD PRESSURE: 110 MMHG | BODY MASS INDEX: 32.18 KG/M2 | DIASTOLIC BLOOD PRESSURE: 76 MMHG | HEIGHT: 67 IN | WEIGHT: 205 LBS | RESPIRATION RATE: 16 BRPM | OXYGEN SATURATION: 97 % | HEART RATE: 72 BPM | TEMPERATURE: 97 F

## 2023-04-28 DIAGNOSIS — F33.9 EPISODE OF RECURRENT MAJOR DEPRESSIVE DISORDER, UNSPECIFIED DEPRESSION EPISODE SEVERITY (HCC): ICD-10-CM

## 2023-04-28 RX ORDER — SERTRALINE HYDROCHLORIDE 100 MG/1
100 TABLET, FILM COATED ORAL DAILY
Qty: 30 TABLET | Refills: 2 | Status: SHIPPED | OUTPATIENT
Start: 2023-04-28 | End: 2023-10-25

## 2023-04-28 NOTE — ASSESSMENT & PLAN NOTE
Depression symptoms have improved on 100 mg of sertraline daily recommend continuation of this current dose he has no apparent side effects  We will reevaluate in 2 months

## 2023-04-28 NOTE — PROGRESS NOTES
"Name: Altagracia Sandhu      : 1980      MRN: [id number]  Encounter Provider: Kiko Vernon MD  Encounter Date: 2023   Encounter department: [address] [place] [address] INTERNAL MEDICINE    Assessment & Plan     1  Episode of recurrent major depressive disorder, unspecified depression episode severity ([facility] [address] )  Assessment & Plan:  Depression symptoms have improved on 100 mg of sertraline daily recommend continuation of this current dose he has no apparent side effects  We will reevaluate in 2 months  Orders:  -     sertraline (ZOLOFT) 100 mg tablet; Take 1 tablet (100 mg total) by mouth daily         Subjective      This very pleasant 80-year-old gentleman returns to our office today for a follow-up assessment  He has now been on sertraline at 100 mg for several weeks  He reports feeling much better on this dose  His previous depression symptoms have improved significantly  He indicates that he is much more upbeat is sleeping well with no distant sleep disturbances  He indicates that he has more energy and better concentration  He denies any side effects of the medication at this time  Review of Systems   All other systems reviewed and are negative  Current Outpatient Medications on File Prior to Visit   Medication Sig   • dupilumab (DUPIXENT) subcutaneous injection    • InFLIXimab (REMICADE IV) Infuse 10 mL/kg into a venous catheter  • sildenafil (VIAGRA) 50 MG tablet Take 50 mg by mouth as needed in the morning for erectile dysfunction  • [DISCONTINUED] sertraline (ZOLOFT) 100 mg tablet Take 1 tablet (100 mg total) by mouth daily       Objective     /76   Pulse 72   Temp (!) 97 °F (36 1 °C)   Resp 16   Ht 5' 7\" (1 702 m)   Wt 93 kg (205 lb)   SpO2 97%   BMI 32 11 kg/m²     Physical Exam  Constitutional:       Appearance: He is well-developed     HENT:      Right Ear: Hearing and external ear normal       Left Ear: Hearing and external ear normal       Nose: Nose normal  " Eyes:      Conjunctiva/sclera: Conjunctivae normal       Pupils: Pupils are equal, round, and reactive to light  Neck:      Thyroid: No thyromegaly  Cardiovascular:      Rate and Rhythm: Normal rate and regular rhythm  Heart sounds: Normal heart sounds, S1 normal and S2 normal  No murmur heard  Pulmonary:      Effort: Pulmonary effort is normal       Breath sounds: Normal breath sounds  No wheezing, rhonchi or rales  Abdominal:      General: Bowel sounds are normal       Palpations: Abdomen is soft  Musculoskeletal:         General: Normal range of motion  Lymphadenopathy:      Cervical: No cervical adenopathy  Skin:     General: Skin is warm and dry  Neurological:      Mental Status: He is alert and oriented to person, place, and time  Deep Tendon Reflexes: Reflexes are normal and symmetric  Psychiatric:         Behavior: Behavior normal          Thought Content:  Thought content normal          Judgment: Judgment normal        Alexandra Hudson MD

## 2023-05-18 ENCOUNTER — SOCIAL WORK (OUTPATIENT)
Dept: BEHAVIORAL/MENTAL HEALTH CLINIC | Facility: CLINIC | Age: 43
End: 2023-05-18

## 2023-05-18 DIAGNOSIS — F33.9 EPISODE OF RECURRENT MAJOR DEPRESSIVE DISORDER, UNSPECIFIED DEPRESSION EPISODE SEVERITY (HCC): Primary | ICD-10-CM

## 2023-05-18 NOTE — PSYCH
"Behavioral Health Psychotherapy Progress Note     Psychotherapy Provided: Individual Psychotherapy     1  Episode of recurrent major depressive disorder, unspecified depression episode severity ([address] )            Goals addressed in session: Goal 1     DATA: Chastity Patricio reports a significant reduction in his depression and presents accordingly  Affect is significantly brighter and is more positive in his perspective  He states that colleagues at work have noticed the change as well and have remarked that he is less irritable and miserable than he has been  Has made progress moving towards finalization of the sale of his mother's home  Has also made progress clearing his own home  Has found enjoyment and fulfillment in these activities  Has been more active socially and will be going on a trip in one week with a friend  Still fixated on his ex-girlfriend but to lesser degree  Continue to reinforce that out continued focus will be away from this topic  Denies any SI or HI  Has altered his perspective of work and this has significantly reduced stress  During this session, this clinician used the following therapeutic modalities: Solution-Focused Therapy and Supportive Psychotherapy    Substance Abuse was addressed during this session  If the client is diagnosed with a co-occurring substance use disorder, please indicate any changes in the frequency or amount of use: none  Stage of change for addressing substance use diagnoses: No substance use/Not applicable    ASSESSMENT:  Pablo Sosa presents with a Euthymic/ normal mood  his affect is Normal range and intensity, which is congruent, with his mood and the content of the session  The client has made progress on their goals  Pablo Sosa presents with a minimal risk of suicide, minimal risk of self-harm, and minimal risk of harm to others  For any risk assessment that surpasses a \"low\" rating, a safety plan must be developed      A safety plan was indicated: " no  If yes, describe in detail n/a    PLAN: Between sessions, Faraflakitoyulisa Sosa will increase utilization of social outlets/interests and productive outlets to maintain current mood stability  At the next session, the therapist will use Solution-Focused Therapy and Supportive Psychotherapy to address depression  Behavioral Health Treatment Plan and Discharge Planning: Poornimayulisa Ssoa is aware of and agrees to continue to work on their treatment plan  They have identified and are working toward their discharge goals   yes    Visit start and stop times: 12:00-1:00    05/18/23

## 2023-06-06 ENCOUNTER — OFFICE VISIT (OUTPATIENT)
Dept: GASTROENTEROLOGY | Facility: CLINIC | Age: 43
End: 2023-06-06
Payer: COMMERCIAL

## 2023-06-06 VITALS
OXYGEN SATURATION: 99 % | DIASTOLIC BLOOD PRESSURE: 77 MMHG | BODY MASS INDEX: 31.86 KG/M2 | HEART RATE: 71 BPM | TEMPERATURE: 97.6 F | HEIGHT: 67 IN | SYSTOLIC BLOOD PRESSURE: 114 MMHG | WEIGHT: 203 LBS

## 2023-06-06 DIAGNOSIS — D84.9 IMMUNOCOMPROMISED PATIENT (HCC): ICD-10-CM

## 2023-06-06 DIAGNOSIS — R19.7 DIARRHEA, UNSPECIFIED TYPE: ICD-10-CM

## 2023-06-06 DIAGNOSIS — K50.90 CROHN'S DISEASE WITHOUT COMPLICATION, UNSPECIFIED GASTROINTESTINAL TRACT LOCATION (HCC): Primary | ICD-10-CM

## 2023-06-06 PROCEDURE — 99214 OFFICE O/P EST MOD 30 MIN: CPT | Performed by: INTERNAL MEDICINE

## 2023-06-06 NOTE — PROGRESS NOTES
[facility] - Outpatient Follow-up Note  Jeremiah Dunn 37 y o  male MRN: [id number]  Encounter: [id number]          ASSESSMENT AND PLAN:    Jeremiah Dunn is a 37 y o  male with longstanding Crohn's disease diagnosed at age 24 on Remicade who presents for follow-up  Previously it was thought that his intermittent symptoms could be from chronic changes of Crohn's versus irritable bowel syndrome versus SIBO versus other  Currently he is doing very well and is in clinical remission  Colonoscopy from June 2022 with atrophic scar in the terminal ileum and nonobstructing stricture that appeared chronic in nature, mild erythema in the distal rectum; biopsies of the terminal ileum benign and cecum with increased lamina propria inflammation but the remainder of the biopsies did not show significant pathologic abnormalities except for the rectum that had minimal active inflammation  MR enterography from August 2022 with changes of chronic inflammatory bowel disease involving 12 to 15 cm of the terminal ileum with areas of sacculation and probable nonobstructing stricture but no active inflammatory bowel disease  Most recent CMP with chloride 109 and glucose 101 but otherwise normal   CBC normal   CRP less than 3     1  Crohn's disease without complication, unspecified gastrointestinal tract location ([facility] [address] )    2  Immunocompromised patient ([facility] )    3   Diarrhea, unspecified type        Orders Placed This Encounter   Procedures   • CBC and differential   • Comprehensive metabolic panel   • C-reactive protein   • Quantiferon TB Gold Plus   • Chronic Hepatitis Panel     Continue Remicade 10 mg/kg every 6 weeks  Next blood work due now  Next quant gold and hepatitis panel May 2023  Repeat colonoscopy in 2024  MR enterography in 2024  Low FODMAP  Drink at least 8 cups of water per day    Avoid live virus vaccines  Yearly flu shot  COVID vaccine and booster  Pneumonia vaccine  Shingrix  Routine skin exams with the dermatologist    ______________________________________________________________________    SUBJECTIVE:    Bruna Mercado is a 37 y o  male who presents with complaint of Crohn's  He has beenfeeling better on the low FODMAP diet  His BMs are regular  He feels much better  No abdominal pain  No blood in the stools  No heartburn, dysphagia, odynophagia, nausea, vomiting  He has lost 30 lbs in the past several months  He has been trying to lose weight       Answers for HPI/ROS submitted by the patient on 5/31/2023  When you are not experiencing symptoms of your inflammatory bowel disease, how many bowel movements do you typically have each day?: 2  Over the last 3 days, what is the maximum number of bowel movements that you had in a single day?: 5  Over the last 3 days, have you had any bowel movements where you passed blood without stool?: No  Since your last visit, have you received any vaccinations?: No  Since the last visit, have you had an infection?: No  In the past three months, have you used tobacco in any form?: No  During the last year, how many days have you missed work or school because of your inflammatory bowel disease?: 5  During the last year, how many days have you been hospitalized because of your inflammatory bowel disease?: 0  During the last year, how many days have you visited a hospital emergency department because of your inflammatory bowel disease?: 0  During the last month, have you taken narcotic pain medications (such as Percocet, oxycodone, Oxycontin, morphine, Vicodin, Dilaudid, MS Contin) for your inflammatory bowel disease?: No  Have you awoken at night because you needed to move your bowels during the last month? : No  Have you had leakage of stool while sleeping during the last month?: No  Have you had leakage of stool while you were awake during the last month?: No  In the last 6 months, have you unintentionally lost weight?: No  Fever: No  Eye irritation: "No  Mouth sores: No  Sore throat: No  Chest pain: No  Shortness of breath: No  Numbness or tingling in your hands or feet: No  Skin rash: No  Pain or swelling in your joints: No  Bruising or bleeding: No  Felt depressed or blue: No      REVIEW OF SYSTEMS IS OTHERWISE NEGATIVE  10 point ROS reviewed and negative, except as above      Historical Information   Past Medical History:   Diagnosis Date   • Arthritis    • Crohn's disease ([facility] )    • Eczema    • Rheumatoid arthritis ([facility] )      Past Surgical History:   Procedure Laterality Date   • COLONOSCOPY N/A 1/18/2016    Procedure: COLONOSCOPY;  Surgeon: Anival Reese MD;  Location:  GI LAB; Service:      Social History   Social History     Substance and Sexual Activity   Alcohol Use Not Currently   • Alcohol/week: 6 0 standard drinks of alcohol   • Types: 3 Cans of beer, 3 Standard drinks or equivalent per week     Social History     Substance and Sexual Activity   Drug Use No     Social History     Tobacco Use   Smoking Status Never   Smokeless Tobacco Never     History reviewed  No pertinent family history  Meds/Allergies       Current Outpatient Medications:   •  dupilumab (DUPIXENT) subcutaneous injection  •  InFLIXimab (REMICADE IV)  •  sertraline (ZOLOFT) 100 mg tablet  •  sildenafil (VIAGRA) 50 MG tablet    Allergies   Allergen Reactions   • Shellfish-Derived Products - Food Allergy GI Intolerance           Objective     Blood pressure 114/77, pulse 71, temperature 97 6 °F (36 4 °C), temperature source Tympanic, height 5' 7\" (1 702 m), weight 92 1 kg (203 lb), SpO2 99 %  Body mass index is 31 79 kg/m²  PHYSICAL EXAMINATION:    General Appearance:   Alert, cooperative, no distress   HEENT:  Normocephalic, atraumatic, anicteric  Neck supple, symmetrical, trachea midline  Lungs:   Equal chest rise and unlabored breathing, normal effort, no coughing  Cardiovascular:   No visualized JVD  Abdomen:   No abdominal distension     Skin:   No jaundice, " rashes, or lesions  Musculoskeletal:   Normal range of motion visualized  Psych:  Normal affect and normal insight  Neuro:  Alert and appropriate  Lab Results:   No visits with results within 1 Day(s) from this visit     Latest known visit with results is:   Lab on 02/05/2023   Component Date Value   • WBC 02/05/2023 7 20    • RBC 02/05/2023 4 66    • Hemoglobin 02/05/2023 13 9    • Hematocrit 02/05/2023 42 0    • MCV 02/05/2023 90    • MCH 02/05/2023 29 8    • MCHC 02/05/2023 33 1    • RDW 02/05/2023 12 7    • MPV 02/05/2023 10 8    • Platelets 07/29/9297 216    • nRBC 02/05/2023 0    • Neutrophils Relative 02/05/2023 55    • Immat GRANS % 02/05/2023 1    • Lymphocytes Relative 02/05/2023 31    • Monocytes Relative 02/05/2023 12    • Eosinophils Relative 02/05/2023 1    • Basophils Relative 02/05/2023 0    • Neutrophils Absolute 02/05/2023 4 02    • Immature Grans Absolute 02/05/2023 0 04    • Lymphocytes Absolute 02/05/2023 2 21    • Monocytes Absolute 02/05/2023 0 86    • Eosinophils Absolute 02/05/2023 0 04    • Basophils Absolute 02/05/2023 0 03    • Sodium 02/05/2023 137    • Potassium 02/05/2023 4 0    • Chloride 02/05/2023 109 (H)    • CO2 02/05/2023 27    • ANION GAP 02/05/2023 1 (L)    • BUN 02/05/2023 17    • Creatinine 02/05/2023 0 76    • Glucose, Fasting 02/05/2023 101 (H)    • Calcium 02/05/2023 8 7    • AST 02/05/2023 26    • ALT 02/05/2023 45    • Alkaline Phosphatase 02/05/2023 61    • Total Protein 02/05/2023 7 2    • Albumin 02/05/2023 3 5    • Total Bilirubin 02/05/2023 0 82    • eGFR 02/05/2023 112    • CRP 02/05/2023 <3 0        Lab Results   Component Value Date    HCT 42 0 02/05/2023    HGB 13 9 02/05/2023    MCV 90 02/05/2023     02/05/2023    WBC 7 20 02/05/2023       Lab Results   Component Value Date    AGAP 1 (L) 02/05/2023    ALKPHOS 61 02/05/2023    ALT 45 02/05/2023    AST 26 02/05/2023    BUN 17 02/05/2023    CALCIUM 8 7 02/05/2023     (H) 02/05/2023 "CO2 27 02/05/2023    CREATININE 0 76 02/05/2023    EGFR 112 02/05/2023    GLUF 101 (H) 02/05/2023    K 4 0 02/05/2023    SODIUM 137 02/05/2023    TBILI 0 82 02/05/2023    TP 7 2 02/05/2023       Lab Results   Component Value Date    CRP <3 0 02/05/2023       No results found for: \"TSH\", \"[id number]\"    No results found for: \"FERRITIN\", \"IRON\", \"TIBC\"    Radiology Results:   No results found    "

## 2023-06-30 ENCOUNTER — OFFICE VISIT (OUTPATIENT)
Dept: INTERNAL MEDICINE CLINIC | Facility: CLINIC | Age: 43
End: 2023-06-30
Payer: COMMERCIAL

## 2023-06-30 VITALS
TEMPERATURE: 97.7 F | DIASTOLIC BLOOD PRESSURE: 64 MMHG | WEIGHT: 200 LBS | BODY MASS INDEX: 31.39 KG/M2 | SYSTOLIC BLOOD PRESSURE: 100 MMHG | OXYGEN SATURATION: 97 % | HEIGHT: 67 IN | HEART RATE: 77 BPM

## 2023-06-30 DIAGNOSIS — F33.9 EPISODE OF RECURRENT MAJOR DEPRESSIVE DISORDER, UNSPECIFIED DEPRESSION EPISODE SEVERITY (HCC): ICD-10-CM

## 2023-06-30 PROBLEM — M79.622 LEFT UPPER ARM PAIN: Status: RESOLVED | Noted: 2022-05-13 | Resolved: 2023-06-30

## 2023-06-30 RX ORDER — SERTRALINE HYDROCHLORIDE 100 MG/1
100 TABLET, FILM COATED ORAL DAILY
Qty: 30 TABLET | Refills: 4 | Status: SHIPPED | OUTPATIENT
Start: 2023-06-30 | End: 2023-12-27

## 2023-06-30 NOTE — PROGRESS NOTES
Name: Grant Dickinson      : 1980      MRN: [id number]  Encounter Provider: Rolando Fiore MD  Encounter Date: 2023   Encounter department: [facility]    Assessment & Plan     1  Episode of recurrent major depressive disorder, unspecified depression episode severity ([facility] )  Assessment & Plan:  Review of the patient's depression symptoms today indicates continued good control of his symptoms  Recommend the continuation of sertraline at 100 mg daily in the morning  New prescriptions were sent to his pharmacy  Follow-up evaluation is recommended in 3 months  Orders:  -     sertraline (ZOLOFT) 100 mg tablet; Take 1 tablet (100 mg total) by mouth daily         Subjective     This 61-year-old gentleman returns to our office today for a follow-up assessment of his depression condition  He remarks that he continues to do well on his sertraline medication  He has no apparent side effects of the drug  He indicates that he has been sleeping well with no significant weight gain on the medication  Concentration and focus are good  He indicates no suicidal ideations  He denies any anxiety symptoms  Review of Systems   All other systems reviewed and are negative  Past Medical History:   Diagnosis Date   • Arthritis    • Crohn's disease ([facility] )    • Eczema    • Rheumatoid arthritis [facility])      Past Surgical History:   Procedure Laterality Date   • COLONOSCOPY N/A 2016    Procedure: COLONOSCOPY;  Surgeon: Claire Graham MD;  Location: [facility]; Service:      No family history on file    Social History     Socioeconomic History   • Marital status: Single     Spouse name: None   • Number of children: None   • Years of education: None   • Highest education level: None   Occupational History   • None   Tobacco Use   • Smoking status: Never   • Smokeless tobacco: Never   Vaping Use   • Vaping Use: Never used   Substance and Sexual Activity   • Alcohol use: Not Currently "    Alcohol/week: 6 0 standard drinks of alcohol     Types: 3 Cans of beer, 3 Standard drinks or equivalent per week   • Drug use: No   • Sexual activity: Not Currently     Partners: Female     Birth control/protection: Condom Male, Female Sterilization   Other Topics Concern   • None   Social History Narrative   • None     Social Determinants of Health     Financial Resource Strain: Not on file   Food Insecurity: Not on file   Transportation Needs: Not on file   Physical Activity: Not on file   Stress: Not on file   Social Connections: Not on file   Intimate Partner Violence: Not on file   Housing Stability: Not on file     Current Outpatient Medications on File Prior to Visit   Medication Sig   • dupilumab (DUPIXENT) subcutaneous injection    • InFLIXimab (REMICADE IV) Infuse 10 mL/kg into a venous catheter  • [DISCONTINUED] sertraline (ZOLOFT) 100 mg tablet Take 1 tablet (100 mg total) by mouth daily   • [DISCONTINUED] sertraline (ZOLOFT) 50 mg tablet Take 50 mg by mouth daily   • sildenafil (VIAGRA) 50 MG tablet Take 50 mg by mouth as needed in the morning for erectile dysfunction  (Patient not taking: Reported on 6/6/2023)     Allergies   Allergen Reactions   • Shellfish-Derived Products - Food Allergy GI Intolerance     Immunization History   Administered Date(s) Administered   • COVID-19 MODERNA VACC 0 5 ML IM 03/30/2021, 04/30/2021   • INFLUENZA 11/18/2021   • Influenza, injectable, quadrivalent, preservative free 0 5 mL 11/02/2018, 09/09/2020   • Influenza, seasonal, injectable 12/27/2011   • Tuberculin Skin Test-PPD Intradermal 12/17/2012       Objective     /64   Pulse 77   Temp 97 7 °F (36 5 °C)   Ht 5' 7\" (1 702 m)   Wt 90 7 kg (200 lb)   SpO2 97%   BMI 31 32 kg/m²     Physical Exam  Constitutional:       General: He is not in acute distress  Appearance: He is well-developed  He is not ill-appearing  HENT:      Head: Normocephalic        Right Ear: Hearing, ear canal and external ear " normal       Left Ear: Hearing and external ear normal       Nose: Nose normal    Eyes:      Conjunctiva/sclera: Conjunctivae normal       Pupils: Pupils are equal, round, and reactive to light  Neck:      Thyroid: No thyromegaly  Cardiovascular:      Rate and Rhythm: Normal rate and regular rhythm  Heart sounds: Normal heart sounds, S1 normal and S2 normal  No murmur heard  Pulmonary:      Effort: Pulmonary effort is normal       Breath sounds: Normal breath sounds  No wheezing, rhonchi or rales  Abdominal:      General: Bowel sounds are normal       Palpations: Abdomen is soft  Musculoskeletal:         General: Normal range of motion  Lymphadenopathy:      Cervical: No cervical adenopathy  Skin:     General: Skin is warm and dry  Neurological:      Mental Status: He is alert and oriented to person, place, and time  Deep Tendon Reflexes: Reflexes are normal and symmetric  Psychiatric:         Mood and Affect: Mood normal          Behavior: Behavior normal          Thought Content:  Thought content normal          Judgment: Judgment normal        Christean Cushing, MD

## 2023-06-30 NOTE — ASSESSMENT & PLAN NOTE
Review of the patient's depression symptoms today indicates continued good control of his symptoms  Recommend the continuation of sertraline at 100 mg daily in the morning  New prescriptions were sent to his pharmacy  Follow-up evaluation is recommended in 3 months

## 2023-07-13 ENCOUNTER — SOCIAL WORK (OUTPATIENT)
Dept: BEHAVIORAL/MENTAL HEALTH CLINIC | Facility: CLINIC | Age: 43
End: 2023-07-13
Payer: COMMERCIAL

## 2023-07-13 DIAGNOSIS — F33.9 EPISODE OF RECURRENT MAJOR DEPRESSIVE DISORDER, UNSPECIFIED DEPRESSION EPISODE SEVERITY (HCC): Primary | ICD-10-CM

## 2023-07-13 PROCEDURE — 90837 PSYTX W PT 60 MINUTES: CPT | Performed by: SOCIAL WORKER

## 2023-07-13 NOTE — PSYCH
Behavioral Health Psychotherapy Progress Note    Psychotherapy Provided: Individual Psychotherapy     1. Episode of recurrent major depressive disorder, unspecified depression episode severity ([address])            Goals addressed in session: Goal 1     DATA: Harley Diaz doing well overall. Denies any acute depressive symptoms. No anxiety issues. Doing well at work and not getting overwhelmed. Less fixated on his relationship to ex-fiance. Has been more focused on cultivating and engaging in more social activities with friends. Has been swimming regularly at the [facility] which has been beneficial emotionally and physically. No SI. More relaxed and affect noticeably brighter. During this session, this clinician used the following therapeutic modalities: Solution-Focused Therapy and Supportive Psychotherapy    Substance Abuse was addressed during this session. If the client is diagnosed with a co-occurring substance use disorder, please indicate any changes in the frequency or amount of use: social alcohol use. Stage of change for addressing substance use diagnoses: No substance use/Not applicable    ASSESSMENT:  Lucas Bolton presents with a Euthymic/ normal mood. his affect is Normal range and intensity, which is congruent, with his mood and the content of the session. The client has made progress on their goals. Lucas Bolton presents with a minimal risk of suicide, minimal risk of self-harm, and minimal risk of harm to others. For any risk assessment that surpasses a "low" rating, a safety plan must be developed. A safety plan was indicated: no  If yes, describe in detail n/a    PLAN: Between sessions, Lucas Bolton will continue to utilize appropriate coping strategies and productive outlets in an effort to maintain current stability. Provided positive reinforcement for his efforts. . At the next session, the therapist will use Solution-Focused Therapy and Supportive Psychotherapy to address depression. Behavioral Health Treatment Plan and Discharge Planning: Sukhwinder Anders is aware of and agrees to continue to work on their treatment plan. They have identified and are working toward their discharge goals.  no    Visit start and stop times: 12:00-1:00    07/13/23

## 2023-08-10 ENCOUNTER — SOCIAL WORK (OUTPATIENT)
Dept: BEHAVIORAL/MENTAL HEALTH CLINIC | Facility: CLINIC | Age: 43
End: 2023-08-10
Payer: COMMERCIAL

## 2023-08-10 DIAGNOSIS — F33.9 EPISODE OF RECURRENT MAJOR DEPRESSIVE DISORDER, UNSPECIFIED DEPRESSION EPISODE SEVERITY (HCC): Primary | ICD-10-CM

## 2023-08-10 PROCEDURE — 90837 PSYTX W PT 60 MINUTES: CPT | Performed by: SOCIAL WORKER

## 2023-08-10 NOTE — PSYCH
Behavioral Health Psychotherapy Progress Note    Psychotherapy Provided: Individual Psychotherapy     1. Episode of recurrent major depressive disorder, unspecified depression episode severity ([address])            Goals addressed in session: Goal 1     DATA:   During this session, this clinician used the following therapeutic modalities: Solution-Focused Therapy and Supportive Psychotherapy    Substance Abuse was addressed during this session. If the client is diagnosed with a co-occurring substance use disorder, please indicate any changes in the frequency or amount of use: none. Stage of change for addressing substance use diagnoses: No substance use/Not applicable    ASSESSMENT:  Afia Altamirano presents with a Euthymic/ normal mood. his affect is Normal range and intensity, which is congruent, with his mood and the content of the session. The client has made progress on their goals. Afia Altamirano presents with a minimal risk of suicide, minimal risk of self-harm, and minimal risk of harm to others. For any risk assessment that surpasses a "low" rating, a safety plan must be developed. A safety plan was indicated: no  If yes, describe in detail n/a    PLAN: Between sessions, Afia Altamirano will . At the next session, the therapist will use Solution-Focused Therapy and Supportive Psychotherapy to address . Behavioral Health Treatment Plan and Discharge Planning: Afia Altamirano is aware of and agrees to continue to work on their treatment plan. They have identified and are working toward their discharge goals.  no    Visit start and stop times: 12:00-1:00    08/10/23

## 2023-08-17 ENCOUNTER — TELEPHONE (OUTPATIENT)
Dept: INTERNAL MEDICINE CLINIC | Facility: CLINIC | Age: 43
End: 2023-08-17

## 2023-08-17 DIAGNOSIS — A60.01 HERPES SIMPLEX INFECTION OF PENIS: ICD-10-CM

## 2023-08-17 RX ORDER — VALACYCLOVIR HYDROCHLORIDE 1 G/1
1000 TABLET, FILM COATED ORAL 2 TIMES DAILY
Qty: 20 TABLET | Refills: 0 | Status: SHIPPED | OUTPATIENT
Start: 2023-08-17 | End: 2023-08-27

## 2023-08-17 NOTE — TELEPHONE ENCOUNTER
Pt is requesting a refill on generic valtrex.   I do not see it on his active med list.  Please send to [facility]

## 2023-09-07 ENCOUNTER — SOCIAL WORK (OUTPATIENT)
Dept: BEHAVIORAL/MENTAL HEALTH CLINIC | Facility: CLINIC | Age: 43
End: 2023-09-07
Payer: COMMERCIAL

## 2023-09-07 DIAGNOSIS — F33.9 EPISODE OF RECURRENT MAJOR DEPRESSIVE DISORDER, UNSPECIFIED DEPRESSION EPISODE SEVERITY (HCC): Primary | ICD-10-CM

## 2023-09-07 PROCEDURE — 90837 PSYTX W PT 60 MINUTES: CPT | Performed by: SOCIAL WORKER

## 2023-09-07 NOTE — PSYCH
Behavioral Health Psychotherapy Progress Note    Psychotherapy Provided: Individual Psychotherapy     1. Episode of recurrent major depressive disorder, unspecified depression episode severity ([address])            Goals addressed in session: Goal 1     DATA: Candace Santiago denies any acute issues. Presents as pleasant, verbal, cooperative and well oriented. Denies any depressive symptoms. Has been active socially with family and friends. He is now dating and has been very active socially with his girlfriend. Much less fixated on his ex-fiance. When he is not with his current girlfriend, he worries that this may be a rebound relationship. When he is in her company, he does not have these thoughts or overthink things as he tends to do. Has many plans in the future with his peers and girlfriend that includes travel. Denies any stress at work. No issues with anxiety. During this session, this clinician used the following therapeutic modalities: Solution-Focused Therapy and Supportive Psychotherapy    Substance Abuse was addressed during this session. If the client is diagnosed with a co-occurring substance use disorder, please indicate any changes in the frequency or amount of use: none. Stage of change for addressing substance use diagnoses: No substance use/Not applicable    ASSESSMENT:  Cortez Treadwell presents with a Euthymic/ normal mood. his affect is Normal range and intensity, which is congruent, with his mood and the content of the session. The client has made progress on their goals. Cortez Treadwell presents with a minimal risk of suicide, minimal risk of self-harm, and minimal risk of harm to others. For any risk assessment that surpasses a "low" rating, a safety plan must be developed.     A safety plan was indicated: no  If yes, describe in detail N/A    PLAN: Between sessions, Cortez Treadwell will utilize cognitive restructuring strategies reviewed during session to guard against over-thinking and seeing his current relationship in a more accurate and positive fashion. Reviewed boundaries to maintain with ex-fiance. He is aware of my pending departure. Next session will be our last session. . At the next session, the therapist will use Solution-Focused Therapy and Supportive Psychotherapy to address mood issues. Behavioral Health Treatment Plan and Discharge Planning: Dennyscollins Arzola is aware of and agrees to continue to work on their treatment plan. They have identified and are working toward their discharge goals.  yes    Visit start and stop times: 12:00-1:00    09/07/23

## 2023-10-06 ENCOUNTER — OFFICE VISIT (OUTPATIENT)
Dept: INTERNAL MEDICINE CLINIC | Facility: CLINIC | Age: 43
End: 2023-10-06
Payer: COMMERCIAL

## 2023-10-06 VITALS
WEIGHT: 214.8 LBS | BODY MASS INDEX: 33.71 KG/M2 | SYSTOLIC BLOOD PRESSURE: 120 MMHG | OXYGEN SATURATION: 96 % | TEMPERATURE: 96.9 F | DIASTOLIC BLOOD PRESSURE: 76 MMHG | HEIGHT: 67 IN | HEART RATE: 74 BPM

## 2023-10-06 DIAGNOSIS — F33.9 EPISODE OF RECURRENT MAJOR DEPRESSIVE DISORDER, UNSPECIFIED DEPRESSION EPISODE SEVERITY (HCC): Primary | ICD-10-CM

## 2023-10-06 DIAGNOSIS — N52.9 ERECTILE DYSFUNCTION, UNSPECIFIED ERECTILE DYSFUNCTION TYPE: ICD-10-CM

## 2023-10-06 PROBLEM — A60.01 HERPES SIMPLEX INFECTION OF PENIS: Status: RESOLVED | Noted: 2022-05-13 | Resolved: 2023-10-06

## 2023-10-06 PROCEDURE — 99214 OFFICE O/P EST MOD 30 MIN: CPT | Performed by: INTERNAL MEDICINE

## 2023-10-06 RX ORDER — SERTRALINE HYDROCHLORIDE 25 MG/1
100 TABLET, FILM COATED ORAL DAILY
Qty: 30 TABLET | Refills: 4 | Status: SHIPPED | OUTPATIENT
Start: 2023-10-06 | End: 2024-04-03

## 2023-10-06 RX ORDER — SILDENAFIL 50 MG/1
50 TABLET, FILM COATED ORAL AS NEEDED
Qty: 10 TABLET | Refills: 2 | Status: SHIPPED | OUTPATIENT
Start: 2023-10-06

## 2023-10-06 NOTE — ASSESSMENT & PLAN NOTE
Patient is doing well on 50 mg of sertraline daily would like to try going down to 25 mg. Instructed him to go ahead and decrease the dose at this time new prescription has been sent to his pharmacy for 25 mg pills. Should he experience any increase in depression symptoms he knows to notify me immediately.

## 2023-10-06 NOTE — PROGRESS NOTES
Name: Quinn Naqvi      : 1980      MRN: [id number]  Encounter Provider: Loren Clark MD  Encounter Date: 10/6/2023   Encounter department: [facility]    Assessment & Plan     1. Episode of recurrent major depressive disorder, unspecified depression episode severity ([address])  Assessment & Plan:  Patient is doing well on 50 mg of sertraline daily would like to try going down to 25 mg. Instructed him to go ahead and decrease the dose at this time new prescription has been sent to his pharmacy for 25 mg pills. Should he experience any increase in depression symptoms he knows to notify me immediately. Orders:  -     sertraline (ZOLOFT) 25 mg tablet; Take 4 tablets (100 mg total) by mouth daily    2. Erectile dysfunction, unspecified erectile dysfunction type  Assessment & Plan:  Symptoms of erectile dysfunction reviewed with the patient during today's visit a trial of sildenafil has been recommended patient is agreeable and proper use of the medication has been reviewed during today's visit. Orders:  -     sildenafil (VIAGRA) 50 MG tablet; Take 1 tablet (50 mg total) by mouth as needed for erectile dysfunction         Subjective      This pleasant 77-year-old gentleman returns today for a routine follow-up visit. He has a diagnosis of depression disorder and is on sertraline. Previously on 100 mg that dose has been weaned down to 50 mg and the patient indicates that he continues to feel good on this reduced dose. Currently without any symptoms of depression he would like to try going down further on the medication. We have discussed decreasing the dose of the sertraline to 25 mg daily. Patient will notify me if he sees any rebound of symptoms of depression. We did discuss with the patient today erectile dysfunction symptoms.   He has been prescribed sildenafil for a trial.  Proper use of this medication has been reviewed with the patient today    Review of Systems   Genitourinary:        ED   Psychiatric/Behavioral: Positive for dysphoric mood. Current Outpatient Medications on File Prior to Visit   Medication Sig   • dupilumab (DUPIXENT) subcutaneous injection    • InFLIXimab (REMICADE IV) Infuse 10 mL/kg into a venous catheter. • [DISCONTINUED] sertraline (ZOLOFT) 100 mg tablet Take 1 tablet (100 mg total) by mouth daily   • valACYclovir (VALTREX) 1,000 mg tablet Take 1 tablet (1,000 mg total) by mouth 2 (two) times a day for 10 days   • [DISCONTINUED] sildenafil (VIAGRA) 50 MG tablet Take 50 mg by mouth as needed in the morning for erectile dysfunction. (Patient not taking: Reported on 6/6/2023)       Objective     /76   Pulse 74   Temp (!) 96.9 °F (36.1 °C)   Ht 5' 7" (1.702 m)   Wt 97.4 kg (214 lb 12.8 oz)   SpO2 96%   BMI 33.64 kg/m²     Physical Exam  Constitutional:       General: He is not in acute distress. Appearance: He is well-developed. He is not ill-appearing. HENT:      Right Ear: Hearing and external ear normal.      Left Ear: Hearing and external ear normal.      Nose: Nose normal.   Eyes:      Conjunctiva/sclera: Conjunctivae normal.      Pupils: Pupils are equal, round, and reactive to light. Neck:      Thyroid: No thyromegaly. Cardiovascular:      Rate and Rhythm: Normal rate and regular rhythm. Heart sounds: Normal heart sounds, S1 normal and S2 normal. No murmur heard. Pulmonary:      Effort: Pulmonary effort is normal.      Breath sounds: Normal breath sounds. No wheezing, rhonchi or rales. Abdominal:      General: Bowel sounds are normal.      Palpations: Abdomen is soft. Musculoskeletal:         General: Normal range of motion. Lymphadenopathy:      Cervical: No cervical adenopathy. Skin:     General: Skin is warm and dry. Neurological:      Mental Status: He is alert and oriented to person, place, and time. Deep Tendon Reflexes: Reflexes are normal and symmetric.    Psychiatric:         Mood and Affect: Mood normal.         Behavior: Behavior normal.         Thought Content:  Thought content normal.         Judgment: Judgment normal.       Amy Briscoe MD

## 2023-10-06 NOTE — ASSESSMENT & PLAN NOTE
Symptoms of erectile dysfunction reviewed with the patient during today's visit a trial of sildenafil has been recommended patient is agreeable and proper use of the medication has been reviewed during today's visit.

## 2023-10-12 ENCOUNTER — SOCIAL WORK (OUTPATIENT)
Dept: BEHAVIORAL/MENTAL HEALTH CLINIC | Facility: CLINIC | Age: 43
End: 2023-10-12
Payer: COMMERCIAL

## 2023-10-12 DIAGNOSIS — F33.9 EPISODE OF RECURRENT MAJOR DEPRESSIVE DISORDER, UNSPECIFIED DEPRESSION EPISODE SEVERITY (HCC): Primary | ICD-10-CM

## 2023-10-12 PROCEDURE — 90837 PSYTX W PT 60 MINUTES: CPT | Performed by: SOCIAL WORKER

## 2023-10-12 NOTE — PSYCH
Behavioral Health Psychotherapy Progress Note    Psychotherapy Provided: Individual Psychotherapy     1. Episode of recurrent major depressive disorder, unspecified depression episode severity ([address])            Goals addressed in session: Goal 1     DATA: Jossy Cooper has been doing well. Mood has remained stable to point his PCP has reduced antidepressant. He continues to be in a supportive dating relationship and is no longer fixated on his ex-fiance and the ending of their relationship. He has been consistently active socially with her, his friends and his brother. More active with hobbies and interests. Focused on progress made as evidenced by his current dating relationship and his mood stability. He is in agreement and presents accordingly. Aware this will be our last session. During this session, this clinician used the following therapeutic modalities: Supportive Psychotherapy    Substance Abuse was addressed during this session. If the client is diagnosed with a co-occurring substance use disorder, please indicate any changes in the frequency or amount of use: social alcohol use. Stage of change for addressing substance use diagnoses: No substance use/Not applicable    ASSESSMENT:  Angelita Batres presents with a Euthymic/ normal mood. his affect is Normal range and intensity, which is congruent, with his mood and the content of the session. The client has made progress on their goals. Angelita Batres presents with a minimal risk of suicide, minimal risk of self-harm, and minimal risk of harm to others. For any risk assessment that surpasses a "low" rating, a safety plan must be developed. A safety plan was indicated: no  If yes, describe in detail n/a    PLAN: Focused on progress made since initial session and he is aware of same. Reinforced need to continue to utilize current coping strategies and outlets to maintain stability.  He declines referral to another therapist. Bhavna Mukherjee to contact me if this would change. Will discharge Simone from therapy. Behavioral Health Treatment Plan and Discharge Planning: Donovan Lehman is aware of and agrees to continue to work on their treatment plan. They have identified and are working toward their discharge goals. yes    Visit start and stop times: 12:10-1:05    Psychotherapy Discharge Summary    Preferred Name: Donovan Lehman  YOB: 1980    Admission date to psychotherapy: 12/1/22    Referred by: PCP    Presenting Problem: Depression/anxiety secondary to situational stress    Course of treatment included : individual therapy     Progress/Outcome of Treatment Goals (brief summary of course of treatment) Liana Garcia made significant progress managing the stress, anxiety and depression that was primari;senait relted to the break-up of his engagement. Has recovered to the point that he has entered into a positive dating relationship and has returned to his previous level of social and vocational functioning. Treatment Complications (if any): none    Treatment Progress: good    Current [facility] Provider: none    Discharge Medications include: see PCP note    Discharge Date: 10/12/23    Discharge Diagnosis:   1.  Episode of recurrent major depressive disorder, unspecified depression episode severity ([address])            Criteria for Discharge: completed treatment goals and objectives and is no longer in need of services    Aftercare recommendations include (include specific referral names and phone numbers, if appropriate): none    Prognosis: good      10/12/23

## 2023-10-26 ENCOUNTER — LAB REQUISITION (OUTPATIENT)
Dept: LAB | Facility: HOSPITAL | Age: 43
End: 2023-10-26
Payer: COMMERCIAL

## 2023-10-26 DIAGNOSIS — K50.90 CROHN'S DISEASE, UNSPECIFIED, WITHOUT COMPLICATIONS (HCC): ICD-10-CM

## 2023-10-26 LAB
ALBUMIN SERPL BCP-MCNC: 4 G/DL (ref 3.5–5)
ALP SERPL-CCNC: 54 U/L (ref 34–104)
ALT SERPL W P-5'-P-CCNC: 30 U/L (ref 7–52)
ANION GAP SERPL CALCULATED.3IONS-SCNC: 9 MMOL/L
AST SERPL W P-5'-P-CCNC: 21 U/L (ref 13–39)
BASOPHILS # BLD AUTO: 0.03 THOUSANDS/ÂΜL (ref 0–0.1)
BASOPHILS NFR BLD AUTO: 0 % (ref 0–1)
BILIRUB SERPL-MCNC: 0.82 MG/DL (ref 0.2–1)
BUN SERPL-MCNC: 11 MG/DL (ref 5–25)
CALCIUM SERPL-MCNC: 8.8 MG/DL (ref 8.4–10.2)
CHLORIDE SERPL-SCNC: 102 MMOL/L (ref 96–108)
CO2 SERPL-SCNC: 27 MMOL/L (ref 21–32)
CREAT SERPL-MCNC: 0.86 MG/DL (ref 0.6–1.3)
EOSINOPHIL # BLD AUTO: 0.09 THOUSAND/ÂΜL (ref 0–0.61)
EOSINOPHIL NFR BLD AUTO: 1 % (ref 0–6)
ERYTHROCYTE [DISTWIDTH] IN BLOOD BY AUTOMATED COUNT: 13.7 % (ref 11.6–15.1)
GFR SERPL CREATININE-BSD FRML MDRD: 106 ML/MIN/1.73SQ M
GLUCOSE SERPL-MCNC: 90 MG/DL (ref 65–140)
HCT VFR BLD AUTO: 39.7 % (ref 36.5–49.3)
HGB BLD-MCNC: 13.6 G/DL (ref 12–17)
IMM GRANULOCYTES # BLD AUTO: 0.1 THOUSAND/UL (ref 0–0.2)
IMM GRANULOCYTES NFR BLD AUTO: 2 % (ref 0–2)
LYMPHOCYTES # BLD AUTO: 2.45 THOUSANDS/ÂΜL (ref 0.6–4.47)
LYMPHOCYTES NFR BLD AUTO: 36 % (ref 14–44)
MCH RBC QN AUTO: 30.6 PG (ref 26.8–34.3)
MCHC RBC AUTO-ENTMCNC: 34.3 G/DL (ref 31.4–37.4)
MCV RBC AUTO: 89 FL (ref 82–98)
MONOCYTES # BLD AUTO: 0.87 THOUSAND/ÂΜL (ref 0.17–1.22)
MONOCYTES NFR BLD AUTO: 13 % (ref 4–12)
NEUTROPHILS # BLD AUTO: 3.3 THOUSANDS/ÂΜL (ref 1.85–7.62)
NEUTS SEG NFR BLD AUTO: 48 % (ref 43–75)
NRBC BLD AUTO-RTO: 0 /100 WBCS
PLATELET # BLD AUTO: 199 THOUSANDS/UL (ref 149–390)
PMV BLD AUTO: 10.7 FL (ref 8.9–12.7)
POTASSIUM SERPL-SCNC: 4.2 MMOL/L (ref 3.5–5.3)
PROT SERPL-MCNC: 6.6 G/DL (ref 6.4–8.4)
RBC # BLD AUTO: 4.44 MILLION/UL (ref 3.88–5.62)
SODIUM SERPL-SCNC: 138 MMOL/L (ref 135–147)
WBC # BLD AUTO: 6.84 THOUSAND/UL (ref 4.31–10.16)

## 2023-10-26 PROCEDURE — 80053 COMPREHEN METABOLIC PANEL: CPT | Performed by: INTERNAL MEDICINE

## 2023-10-26 PROCEDURE — 85025 COMPLETE CBC W/AUTO DIFF WBC: CPT | Performed by: INTERNAL MEDICINE

## 2023-11-07 ENCOUNTER — APPOINTMENT (OUTPATIENT)
Dept: LAB | Facility: CLINIC | Age: 43
End: 2023-11-07
Payer: COMMERCIAL

## 2023-11-07 DIAGNOSIS — K50.90 CROHN'S DISEASE WITHOUT COMPLICATION, UNSPECIFIED GASTROINTESTINAL TRACT LOCATION (HCC): ICD-10-CM

## 2023-11-07 DIAGNOSIS — D84.9 IMMUNOCOMPROMISED PATIENT (HCC): ICD-10-CM

## 2023-11-07 LAB
ALBUMIN SERPL BCP-MCNC: 4.2 G/DL (ref 3.5–5)
ALP SERPL-CCNC: 50 U/L (ref 34–104)
ALT SERPL W P-5'-P-CCNC: 17 U/L (ref 7–52)
ANION GAP SERPL CALCULATED.3IONS-SCNC: 10 MMOL/L
AST SERPL W P-5'-P-CCNC: 20 U/L (ref 13–39)
BASOPHILS # BLD AUTO: 0.04 THOUSANDS/ÂΜL (ref 0–0.1)
BASOPHILS NFR BLD AUTO: 1 % (ref 0–1)
BILIRUB SERPL-MCNC: 0.87 MG/DL (ref 0.2–1)
BUN SERPL-MCNC: 15 MG/DL (ref 5–25)
CALCIUM SERPL-MCNC: 8.9 MG/DL (ref 8.4–10.2)
CHLORIDE SERPL-SCNC: 98 MMOL/L (ref 96–108)
CO2 SERPL-SCNC: 29 MMOL/L (ref 21–32)
CREAT SERPL-MCNC: 0.82 MG/DL (ref 0.6–1.3)
CRP SERPL QL: 1.6 MG/L
EOSINOPHIL # BLD AUTO: 0.07 THOUSAND/ÂΜL (ref 0–0.61)
EOSINOPHIL NFR BLD AUTO: 1 % (ref 0–6)
ERYTHROCYTE [DISTWIDTH] IN BLOOD BY AUTOMATED COUNT: 13.5 % (ref 11.6–15.1)
GFR SERPL CREATININE-BSD FRML MDRD: 108 ML/MIN/1.73SQ M
GLUCOSE P FAST SERPL-MCNC: 79 MG/DL (ref 65–99)
HBV CORE AB SER QL: NORMAL
HBV CORE IGM SER QL: NORMAL
HBV SURFACE AG SER QL: NORMAL
HCT VFR BLD AUTO: 41.1 % (ref 36.5–49.3)
HCV AB SER QL: NORMAL
HGB BLD-MCNC: 13.5 G/DL (ref 12–17)
IMM GRANULOCYTES # BLD AUTO: 0.05 THOUSAND/UL (ref 0–0.2)
IMM GRANULOCYTES NFR BLD AUTO: 1 % (ref 0–2)
LYMPHOCYTES # BLD AUTO: 3.27 THOUSANDS/ÂΜL (ref 0.6–4.47)
LYMPHOCYTES NFR BLD AUTO: 40 % (ref 14–44)
MCH RBC QN AUTO: 29.2 PG (ref 26.8–34.3)
MCHC RBC AUTO-ENTMCNC: 32.8 G/DL (ref 31.4–37.4)
MCV RBC AUTO: 89 FL (ref 82–98)
MONOCYTES # BLD AUTO: 0.86 THOUSAND/ÂΜL (ref 0.17–1.22)
MONOCYTES NFR BLD AUTO: 11 % (ref 4–12)
NEUTROPHILS # BLD AUTO: 3.92 THOUSANDS/ÂΜL (ref 1.85–7.62)
NEUTS SEG NFR BLD AUTO: 46 % (ref 43–75)
NRBC BLD AUTO-RTO: 0 /100 WBCS
PLATELET # BLD AUTO: 224 THOUSANDS/UL (ref 149–390)
PMV BLD AUTO: 10.5 FL (ref 8.9–12.7)
POTASSIUM SERPL-SCNC: 4 MMOL/L (ref 3.5–5.3)
PROT SERPL-MCNC: 7.1 G/DL (ref 6.4–8.4)
RBC # BLD AUTO: 4.62 MILLION/UL (ref 3.88–5.62)
SODIUM SERPL-SCNC: 137 MMOL/L (ref 135–147)
WBC # BLD AUTO: 8.21 THOUSAND/UL (ref 4.31–10.16)

## 2023-11-07 PROCEDURE — 87340 HEPATITIS B SURFACE AG IA: CPT

## 2023-11-07 PROCEDURE — 85025 COMPLETE CBC W/AUTO DIFF WBC: CPT

## 2023-11-07 PROCEDURE — 80053 COMPREHEN METABOLIC PANEL: CPT

## 2023-11-07 PROCEDURE — 36415 COLL VENOUS BLD VENIPUNCTURE: CPT

## 2023-11-07 PROCEDURE — 86705 HEP B CORE ANTIBODY IGM: CPT

## 2023-11-07 PROCEDURE — 86480 TB TEST CELL IMMUN MEASURE: CPT

## 2023-11-07 PROCEDURE — 86803 HEPATITIS C AB TEST: CPT

## 2023-11-07 PROCEDURE — 86140 C-REACTIVE PROTEIN: CPT

## 2023-11-07 PROCEDURE — 86704 HEP B CORE ANTIBODY TOTAL: CPT

## 2023-11-08 LAB
GAMMA INTERFERON BACKGROUND BLD IA-ACNC: 0.28 IU/ML
M TB IFN-G BLD-IMP: NEGATIVE
M TB IFN-G CD4+ BCKGRND COR BLD-ACNC: -0.04 IU/ML
M TB IFN-G CD4+ BCKGRND COR BLD-ACNC: 0.07 IU/ML
MITOGEN IGNF BCKGRD COR BLD-ACNC: 9.72 IU/ML

## 2023-11-13 ENCOUNTER — DOCUMENTATION (OUTPATIENT)
Dept: PSYCHIATRY | Facility: CLINIC | Age: 43
End: 2023-11-13

## 2023-11-13 NOTE — PROGRESS NOTES
Psychotherapy Discharge Summary    Preferred Name: Jennifer Petty  YOB: 1980    Admission date to psychotherapy: 12/1/22    Referred by: PCP    Presenting Problem: Depression    Course of treatment included : individual therapy     Progress/Outcome of Treatment Goals (brief summary of course of treatment) Seen for 12 sessions for depression stemming from break-up with his fiance and loss of father. When last seen on 10/12/23, he was stable and continued to function much better vocationally and socially.      Treatment Complications (if any): none    Treatment Progress: good    Current [facility] Provider: none    Discharge Medications include: Sertraline via PCP    Discharge Date: 11/13/23    Discharge Diagnosis: Episode of recurrent major depressive disorder, unspecified depression episode severity    Criteria for Discharge: completed treatment goals and objectives and is no longer in need of services    Aftercare recommendations include (include specific referral names and phone numbers, if appropriate): none    Prognosis: good

## 2023-11-27 ENCOUNTER — OFFICE VISIT (OUTPATIENT)
Dept: GASTROENTEROLOGY | Facility: CLINIC | Age: 43
End: 2023-11-27
Payer: COMMERCIAL

## 2023-11-27 ENCOUNTER — TELEPHONE (OUTPATIENT)
Dept: GASTROENTEROLOGY | Facility: CLINIC | Age: 43
End: 2023-11-27

## 2023-11-27 VITALS
SYSTOLIC BLOOD PRESSURE: 124 MMHG | OXYGEN SATURATION: 98 % | BODY MASS INDEX: 35.3 KG/M2 | HEART RATE: 69 BPM | DIASTOLIC BLOOD PRESSURE: 83 MMHG | WEIGHT: 225.4 LBS | TEMPERATURE: 97.8 F

## 2023-11-27 DIAGNOSIS — D84.9 IMMUNOCOMPROMISED PATIENT (HCC): ICD-10-CM

## 2023-11-27 DIAGNOSIS — R19.7 DIARRHEA, UNSPECIFIED TYPE: ICD-10-CM

## 2023-11-27 DIAGNOSIS — K50.90 CROHN'S DISEASE WITHOUT COMPLICATION, UNSPECIFIED GASTROINTESTINAL TRACT LOCATION (HCC): Primary | ICD-10-CM

## 2023-11-27 PROCEDURE — 99214 OFFICE O/P EST MOD 30 MIN: CPT | Performed by: INTERNAL MEDICINE

## 2023-11-27 NOTE — PROGRESS NOTES
[facility] - Outpatient Follow-up Note  Amarilis Sloan 37 y.o. male MRN: [id number]  Encounter: [id number]          ASSESSMENT AND PLAN:    Amarilis Sloan is a 37 y.o. male with longstanding Crohn's disease diagnosed at age 24 currently on Remicade and overall doing well with intermittent symptoms that were thought to possibly be related from chronic changes of Remicade versus active disease versus irritable bowel syndrome versus SIBO versus other pathology. Overall he is feeling well with only intermittent symptoms. He had a colonoscopy in June 2022 that showed some scarring in the terminal ileum and nonobstructing stricture that appeared to be chronic in nature as well as mild erythema in the distal rectum and biopsies of the terminal ileum were benign and cecum showed increased lamina propria inflammation but the remainder of the biopsies did not show pathologic abnormalities except for the rectum with minimal active inflammation. MR enterography from August 2022 with changes of chronic inflammatory bowel disease involving 12 to 15 cm of the terminal ileum and probable nonobstructing stricturing but no active inflammation. Recent blood work from November with a normal CMP including electrolytes and liver tests as well as creatinine. Normal CBC with no evidence of anemia, thrombocytopenia, leukocytosis. CRP was normal at 1.6. Hepatitis profile and quant gold also noted and negative. Infliximab level was 18 back in May 2022 and no antidrug antibodies. 1. Crohn's disease without complication, unspecified gastrointestinal tract location ([address])    2. Immunocompromised patient ([address])    3.  Diarrhea, unspecified type        Orders Placed This Encounter   Procedures    MRI enterography w wo    Infliximab Concentration and Anti-Infliximab Antibody([facility])    Colonoscopy     Continue infliximab 10 mg/kg every 6 weeks  Can repeat infliximab level in the future and potentially decrease amount of medication per infusion  Blood work in 4-6 months  Next quant gold and hepatitis panel 11/2024  Next colonoscopy 2024 and ordered today  Next MR enterography 2024 and ordered today  Drink at least 8 cups of water per day    [facility] Healthcare maintenance:  Avoid live virus vaccines  Yearly flu shot  COVID vaccine and booster  Pneumonia vaccine  Shingrix  Routine skin exams with the dermatologist    ______________________________________________________________________    SUBJECTIVE:    Adelina Gamboa is a 37 y.o. male who presents with complaint of Crohn's. He is doing well. He gained weight. Infusions going well. 2-5 BMs per day (5 bms per day, 1-3 times per month), mostly formed [place] 4, and rarely [place] 6  No bright red blood per rectum/rectal bleeding, no melena  Some urgency, occasional nocturnal BMs, no incontinence. no abdominal pain  No rashes, no mouth sores, no joint pains  No heartburn, dysphagia, odynophagia, nausea, vomiting  No weight loss.      Answers submitted by the patient for this visit:  Inflammatory Bowel Disease (Submitted on 11/27/2023)  When you are not experiencing symptoms of your inflammatory bowel disease, how many bowel movements do you typically have each day?: 5  What is the average (typical) number of bowel movements that you had in a single day during the last week?: 2  Over the last 3 days, have you had any bowel movements where you passed blood without stool?: No  Since your last visit, have you received any vaccinations?: Yes  Since the last visit, have you had an infection?: No  In the past three months, have you used tobacco in any form?: No  During the last year, how many days have you missed work or school because of your inflammatory bowel disease?: 14  During the last year, how many days have you been hospitalized because of your inflammatory bowel disease?: 0  During the last year, how many days have you visited a hospital emergency department because of your inflammatory bowel disease?: 0  During the last month, have you taken narcotic pain medications (such as Percocet, oxycodone, Oxycontin, morphine, Vicodin, Dilaudid, MS Contin) for your inflammatory bowel disease?: No  Have you awoken at night because you needed to move your bowels during the last month? : Yes  Have you had leakage of stool while sleeping during the last month?: No  Have you had leakage of stool while you were awake during the last month?: No  In the last 6 months, have you unintentionally lost weight?: No  Fever: No  Eye irritation: No  Mouth sores: No  Sore throat: No  Chest pain: No  Shortness of breath: No  Numbness or tingling in your hands or feet: No  Skin rash: No  Pain or swelling in your joints: No  Bruising or bleeding: No  Felt depressed or blue: No      REVIEW OF SYSTEMS IS OTHERWISE NEGATIVE. 10 point ROS reviewed and negative, except as above      Historical Information   Past Medical History:   Diagnosis Date    Arthritis     Crohn's disease ([facility])     Eczema     Rheumatoid arthritis ([facility])      Past Surgical History:   Procedure Laterality Date    COLONOSCOPY N/A 1/18/2016    Procedure: COLONOSCOPY;  Surgeon: Bailey Bertrand MD;  Location: [facility]; Service:      Social History   Social History     Substance and Sexual Activity   Alcohol Use Not Currently    Alcohol/week: 6.0 standard drinks of alcohol    Types: 3 Cans of beer, 3 Standard drinks or equivalent per week     Social History     Substance and Sexual Activity   Drug Use No     Social History     Tobacco Use   Smoking Status Never   Smokeless Tobacco Never     History reviewed. No pertinent family history.     Meds/Allergies       Current Outpatient Medications:     dupilumab (DUPIXENT) subcutaneous injection    InFLIXimab (REMICADE IV)    sertraline (ZOLOFT) 25 mg tablet    sildenafil (VIAGRA) 50 MG tablet    valACYclovir (VALTREX) 1,000 mg tablet    Allergies   Allergen Reactions Shellfish-Derived Products - Food Allergy GI Intolerance           Objective     Blood pressure 124/83, pulse 69, temperature 97.8 °F (36.6 °C), temperature source Tympanic, weight 102 kg (225 lb 6.4 oz), SpO2 98 %. Body mass index is 35.3 kg/m². PHYSICAL EXAMINATION:    General Appearance:   Alert, cooperative, no distress   HEENT:  Normocephalic, atraumatic, anicteric. Neck supple, symmetrical, trachea midline. Lungs:   Equal chest rise and unlabored breathing, normal effort, no coughing. Cardiovascular:   No visualized JVD. Abdomen:   No abdominal distension. Skin:   No jaundice, rashes, or lesions. Musculoskeletal:   Normal range of motion visualized. Psych:  Normal affect and normal insight. Neuro:  Alert and appropriate. Lab Results:   No visits with results within 1 Day(s) from this visit.    Latest known visit with results is:   Appointment on 11/07/2023   Component Date Value    WBC 11/07/2023 8.21     RBC 11/07/2023 4.62     Hemoglobin 11/07/2023 13.5     Hematocrit 11/07/2023 41.1     MCV 11/07/2023 89     MCH 11/07/2023 29.2     MCHC 11/07/2023 32.8     RDW 11/07/2023 13.5     MPV 11/07/2023 10.5     Platelets 18/56/9500 224     nRBC 11/07/2023 0     Neutrophils Relative 11/07/2023 46     Immat GRANS % 11/07/2023 1     Lymphocytes Relative 11/07/2023 40     Monocytes Relative 11/07/2023 11     Eosinophils Relative 11/07/2023 1     Basophils Relative 11/07/2023 1     Neutrophils Absolute 11/07/2023 3.92     Immature Grans Absolute 11/07/2023 0.05     Lymphocytes Absolute 11/07/2023 3.27     Monocytes Absolute 11/07/2023 0.86     Eosinophils Absolute 11/07/2023 0.07     Basophils Absolute 11/07/2023 0.04     Sodium 11/07/2023 137     Potassium 11/07/2023 4.0     Chloride 11/07/2023 98     CO2 11/07/2023 29     ANION GAP 11/07/2023 10     BUN 11/07/2023 15     Creatinine 11/07/2023 0.82     Glucose, Fasting 11/07/2023 79     Calcium 11/07/2023 8.9     AST 11/07/2023 20     ALT 11/07/2023 17     Alkaline Phosphatase 11/07/2023 50     Total Protein 11/07/2023 7.1     Albumin 11/07/2023 4.2     Total Bilirubin 11/07/2023 0.87     eGFR 11/07/2023 108     CRP 11/07/2023 1.6     Hepatitis B Surface Ag 11/07/2023 Non-reactive     Hepatitis C Ab 11/07/2023 Non-reactive     Hep B C IgM 11/07/2023 Non-reactive     Hep B Core Total Ab 11/07/2023 Non-reactive     QFT Nil 11/07/2023 0.28     QFT TB1-NIL 11/07/2023 -0.04     QFT TB2-NIL 11/07/2023 0.07     QFT Mitogen-NIL 11/07/2023 9.72     QFT Final Interpretation 11/07/2023 Negative        Lab Results   Component Value Date    WBC 8.21 11/07/2023    HGB 13.5 11/07/2023    HCT 41.1 11/07/2023    MCV 89 11/07/2023     11/07/2023       Lab Results   Component Value Date    SODIUM 137 11/07/2023    K 4.0 11/07/2023    CL 98 11/07/2023    CO2 29 11/07/2023    AGAP 10 11/07/2023    BUN 15 11/07/2023    CREATININE 0.82 11/07/2023    GLUC 90 10/26/2023    GLUF 79 11/07/2023    CALCIUM 8.9 11/07/2023    AST 20 11/07/2023    ALT 17 11/07/2023    ALKPHOS 50 11/07/2023    TP 7.1 11/07/2023    TBILI 0.87 11/07/2023    EGFR 108 11/07/2023       Lab Results   Component Value Date    CRP 1.6 11/07/2023       No results found for: "[id number]", "TSH"    No results found for: "IRON", "TIBC", "FERRITIN"    Radiology Results:   No results found.

## 2023-12-07 DIAGNOSIS — A60.01 HERPES SIMPLEX INFECTION OF PENIS: ICD-10-CM

## 2023-12-07 DIAGNOSIS — N52.9 ERECTILE DYSFUNCTION, UNSPECIFIED ERECTILE DYSFUNCTION TYPE: ICD-10-CM

## 2023-12-07 RX ORDER — SILDENAFIL 50 MG/1
50 TABLET, FILM COATED ORAL AS NEEDED
Qty: 10 TABLET | Refills: 0 | Status: SHIPPED | OUTPATIENT
Start: 2023-12-07

## 2023-12-07 RX ORDER — VALACYCLOVIR HYDROCHLORIDE 1 G/1
1000 TABLET, FILM COATED ORAL 2 TIMES DAILY
Qty: 20 TABLET | Refills: 0 | Status: SHIPPED | OUTPATIENT
Start: 2023-12-07 | End: 2023-12-17

## 2024-02-12 ENCOUNTER — OFFICE VISIT (OUTPATIENT)
Dept: INTERNAL MEDICINE CLINIC | Facility: CLINIC | Age: 44
End: 2024-02-12
Payer: COMMERCIAL

## 2024-02-12 VITALS
HEIGHT: 67 IN | SYSTOLIC BLOOD PRESSURE: 122 MMHG | BODY MASS INDEX: 36.26 KG/M2 | OXYGEN SATURATION: 97 % | HEART RATE: 74 BPM | DIASTOLIC BLOOD PRESSURE: 74 MMHG | WEIGHT: 231 LBS | RESPIRATION RATE: 15 BRPM

## 2024-02-12 DIAGNOSIS — Z13.220 SCREENING FOR HYPERLIPIDEMIA: ICD-10-CM

## 2024-02-12 DIAGNOSIS — N52.9 ERECTILE DYSFUNCTION, UNSPECIFIED ERECTILE DYSFUNCTION TYPE: ICD-10-CM

## 2024-02-12 DIAGNOSIS — F33.9 EPISODE OF RECURRENT MAJOR DEPRESSIVE DISORDER, UNSPECIFIED DEPRESSION EPISODE SEVERITY (HCC): Primary | ICD-10-CM

## 2024-02-12 DIAGNOSIS — K50.10 CROHN'S DISEASE OF LARGE INTESTINE WITHOUT COMPLICATION (HCC): ICD-10-CM

## 2024-02-12 DIAGNOSIS — Z13.1 SCREENING FOR DIABETES MELLITUS: ICD-10-CM

## 2024-02-12 DIAGNOSIS — B00.9 HERPES SIMPLEX: ICD-10-CM

## 2024-02-12 PROCEDURE — 99214 OFFICE O/P EST MOD 30 MIN: CPT | Performed by: INTERNAL MEDICINE

## 2024-02-12 RX ORDER — SERTRALINE HYDROCHLORIDE 25 MG/1
100 TABLET, FILM COATED ORAL DAILY
Qty: 30 TABLET | Refills: 5 | Status: SHIPPED | OUTPATIENT
Start: 2024-02-12 | End: 2024-08-10

## 2024-02-12 RX ORDER — SILDENAFIL 50 MG/1
50 TABLET, FILM COATED ORAL AS NEEDED
Qty: 10 TABLET | Refills: 4 | Status: SHIPPED | OUTPATIENT
Start: 2024-02-12

## 2024-02-13 PROBLEM — B00.9 HERPES SIMPLEX: Status: ACTIVE | Noted: 2024-02-13

## 2024-02-13 RX ORDER — ACYCLOVIR 400 MG/1
400 TABLET ORAL 2 TIMES DAILY
Qty: 60 TABLET | Refills: 6 | Status: SHIPPED | OUTPATIENT
Start: 2024-02-13 | End: 2024-09-10

## 2024-02-13 NOTE — ASSESSMENT & PLAN NOTE
The patient and his significant other are expecting a child later this year.  I recommended suppressing doses of acyclovir 400 mg twice a day to suppress his known diagnosis of herpes simplex during the pregnancy to minimize exposure of the child to herpes simplex.

## 2024-02-13 NOTE — PROGRESS NOTES
Name: Simone Steiner      : 1980      MRN: [id number]  Encounter Provider: Elías Silva MD  Encounter Date: 2024   Encounter department: [facility]    Assessment & Plan     1. Screening for diabetes mellitus  -     Comprehensive metabolic panel; Future    2. Episode of recurrent major depressive disorder, unspecified depression episode severity (HCC)  Assessment & Plan:  I reviewed the patient's depression symptoms indicates that they remain quite stable on sertralin 100 mg daily no apparent side effects of the medication noted recommend continuation of current dosing, on    Orders:  -     sertraline (ZOLOFT) 25 mg tablet; Take 4 tablets (100 mg total) by mouth daily    3. Erectile dysfunction, unspecified erectile dysfunction type  -     sildenafil (VIAGRA) 50 MG tablet; Take 1 tablet (50 mg total) by mouth as needed for erectile dysfunction    4. Screening for hyperlipidemia  -     Lipid panel; Future    5. Herpes simplex  Assessment & Plan:  The patient and his significant other are expecting a child later this year.  I recommended suppressing doses of acyclovir 400 mg twice a day to suppress his known diagnosis of herpes simplex during the pregnancy to minimize exposure of the child to herpes simplex.    Orders:  -     acyclovir (ZOVIRAX) 400 MG tablet; Take 1 tablet (400 mg total) by mouth 2 (two) times a day for 420 doses    6. Crohn's disease of large intestine without complication (HCC)  Assessment & Plan:  Crohn's disease remains quite stable at present time continuing on Remicade for treatment of his colitis             Subjective      This 43-year-old gentleman presents today in the presence of his significant other.  They are expecting their first child together later this year.  Patient provides us with an indication that his Crohn's disease remains stable.  He continues on treatment of sertraline for depression indicating the medication has been quite  "successful at controlling his depression symptoms.  He does have a history of herpes and we have advised him to take suppressive doses of acyclovir 5 400 mg twice a day.      Review of Systems   All other systems reviewed and are negative.      Current Outpatient Medications on File Prior to Visit   Medication Sig    dupilumab (DUPIXENT) subcutaneous injection     InFLIXimab (REMICADE IV) Infuse 10 mL/kg into a venous catheter.       Objective     /74   Pulse 74   Resp 15   Ht 5' 7\" (1.702 m)   Wt 105 kg (231 lb)   SpO2 97%   BMI 36.18 kg/m²     Physical Exam  Constitutional:       General: He is not in acute distress.     Appearance: Normal appearance. He is not ill-appearing.   HENT:      Head: Normocephalic.   Eyes:      Pupils: Pupils are equal, round, and reactive to light.   Cardiovascular:      Rate and Rhythm: Regular rhythm.      Heart sounds: Normal heart sounds.   Pulmonary:      Breath sounds: No wheezing, rhonchi or rales.   Musculoskeletal:      Right lower leg: No edema.      Left lower leg: No edema.   Neurological:      Mental Status: He is alert. Mental status is at baseline.   Psychiatric:         Mood and Affect: Mood normal.         Behavior: Behavior normal.         Thought Content: Thought content normal.         Judgment: Judgment normal.       Elías Silva MD    "

## 2024-02-13 NOTE — ASSESSMENT & PLAN NOTE
Crohn's disease remains quite stable at present time continuing on Remicade for treatment of his colitis

## 2024-02-13 NOTE — ASSESSMENT & PLAN NOTE
I reviewed the patient's depression symptoms indicates that they remain quite stable on sertralin 100 mg daily no apparent side effects of the medication noted recommend continuation of current dosing, on

## 2024-03-07 DIAGNOSIS — K50.90 CROHN'S DISEASE WITHOUT COMPLICATION, UNSPECIFIED GASTROINTESTINAL TRACT LOCATION (HCC): Primary | ICD-10-CM

## 2024-03-07 RX ORDER — PREDNISONE 10 MG/1
TABLET ORAL DAILY
Qty: 70 TABLET | Refills: 0 | Status: SHIPPED | OUTPATIENT
Start: 2024-03-07 | End: 2024-04-04

## 2024-03-07 NOTE — TELEPHONE ENCOUNTER
----- Message from Avis Webb sent at 3/7/2024  9:55 AM EST -----  Regarding: FW: Matthew   Contact: [phone]    ----- Message -----  From: Simone Steiner  Sent: 3/6/2024   7:28 PM EST  To: [facility]  Subject: Remicade                                         Dr. Hutchison,    Yes. Thank you.     Will you be able to send the prescription to the [facility] that I have on file?    Sincerely,  Simone Steiner

## 2024-03-28 DIAGNOSIS — K50.90 CROHN'S DISEASE WITHOUT COMPLICATION, UNSPECIFIED GASTROINTESTINAL TRACT LOCATION (HCC): ICD-10-CM

## 2024-03-28 RX ORDER — PREDNISONE 10 MG/1
TABLET ORAL DAILY
Qty: 70 TABLET | Refills: 0 | OUTPATIENT
Start: 2024-03-28 | End: 2024-04-24

## 2024-03-28 RX ORDER — PREDNISONE 10 MG/1
TABLET ORAL DAILY
Qty: 70 TABLET | Refills: 0 | Status: SHIPPED | OUTPATIENT
Start: 2024-03-28 | End: 2024-04-24

## 2024-04-23 ENCOUNTER — OFFICE VISIT (OUTPATIENT)
Dept: URGENT CARE | Age: 44
End: 2024-04-23
Payer: COMMERCIAL

## 2024-04-23 VITALS
DIASTOLIC BLOOD PRESSURE: 86 MMHG | HEART RATE: 76 BPM | SYSTOLIC BLOOD PRESSURE: 117 MMHG | RESPIRATION RATE: 20 BRPM | TEMPERATURE: 97.3 F | OXYGEN SATURATION: 98 %

## 2024-04-23 DIAGNOSIS — Z23 ENCOUNTER FOR IMMUNIZATION: ICD-10-CM

## 2024-04-23 DIAGNOSIS — S81.811A LACERATION OF RIGHT LOWER EXTREMITY, INITIAL ENCOUNTER: Primary | ICD-10-CM

## 2024-04-23 PROCEDURE — 99213 OFFICE O/P EST LOW 20 MIN: CPT

## 2024-04-23 PROCEDURE — 90471 IMMUNIZATION ADMIN: CPT

## 2024-04-23 PROCEDURE — 90715 TDAP VACCINE 7 YRS/> IM: CPT

## 2024-04-23 RX ORDER — CEPHALEXIN 500 MG/1
500 CAPSULE ORAL EVERY 6 HOURS SCHEDULED
Qty: 28 CAPSULE | Refills: 0 | Status: SHIPPED | OUTPATIENT
Start: 2024-04-23 | End: 2024-04-30

## 2024-04-23 NOTE — PATIENT INSTRUCTIONS
Please begin antibiotics as directed.   Tdap updated in office.   Go to emergency department for worsening symptoms.

## 2024-04-23 NOTE — PROGRESS NOTES
[facility]        NAME: Simnoe Steiner is a 44 y.o. male  : 1980    MRN: [id number]  DATE: 2024  TIME: 1:00 PM    Assessment and Plan   Laceration of right lower extremity, initial encounter [S81.811A]  1. Laceration of right lower extremity, initial encounter  cephalexin (KEFLEX) 500 mg capsule      2. Encounter for immunization  Tdap Vaccine greater than or equal to 8yo      Please begin antibiotics as directed.   Tdap updated in office.   Go to emergency department for worsening symptoms.     Patient Instructions   Wound Infection   WHAT YOU NEED TO KNOW:   A wound infection occurs when bacteria enters a break in the skin. The infection may involve just the skin, or affect deeper tissues or organs close to the wound.  DISCHARGE INSTRUCTIONS:   Return to the emergency department if:   You feel short of breath.     Your heart is beating faster than usual.     You feel confused.     Blood soaks through your bandages.     Your wound comes apart or feels like it is ripping.     You have severe pain.     You see red streaks coming from the infected area.     Call your doctor if:   You have a fever or chills.     You have more pain, redness, or swelling near your wound.     Your symptoms do not improve.     The skin around your wound feels numb.     You have questions or concerns about your condition or care.     Medicines:  You may need any of the following:  NSAIDs , such as ibuprofen, help decrease swelling, pain, and fever. This medicine is available with or without a doctor's order. NSAIDs can cause stomach bleeding or kidney problems in certain people. If you take blood thinner medicine, always ask your healthcare provider if NSAIDs are safe for you. Always read the medicine label and follow directions.     Antibiotics  help treat a bacterial infection.     Take your medicine as directed.  Contact your healthcare provider if you think your medicine is not helping or if you have side  effects. Tell your provider if you are allergic to any medicine. Keep a list of the medicines, vitamins, and herbs you take. Include the amounts, and when and why you take them. Bring the list or the pill bottles to follow-up visits. Carry your medicine list with you in case of an emergency.     Care for your wound as directed:  Keep your wound clean and dry. You may need to cover your wound when you bathe so it does not get wet. Clean your wound as directed with soap and water or wound . Put on new, clean bandages as directed. Change your bandages when they get wet or dirty.  Help your wound heal:   Eat a variety of healthy foods.  Examples include fruits, vegetables, whole-grain breads, low-fat dairy products, beans, lean meats, and fish. Healthy foods may help you heal faster. You may also need to take vitamins and minerals. Ask if you need to be on a special diet.          Manage other health conditions.  Follow your provider's directions to manage health conditions that can cause slow wound healing. Examples include high blood pressure and diabetes.     Do not smoke.  Nicotine and other chemicals in cigarettes and cigars can cause slow wound healing. Ask your provider for information if you currently smoke and need help to quit. E-cigarettes or smokeless tobacco still contain nicotine. Talk to your provider before you use these products.     Follow up with your doctor in 1 to 2 days:  Write down your questions so you remember to ask them during your visits.  © Copyright [employer] 2023 Information is for End User's use only and may not be sold, redistributed or otherwise used for commercial purposes.  The above information is an  only. It is not intended as medical advice for individual conditions or treatments. Talk to your doctor, nurse or pharmacist before following any medical regimen to see if it is safe and effective for you.       Follow up with PCP in 3-5 days.  Proceed to  ER if  symptoms worsen.    Chief Complaint     Chief Complaint   Patient presents with    Laceration     Laceration on right leg it's been 5 days. But now is red and swollen and infected . Patient need a tetanus shot.  .          History of Present Illness       Laceration   The incident occurred 5 to 7 days ago (x 5 days ago). The laceration is located on the Right leg. The laceration is 7 cm in size. The laceration mechanism was a metal edge and razor (). The patient is experiencing no pain. He reports no foreign bodies present. His tetanus status is out of date.       Review of Systems   Review of Systems   Constitutional:  Negative for fatigue and fever.   HENT:  Negative for congestion, ear discharge, ear pain, postnasal drip, rhinorrhea, sinus pressure, sinus pain, sneezing and sore throat.    Eyes: Negative.  Negative for pain, discharge, redness and itching.   Respiratory: Negative.  Negative for apnea, cough, choking, chest tightness, shortness of breath, wheezing and stridor.    Cardiovascular: Negative.  Negative for chest pain and palpitations.   Gastrointestinal: Negative.  Negative for diarrhea, nausea and vomiting.   Endocrine: Negative.  Negative for polydipsia, polyphagia and polyuria.   Genitourinary: Negative.  Negative for decreased urine volume and flank pain.   Musculoskeletal: Negative.  Negative for arthralgias, back pain, myalgias, neck pain and neck stiffness.   Skin:  Positive for wound. Negative for color change, pallor and rash.   Allergic/Immunologic: Negative.  Negative for environmental allergies.   Neurological: Negative.  Negative for dizziness, facial asymmetry, light-headedness, numbness and headaches.   Hematological: Negative.  Negative for adenopathy.   Psychiatric/Behavioral: Negative.           Current Medications       Current Outpatient Medications:     cephalexin (KEFLEX) 500 mg capsule, Take 1 capsule (500 mg total) by mouth every 6 (six) hours for 7 days, Disp: 28  capsule, Rfl: 0    predniSONE 10 mg tablet, Take 4 tablets (40 mg total) by mouth daily for 7 days, THEN 3 tablets (30 mg total) daily for 7 days, THEN 2 tablets (20 mg total) daily for 7 days, THEN 1 tablet (10 mg total) daily for 7 days., Disp: 70 tablet, Rfl: 0    acyclovir (ZOVIRAX) 400 MG tablet, Take 1 tablet (400 mg total) by mouth 2 (two) times a day for 420 doses, Disp: 60 tablet, Rfl: 6    dupilumab (DUPIXENT) subcutaneous injection, , Disp: , Rfl:     InFLIXimab (REMICADE IV), Infuse 10 mL/kg into a venous catheter., Disp: , Rfl:     sertraline (ZOLOFT) 25 mg tablet, Take 4 tablets (100 mg total) by mouth daily, Disp: 30 tablet, Rfl: 5    sildenafil (VIAGRA) 50 MG tablet, Take 1 tablet (50 mg total) by mouth as needed for erectile dysfunction, Disp: 10 tablet, Rfl: 4    Current Allergies     Allergies as of 04/23/2024 - Reviewed 04/23/2024   Allergen Reaction Noted    Shellfish-derived products - food allergy GI Intolerance 01/18/2016            The following portions of the patient's history were reviewed and updated as appropriate: allergies, current medications, past family history, past medical history, past social history, past surgical history and problem list.     Past Medical History:   Diagnosis Date    Allergic     Arthritis     Crohn's disease (HCC)     Eczema     Rheumatoid arthritis (HCC)        Past Surgical History:   Procedure Laterality Date    COLONOSCOPY N/A 1/18/2016    Procedure: COLONOSCOPY;  Surgeon: Raghu Patel MD;  Location: [facility];  Service:        Family History   Problem Relation Age of Onset    Dementia Father     Dementia Paternal Grandmother     Bipolar disorder Paternal Grandmother          Medications have been verified.        Objective   /86   Pulse 76   Temp (!) 97.3 °F (36.3 °C) (Temporal)   Resp 20   SpO2 98%        Physical Exam     Physical Exam  Vitals reviewed.   Constitutional:       General: He is not in acute distress.     Appearance: Normal  appearance. He is not ill-appearing, toxic-appearing or diaphoretic.      Interventions: He is not intubated.  HENT:      Head: Normocephalic and atraumatic.      Right Ear: Tympanic membrane, ear canal and external ear normal. There is no impacted cerumen.      Left Ear: Tympanic membrane, ear canal and external ear normal. There is no impacted cerumen.      Nose: Nose normal. No congestion or rhinorrhea.      Mouth/Throat:      Mouth: Mucous membranes are moist.      Pharynx: Oropharynx is clear. Uvula midline. No pharyngeal swelling, oropharyngeal exudate, posterior oropharyngeal erythema or uvula swelling.      Tonsils: No tonsillar exudate or tonsillar abscesses. 1+ on the right. 1+ on the left.   Eyes:      Extraocular Movements: Extraocular movements intact.      Conjunctiva/sclera: Conjunctivae normal.      Pupils: Pupils are equal, round, and reactive to light.   Cardiovascular:      Rate and Rhythm: Normal rate and regular rhythm.      Pulses: Normal pulses.      Heart sounds: Normal heart sounds, S1 normal and S2 normal. Heart sounds not distant. No murmur heard.     No friction rub. No gallop.   Pulmonary:      Effort: Pulmonary effort is normal. No tachypnea, bradypnea, accessory muscle usage, prolonged expiration, respiratory distress or retractions. He is not intubated.      Breath sounds: Normal breath sounds. No stridor, decreased air movement or transmitted upper airway sounds. No decreased breath sounds, wheezing, rhonchi or rales.   Chest:      Chest wall: No tenderness.   Musculoskeletal:         General: Normal range of motion.      Cervical back: Normal range of motion and neck supple. No rigidity or tenderness.   Lymphadenopathy:      Cervical: No cervical adenopathy.   Skin:     General: Skin is warm and dry.      Capillary Refill: Capillary refill takes less than 2 seconds.      Findings: Laceration present. No erythema.             Comments: Approximately 7 cm horizontal laceration of  right lower leg (medial aspect).   (+) Localized erythema, no drainage or swelling.    Neurological:      General: No focal deficit present.      Mental Status: He is alert.   Psychiatric:         Mood and Affect: Mood normal.

## 2024-05-19 ENCOUNTER — APPOINTMENT (OUTPATIENT)
Dept: LAB | Facility: CLINIC | Age: 44
End: 2024-05-19
Payer: COMMERCIAL

## 2024-05-19 DIAGNOSIS — K50.90 CROHN'S DISEASE WITHOUT COMPLICATION, UNSPECIFIED GASTROINTESTINAL TRACT LOCATION (HCC): ICD-10-CM

## 2024-05-19 DIAGNOSIS — Z13.220 SCREENING FOR HYPERLIPIDEMIA: ICD-10-CM

## 2024-05-19 DIAGNOSIS — D84.9 IMMUNOCOMPROMISED PATIENT (HCC): ICD-10-CM

## 2024-05-19 DIAGNOSIS — Z13.1 SCREENING FOR DIABETES MELLITUS: ICD-10-CM

## 2024-05-19 LAB
ALBUMIN SERPL BCP-MCNC: 4 G/DL (ref 3.5–5)
ALP SERPL-CCNC: 55 U/L (ref 34–104)
ALT SERPL W P-5'-P-CCNC: 33 U/L (ref 7–52)
ANION GAP SERPL CALCULATED.3IONS-SCNC: 11 MMOL/L (ref 4–13)
AST SERPL W P-5'-P-CCNC: 23 U/L (ref 13–39)
BILIRUB SERPL-MCNC: 1.22 MG/DL (ref 0.2–1)
BUN SERPL-MCNC: 16 MG/DL (ref 5–25)
CALCIUM SERPL-MCNC: 8.6 MG/DL (ref 8.4–10.2)
CHLORIDE SERPL-SCNC: 101 MMOL/L (ref 96–108)
CHOLEST SERPL-MCNC: 228 MG/DL
CO2 SERPL-SCNC: 25 MMOL/L (ref 21–32)
CREAT SERPL-MCNC: 1.09 MG/DL (ref 0.6–1.3)
GFR SERPL CREATININE-BSD FRML MDRD: 82 ML/MIN/1.73SQ M
GLUCOSE P FAST SERPL-MCNC: 128 MG/DL (ref 65–99)
HDLC SERPL-MCNC: 46 MG/DL
LDLC SERPL CALC-MCNC: 125 MG/DL (ref 0–100)
NONHDLC SERPL-MCNC: 182 MG/DL
POTASSIUM SERPL-SCNC: 4 MMOL/L (ref 3.5–5.3)
PROT SERPL-MCNC: 7.1 G/DL (ref 6.4–8.4)
SODIUM SERPL-SCNC: 137 MMOL/L (ref 135–147)
TRIGL SERPL-MCNC: 286 MG/DL

## 2024-05-19 PROCEDURE — 80061 LIPID PANEL: CPT

## 2024-05-19 PROCEDURE — 80053 COMPREHEN METABOLIC PANEL: CPT

## 2024-05-19 PROCEDURE — 80230 DRUG ASSAY INFLIXIMAB: CPT

## 2024-05-19 PROCEDURE — 36415 COLL VENOUS BLD VENIPUNCTURE: CPT

## 2024-05-19 PROCEDURE — 82397 CHEMILUMINESCENT ASSAY: CPT

## 2024-05-20 ENCOUNTER — ANESTHESIA (OUTPATIENT)
Dept: ANESTHESIOLOGY | Facility: HOSPITAL | Age: 44
End: 2024-05-20

## 2024-05-20 ENCOUNTER — ANESTHESIA EVENT (OUTPATIENT)
Dept: ANESTHESIOLOGY | Facility: HOSPITAL | Age: 44
End: 2024-05-20

## 2024-05-21 ENCOUNTER — OFFICE VISIT (OUTPATIENT)
Dept: GASTROENTEROLOGY | Facility: CLINIC | Age: 44
End: 2024-05-21
Payer: COMMERCIAL

## 2024-05-21 VITALS
TEMPERATURE: 97.9 F | BODY MASS INDEX: 36.88 KG/M2 | DIASTOLIC BLOOD PRESSURE: 77 MMHG | WEIGHT: 235 LBS | OXYGEN SATURATION: 97 % | SYSTOLIC BLOOD PRESSURE: 111 MMHG | HEIGHT: 67 IN | HEART RATE: 71 BPM

## 2024-05-21 DIAGNOSIS — D84.9 IMMUNOCOMPROMISED PATIENT (HCC): ICD-10-CM

## 2024-05-21 DIAGNOSIS — R19.7 DIARRHEA, UNSPECIFIED TYPE: ICD-10-CM

## 2024-05-21 DIAGNOSIS — K50.90 CROHN'S DISEASE WITHOUT COMPLICATION, UNSPECIFIED GASTROINTESTINAL TRACT LOCATION (HCC): Primary | ICD-10-CM

## 2024-05-21 PROCEDURE — 99214 OFFICE O/P EST MOD 30 MIN: CPT | Performed by: INTERNAL MEDICINE

## 2024-05-21 NOTE — PROGRESS NOTES
[facility] - Outpatient Follow-up Note  Simone Steiner 44 y.o. male MRN: [id number]  Encounter: [id number]          ASSESSMENT AND PLAN:    Simone Steiner is a 44 y.o. male with longstanding Crohn's diagnosed at age 21 on infliximab and overall doing well but with intermittent symptoms, with recent delay in infusion secondary to issues at his infusion center, who now presents for follow-up.  Also concern for possible IBS versus SIBO versus other pathology. Overall doing better now.     Colonoscopy June 2022 with scarring in the terminal ileum and nonobstructing stricture that appeared to be chronic in nature as well as erythema in the distal rectum and biopsies of the terminal ileum were benign and cecum with increased lamina propria inflammation but the remainder of the biopsies normal.    MR enterography August 2022 with changes of chronic inflammatory bowel disease involving 12 to 15 cm of the terminal ileum and probable nonobstructing stricturing but no active inflammation.    Recent CMP with elevated glucose and total bilirubin but normal electrolytes, creatinine, liver test otherwise.  Most recent CBC from several months ago with normal white blood cell count, hemoglobin, MCV, platelets.  CRP normal.    1. Crohn's disease without complication, unspecified gastrointestinal tract location (HCC)    2. Immunocompromised patient (HCC)    3. Diarrhea, unspecified type        Orders Placed This Encounter   Procedures    MRI enterography w wo    CBC and differential    Comprehensive metabolic panel    C-reactive protein    Vitamin D 25 hydroxy    Vitamin B12    Infliximab Concentration and Anti-Infliximab Antibody    Chronic Hepatitis Panel     Continue infliximab 10 mg/kg every 6 weeks  Repeat infliximab level pending    Awaiting colonoscopy  Awaiting MR enterography - will order today  Repeat blood work ordered today  Quant gold hepatitis panel November 2024    Avoid live virus  vaccines  Yearly flu shot  COVID vaccine and booster  Pneumonia vaccine  Shingrix  Routine skin exams with the dermatologist    ______________________________________________________________________    SUBJECTIVE:    Simone Steiner is a 44 y.o. male who presents with complaint of Crohn's.     Doing well unless he eats certain foods. Deven does not agree with him. He is careful with dairy and uses lactose free milk. He uses gluten free bread.   Now 2 Bms per day, formed, no blood. No abdominal pain.     Answers submitted by the patient for this visit:  Inflammatory Bowel Disease (Submitted on 5/16/2024)  Did the infection require hospitalization?: No  Did the infection require antibiotics?: Yes  In the past three months, have you used tobacco in any form?: No  During the last year, how many days have you missed work or school because of your inflammatory bowel disease?: 25  During the last year, how many days have you been hospitalized because of your inflammatory bowel disease?: 0  During the last year, how many days have you visited a hospital emergency department because of your inflammatory bowel disease?: 0  During the last month, have you taken narcotic pain medications (such as Percocet, oxycodone, Oxycontin, morphine, Vicodin, Dilaudid, MS Contin) for your inflammatory bowel disease?: No  Have you awoken at night because you needed to move your bowels during the last month? : No  Have you had leakage of stool while sleeping during the last month?: No  Have you had leakage of stool while you were awake during the last month?: No  In the last 6 months, have you unintentionally lost weight?: No  Fever: No  Eye irritation: No  Mouth sores: No  Sore throat: No  Chest pain: No  Shortness of breath: No  Numbness or tingling in your hands or feet: No  Skin rash: No  Pain or swelling in your joints: No  Bruising or bleeding: No  Felt depressed or blue: No  Inflammatory Bowel Disease (Submitted on 5/16/2024)  When  you are not experiencing symptoms of your inflammatory bowel disease, how many bowel movements do you typically have each day?: 3  What is the average (typical) number of bowel movements that you had in a single day during the last week?: 5  Over the last 3 days, have you had any bowel movements where you passed blood without stool?: No  Since your last visit, have you received any vaccinations?: No  Since the last visit, have you had an infection?: Yes      REVIEW OF SYSTEMS IS OTHERWISE NEGATIVE.  10 point ROS reviewed and negative, except as above      Historical Information   Past Medical History:   Diagnosis Date    Allergic     Arthritis     Crohn's disease (HCC)     Eczema     Rheumatoid arthritis (HCC)      Past Surgical History:   Procedure Laterality Date    COLONOSCOPY N/A 1/18/2016    Procedure: COLONOSCOPY;  Surgeon: Raghu Patel MD;  Location: [facility];  Service:      Social History   Social History     Substance and Sexual Activity   Alcohol Use Not Currently    Alcohol/week: 6.0 standard drinks of alcohol    Types: 3 Cans of beer, 3 Standard drinks or equivalent per week     Social History     Substance and Sexual Activity   Drug Use No     Social History     Tobacco Use   Smoking Status Never   Smokeless Tobacco Never     Family History   Problem Relation Age of Onset    Dementia Father     Dementia Paternal Grandmother     Bipolar disorder Paternal Grandmother        Meds/Allergies       Current Outpatient Medications:     acyclovir (ZOVIRAX) 400 MG tablet    dupilumab (DUPIXENT) subcutaneous injection    InFLIXimab (REMICADE IV)    sertraline (ZOLOFT) 25 mg tablet    sildenafil (VIAGRA) 50 MG tablet    Allergies   Allergen Reactions    Cat Hair Extract Other (See Comments)     Eye swelling and trouble breathing     Shellfish-Derived Products - Food Allergy GI Intolerance           Objective     Blood pressure 111/77, pulse 71, temperature 97.9 °F (36.6 °C), temperature source Tympanic, height 5'  "7\" (1.702 m), weight 107 kg (235 lb), SpO2 97%. Body mass index is 36.81 kg/m².    PHYSICAL EXAMINATION:    General Appearance:   Alert, cooperative, no distress   HEENT:  Normocephalic, atraumatic, anicteric. Neck supple, symmetrical, trachea midline.   Lungs:   Equal chest rise and unlabored breathing, normal effort, no coughing.   Cardiovascular:   No visualized JVD.   Abdomen:   No abdominal distension.   Skin:   No jaundice, rashes, or lesions.    Musculoskeletal:   Normal range of motion visualized.   Psych:  Normal affect and normal insight.   Neuro:  Alert and appropriate.         Lab Results:   No visits with results within 1 Day(s) from this visit.   Latest known visit with results is:   Appointment on 05/19/2024   Component Date Value    Sodium 05/19/2024 137     Potassium 05/19/2024 4.0     Chloride 05/19/2024 101     CO2 05/19/2024 25     ANION GAP 05/19/2024 11     BUN 05/19/2024 16     Creatinine 05/19/2024 1.09     Glucose, Fasting 05/19/2024 128 (H)     Calcium 05/19/2024 8.6     AST 05/19/2024 23     ALT 05/19/2024 33     Alkaline Phosphatase 05/19/2024 55     Total Protein 05/19/2024 7.1     Albumin 05/19/2024 4.0     Total Bilirubin 05/19/2024 1.22 (H)     eGFR 05/19/2024 82     Cholesterol 05/19/2024 228 (H)     Triglycerides 05/19/2024 286 (H)     HDL, Direct 05/19/2024 46     LDL Calculated 05/19/2024 125 (H)     Non-HDL-Chol (CHOL-HDL) 05/19/2024 182        Lab Results   Component Value Date    WBC 8.21 11/07/2023    HGB 13.5 11/07/2023    HCT 41.1 11/07/2023    MCV 89 11/07/2023     11/07/2023       Lab Results   Component Value Date    SODIUM 137 05/19/2024    K 4.0 05/19/2024     05/19/2024    CO2 25 05/19/2024    AGAP 11 05/19/2024    BUN 16 05/19/2024    CREATININE 1.09 05/19/2024    GLUC 90 10/26/2023    GLUF 128 (H) 05/19/2024    CALCIUM 8.6 05/19/2024    AST 23 05/19/2024    ALT 33 05/19/2024    ALKPHOS 55 05/19/2024    TP 7.1 05/19/2024    TBILI 1.22 (H) 05/19/2024    " EGFR 82 05/19/2024       Lab Results   Component Value Date    CRP 1.6 11/07/2023       Lab Results   Component Value Date    TSH 4.86 (H) 04/16/2018       Lab Results   Component Value Date    IRON 92 01/31/2020    TIBC 271 01/31/2020    FERRITIN 227 01/31/2020       Radiology Results:   No results found.

## 2024-05-21 NOTE — H&P (VIEW-ONLY)
[facility] - Outpatient Follow-up Note  Simone Steiner 44 y.o. male MRN: [id number]  Encounter: [id number]          ASSESSMENT AND PLAN:    Simone Steiner is a 44 y.o. male with longstanding Crohn's diagnosed at age 21 on infliximab and overall doing well but with intermittent symptoms, with recent delay in infusion secondary to issues at his infusion center, who now presents for follow-up.  Also concern for possible IBS versus SIBO versus other pathology. Overall doing better now.     Colonoscopy June 2022 with scarring in the terminal ileum and nonobstructing stricture that appeared to be chronic in nature as well as erythema in the distal rectum and biopsies of the terminal ileum were benign and cecum with increased lamina propria inflammation but the remainder of the biopsies normal.    MR enterography August 2022 with changes of chronic inflammatory bowel disease involving 12 to 15 cm of the terminal ileum and probable nonobstructing stricturing but no active inflammation.    Recent CMP with elevated glucose and total bilirubin but normal electrolytes, creatinine, liver test otherwise.  Most recent CBC from several months ago with normal white blood cell count, hemoglobin, MCV, platelets.  CRP normal.    1. Crohn's disease without complication, unspecified gastrointestinal tract location (HCC)    2. Immunocompromised patient (HCC)    3. Diarrhea, unspecified type        Orders Placed This Encounter   Procedures    MRI enterography w wo    CBC and differential    Comprehensive metabolic panel    C-reactive protein    Vitamin D 25 hydroxy    Vitamin B12    Infliximab Concentration and Anti-Infliximab Antibody    Chronic Hepatitis Panel     Continue infliximab 10 mg/kg every 6 weeks  Repeat infliximab level pending    Awaiting colonoscopy  Awaiting MR enterography - will order today  Repeat blood work ordered today  Quant gold hepatitis panel November 2024    Avoid live virus  vaccines  Yearly flu shot  COVID vaccine and booster  Pneumonia vaccine  Shingrix  Routine skin exams with the dermatologist    ______________________________________________________________________    SUBJECTIVE:    Simone Steiner is a 44 y.o. male who presents with complaint of Crohn's.     Doing well unless he eats certain foods. Deven does not agree with him. He is careful with dairy and uses lactose free milk. He uses gluten free bread.   Now 2 Bms per day, formed, no blood. No abdominal pain.     Answers submitted by the patient for this visit:  Inflammatory Bowel Disease (Submitted on 5/16/2024)  Did the infection require hospitalization?: No  Did the infection require antibiotics?: Yes  In the past three months, have you used tobacco in any form?: No  During the last year, how many days have you missed work or school because of your inflammatory bowel disease?: 25  During the last year, how many days have you been hospitalized because of your inflammatory bowel disease?: 0  During the last year, how many days have you visited a hospital emergency department because of your inflammatory bowel disease?: 0  During the last month, have you taken narcotic pain medications (such as Percocet, oxycodone, Oxycontin, morphine, Vicodin, Dilaudid, MS Contin) for your inflammatory bowel disease?: No  Have you awoken at night because you needed to move your bowels during the last month? : No  Have you had leakage of stool while sleeping during the last month?: No  Have you had leakage of stool while you were awake during the last month?: No  In the last 6 months, have you unintentionally lost weight?: No  Fever: No  Eye irritation: No  Mouth sores: No  Sore throat: No  Chest pain: No  Shortness of breath: No  Numbness or tingling in your hands or feet: No  Skin rash: No  Pain or swelling in your joints: No  Bruising or bleeding: No  Felt depressed or blue: No  Inflammatory Bowel Disease (Submitted on 5/16/2024)  When  you are not experiencing symptoms of your inflammatory bowel disease, how many bowel movements do you typically have each day?: 3  What is the average (typical) number of bowel movements that you had in a single day during the last week?: 5  Over the last 3 days, have you had any bowel movements where you passed blood without stool?: No  Since your last visit, have you received any vaccinations?: No  Since the last visit, have you had an infection?: Yes      REVIEW OF SYSTEMS IS OTHERWISE NEGATIVE.  10 point ROS reviewed and negative, except as above      Historical Information   Past Medical History:   Diagnosis Date    Allergic     Arthritis     Crohn's disease (HCC)     Eczema     Rheumatoid arthritis (HCC)      Past Surgical History:   Procedure Laterality Date    COLONOSCOPY N/A 1/18/2016    Procedure: COLONOSCOPY;  Surgeon: Raghu Patel MD;  Location: [facility];  Service:      Social History   Social History     Substance and Sexual Activity   Alcohol Use Not Currently    Alcohol/week: 6.0 standard drinks of alcohol    Types: 3 Cans of beer, 3 Standard drinks or equivalent per week     Social History     Substance and Sexual Activity   Drug Use No     Social History     Tobacco Use   Smoking Status Never   Smokeless Tobacco Never     Family History   Problem Relation Age of Onset    Dementia Father     Dementia Paternal Grandmother     Bipolar disorder Paternal Grandmother        Meds/Allergies       Current Outpatient Medications:     acyclovir (ZOVIRAX) 400 MG tablet    dupilumab (DUPIXENT) subcutaneous injection    InFLIXimab (REMICADE IV)    sertraline (ZOLOFT) 25 mg tablet    sildenafil (VIAGRA) 50 MG tablet    Allergies   Allergen Reactions    Cat Hair Extract Other (See Comments)     Eye swelling and trouble breathing     Shellfish-Derived Products - Food Allergy GI Intolerance           Objective     Blood pressure 111/77, pulse 71, temperature 97.9 °F (36.6 °C), temperature source Tympanic, height 5'  "7\" (1.702 m), weight 107 kg (235 lb), SpO2 97%. Body mass index is 36.81 kg/m².    PHYSICAL EXAMINATION:    General Appearance:   Alert, cooperative, no distress   HEENT:  Normocephalic, atraumatic, anicteric. Neck supple, symmetrical, trachea midline.   Lungs:   Equal chest rise and unlabored breathing, normal effort, no coughing.   Cardiovascular:   No visualized JVD.   Abdomen:   No abdominal distension.   Skin:   No jaundice, rashes, or lesions.    Musculoskeletal:   Normal range of motion visualized.   Psych:  Normal affect and normal insight.   Neuro:  Alert and appropriate.         Lab Results:   No visits with results within 1 Day(s) from this visit.   Latest known visit with results is:   Appointment on 05/19/2024   Component Date Value    Sodium 05/19/2024 137     Potassium 05/19/2024 4.0     Chloride 05/19/2024 101     CO2 05/19/2024 25     ANION GAP 05/19/2024 11     BUN 05/19/2024 16     Creatinine 05/19/2024 1.09     Glucose, Fasting 05/19/2024 128 (H)     Calcium 05/19/2024 8.6     AST 05/19/2024 23     ALT 05/19/2024 33     Alkaline Phosphatase 05/19/2024 55     Total Protein 05/19/2024 7.1     Albumin 05/19/2024 4.0     Total Bilirubin 05/19/2024 1.22 (H)     eGFR 05/19/2024 82     Cholesterol 05/19/2024 228 (H)     Triglycerides 05/19/2024 286 (H)     HDL, Direct 05/19/2024 46     LDL Calculated 05/19/2024 125 (H)     Non-HDL-Chol (CHOL-HDL) 05/19/2024 182        Lab Results   Component Value Date    WBC 8.21 11/07/2023    HGB 13.5 11/07/2023    HCT 41.1 11/07/2023    MCV 89 11/07/2023     11/07/2023       Lab Results   Component Value Date    SODIUM 137 05/19/2024    K 4.0 05/19/2024     05/19/2024    CO2 25 05/19/2024    AGAP 11 05/19/2024    BUN 16 05/19/2024    CREATININE 1.09 05/19/2024    GLUC 90 10/26/2023    GLUF 128 (H) 05/19/2024    CALCIUM 8.6 05/19/2024    AST 23 05/19/2024    ALT 33 05/19/2024    ALKPHOS 55 05/19/2024    TP 7.1 05/19/2024    TBILI 1.22 (H) 05/19/2024    " EGFR 82 05/19/2024       Lab Results   Component Value Date    CRP 1.6 11/07/2023       Lab Results   Component Value Date    TSH 4.86 (H) 04/16/2018       Lab Results   Component Value Date    IRON 92 01/31/2020    TIBC 271 01/31/2020    FERRITIN 227 01/31/2020       Radiology Results:   No results found.

## 2024-05-28 LAB
INFLIXIMAB AB SERPL-MCNC: <22 NG/ML
INFLIXIMAB SERPL-MCNC: <0.4 UG/ML

## 2024-05-29 ENCOUNTER — TELEPHONE (OUTPATIENT)
Dept: GASTROENTEROLOGY | Facility: CLINIC | Age: 44
End: 2024-05-29

## 2024-05-29 NOTE — TELEPHONE ENCOUNTER
Confirming Upcoming Procedure: colonoscopy on 06/04/24  Physician performing: Dr guillermo   [facility]   Prep: miralax     No question at the moment

## 2024-06-03 ENCOUNTER — ANESTHESIA EVENT (OUTPATIENT)
Dept: ANESTHESIOLOGY | Facility: HOSPITAL | Age: 44
End: 2024-06-03

## 2024-06-03 ENCOUNTER — ANESTHESIA (OUTPATIENT)
Dept: ANESTHESIOLOGY | Facility: HOSPITAL | Age: 44
End: 2024-06-03

## 2024-06-03 RX ORDER — SODIUM CHLORIDE 9 MG/ML
125 INJECTION, SOLUTION INTRAVENOUS CONTINUOUS
Status: CANCELLED | OUTPATIENT
Start: 2024-06-03

## 2024-06-03 NOTE — ANESTHESIA PREPROCEDURE EVALUATION
Procedure:  PRE-OP ONLY    Colonoscopy - Chron's     Chron's - remicaid     Relevant Problems   /RENAL   (+) Renal lesion      NEURO/PSYCH   (+) Episode of recurrent major depressive disorder (HCC)             Anesthesia Plan  ASA Score- 2     Anesthesia Type- IV sedation with anesthesia with ASA Monitors.         Additional Monitors:     Airway Plan:            Plan Factors-    Chart reviewed.   Existing labs reviewed. Patient summary reviewed.                  Induction-     Postoperative Plan-         Informed Consent-

## 2024-06-04 ENCOUNTER — HOSPITAL ENCOUNTER (OUTPATIENT)
Dept: GASTROENTEROLOGY | Facility: MEDICAL CENTER | Age: 44
Setting detail: OUTPATIENT SURGERY
Discharge: HOME | End: 2024-06-04
Attending: INTERNAL MEDICINE | Admitting: INTERNAL MEDICINE
Payer: COMMERCIAL

## 2024-06-04 ENCOUNTER — ANESTHESIA (OUTPATIENT)
Dept: GASTROENTEROLOGY | Facility: MEDICAL CENTER | Age: 44
End: 2024-06-04

## 2024-06-04 ENCOUNTER — ANESTHESIA EVENT (OUTPATIENT)
Dept: GASTROENTEROLOGY | Facility: MEDICAL CENTER | Age: 44
End: 2024-06-04

## 2024-06-04 VITALS
SYSTOLIC BLOOD PRESSURE: 103 MMHG | RESPIRATION RATE: 14 BRPM | HEIGHT: 67 IN | WEIGHT: 229 LBS | HEART RATE: 68 BPM | OXYGEN SATURATION: 96 % | DIASTOLIC BLOOD PRESSURE: 63 MMHG | TEMPERATURE: 97.5 F | BODY MASS INDEX: 35.94 KG/M2

## 2024-06-04 DIAGNOSIS — K50.90 CROHN'S DISEASE WITHOUT COMPLICATION, UNSPECIFIED GASTROINTESTINAL TRACT LOCATION (HCC): ICD-10-CM

## 2024-06-04 PROCEDURE — 45380 COLONOSCOPY AND BIOPSY: CPT | Performed by: INTERNAL MEDICINE

## 2024-06-04 PROCEDURE — 88305 TISSUE EXAM BY PATHOLOGIST: CPT | Performed by: STUDENT IN AN ORGANIZED HEALTH CARE EDUCATION/TRAINING PROGRAM

## 2024-06-04 RX ORDER — PROPOFOL 10 MG/ML
INJECTION, EMULSION INTRAVENOUS AS NEEDED
Status: DISCONTINUED | OUTPATIENT
Start: 2024-06-04 | End: 2024-06-04

## 2024-06-04 RX ORDER — SODIUM CHLORIDE 9 MG/ML
125 INJECTION, SOLUTION INTRAVENOUS CONTINUOUS
Status: DISCONTINUED | OUTPATIENT
Start: 2024-06-04 | End: 2024-06-08 | Stop reason: HOSPADM

## 2024-06-04 RX ORDER — LIDOCAINE HYDROCHLORIDE 20 MG/ML
INJECTION, SOLUTION EPIDURAL; INFILTRATION; INTRACAUDAL; PERINEURAL AS NEEDED
Status: DISCONTINUED | OUTPATIENT
Start: 2024-06-04 | End: 2024-06-04

## 2024-06-04 RX ADMIN — PROPOFOL 100 MCG/KG/MIN: 10 INJECTION, EMULSION INTRAVENOUS at 13:19

## 2024-06-04 RX ADMIN — SODIUM CHLORIDE 125 ML/HR: 0.9 INJECTION, SOLUTION INTRAVENOUS at 13:09

## 2024-06-04 RX ADMIN — PROPOFOL 150 MG: 10 INJECTION, EMULSION INTRAVENOUS at 13:18

## 2024-06-04 RX ADMIN — LIDOCAINE HYDROCHLORIDE 100 MG: 20 INJECTION, SOLUTION EPIDURAL; INFILTRATION; INTRACAUDAL at 13:18

## 2024-06-04 NOTE — ANESTHESIA POSTPROCEDURE EVALUATION
Post-Op Assessment Note    CV Status:  Stable  Pain Score: 0    Pain management: adequate       Mental Status:  Sleepy and arousable   Hydration Status:  Euvolemic   PONV Controlled:  Controlled   Airway Patency:  Patent  Two or more mitigation strategies used for obstructive sleep apnea   Post Op Vitals Reviewed: Yes    No anethesia notable event occurred.    Staff: Anesthesiologist               BP   123/77   Temp NA   Pulse 74   Resp 16   SpO2 96%

## 2024-06-04 NOTE — ANESTHESIA PREPROCEDURE EVALUATION
Procedure:  COLONOSCOPY  Colonoscopy - Chron's      Chron's - remicaid    Denies the following: CP/SOB with exertion, asthma, COPD, ARON, stroke/TIA, seizure    Relevant Problems   /RENAL   (+) Renal lesion      NEURO/PSYCH   (+) Episode of recurrent major depressive disorder (HCC)        Physical Exam    Airway    Mallampati score: II  TM Distance: >3 FB  Neck ROM: full     Dental   No notable dental hx     Cardiovascular      Pulmonary      Other Findings        Anesthesia Plan  ASA Score- 2     Anesthesia Type- IV sedation with anesthesia with ASA Monitors.         Additional Monitors:     Airway Plan:            Plan Factors-Exercise tolerance (METS): >4 METS.    Chart reviewed.   Existing labs reviewed. Patient summary reviewed.    Patient is not a current smoker.      Obstructive sleep apnea risk education given perioperatively.        Induction-     Postoperative Plan-         Informed Consent- Anesthetic plan and risks discussed with patient.  I personally reviewed this patient with the CRNA. Discussed and agreed on the Anesthesia Plan with the CRNA..

## 2024-06-07 PROCEDURE — 88305 TISSUE EXAM BY PATHOLOGIST: CPT | Performed by: STUDENT IN AN ORGANIZED HEALTH CARE EDUCATION/TRAINING PROGRAM

## 2024-07-01 ENCOUNTER — OFFICE VISIT (OUTPATIENT)
Dept: INTERNAL MEDICINE CLINIC | Facility: CLINIC | Age: 44
End: 2024-07-01
Payer: COMMERCIAL

## 2024-07-01 VITALS
HEIGHT: 67 IN | DIASTOLIC BLOOD PRESSURE: 74 MMHG | WEIGHT: 238 LBS | OXYGEN SATURATION: 97 % | HEART RATE: 80 BPM | BODY MASS INDEX: 37.35 KG/M2 | SYSTOLIC BLOOD PRESSURE: 120 MMHG | RESPIRATION RATE: 17 BRPM

## 2024-07-01 DIAGNOSIS — F33.9 EPISODE OF RECURRENT MAJOR DEPRESSIVE DISORDER, UNSPECIFIED DEPRESSION EPISODE SEVERITY (HCC): ICD-10-CM

## 2024-07-01 DIAGNOSIS — E78.1 HYPERTRIGLYCERIDEMIA: ICD-10-CM

## 2024-07-01 DIAGNOSIS — E78.5 HYPERLIPIDEMIA, UNSPECIFIED HYPERLIPIDEMIA TYPE: ICD-10-CM

## 2024-07-01 DIAGNOSIS — K50.10 CROHN'S DISEASE OF LARGE INTESTINE WITHOUT COMPLICATION (HCC): ICD-10-CM

## 2024-07-01 DIAGNOSIS — R73.09 ABNORMAL GLUCOSE: Primary | ICD-10-CM

## 2024-07-01 PROCEDURE — 99396 PREV VISIT EST AGE 40-64: CPT | Performed by: INTERNAL MEDICINE

## 2024-07-01 PROCEDURE — 99214 OFFICE O/P EST MOD 30 MIN: CPT | Performed by: INTERNAL MEDICINE

## 2024-07-01 NOTE — PROGRESS NOTES
Ambulatory Visit  Name: Simone Steiner      : 1980      MRN: [id number]  Encounter Provider: Elías Silva MD  Encounter Date: 2024   Encounter department: [facility]    Assessment & Plan   1. Hyperlipidemia, unspecified hyperlipidemia type  Assessment & Plan:  Lipid profile has been reviewed with the patient shows elevation recommend dietary adjustments to reduce consumption of saturated fats regular exercise and weight reduction also recommended follow-up in 6 months  Orders:  -     Lipid panel; Future; Expected date: 2025  2. Abnormal glucose  Assessment & Plan:  Elevated fasting blood sugar reviewed with the patient recommend decreasing consumption of carbohydrates and concentrated sugars weight reduction exercise and follow-up in 6 months  Orders:  -     Comprehensive metabolic panel; Future; Expected date: 2025  -     Hemoglobin A1C; Future  3. Episode of recurrent major depressive disorder, unspecified depression episode severity (HCC)  Assessment & Plan:  Depression symptoms remain well-controlled on sertraline 100 mg daily continue current dosing advised  4. Crohn's disease of large intestine without complication (HCC)  Assessment & Plan:  Crohn's disease stable I have reviewed patient's most recent GI note as well as colonoscopy continue Remicade for management of his Crohn's disease  5. Hypertriglyceridemia  Assessment & Plan:  Elevated triglyceride readings reviewed with patient recommend decrease concentrated sugar and carbohydrate consumption exercise weight reduction and follow-up in 6 months         History of Present Illness     This 44-year-old gentleman presents today for an annual physical examination.  He indicates that he has been feeling well has no recent episodes of flareup of his colitis.  He continues on Remicade for management of his colitis he is followed by [facility].  Most recent colonoscopy was several weeks  ago with normal-appearing biopsies.      Review of Systems   All other systems reviewed and are negative.    Past Medical History:   Diagnosis Date    Allergic     Arthritis     Crohn's disease (HCC)     Eczema     Rheumatoid arthritis (HCC)      Past Surgical History:   Procedure Laterality Date    COLONOSCOPY N/A 1/18/2016    Procedure: COLONOSCOPY;  Surgeon: Raghu Patel MD;  Location: [facility];  Service:      Family History   Problem Relation Age of Onset    Dementia Father     Dementia Paternal Grandmother     Bipolar disorder Paternal Grandmother      Social History     Tobacco Use    Smoking status: Never    Smokeless tobacco: Never   Vaping Use    Vaping status: Never Used   Substance and Sexual Activity    Alcohol use: Not Currently     Alcohol/week: 6.0 standard drinks of alcohol     Types: 3 Cans of beer, 3 Standard drinks or equivalent per week    Drug use: No    Sexual activity: Not Currently     Partners: Female     Birth control/protection: Condom Male, Female Sterilization     Current Outpatient Medications on File Prior to Visit   Medication Sig    acyclovir (ZOVIRAX) 400 MG tablet Take 1 tablet (400 mg total) by mouth 2 (two) times a day for 420 doses    dupilumab (DUPIXENT) subcutaneous injection     InFLIXimab (REMICADE IV) Infuse 10 mL/kg into a venous catheter.    sertraline (ZOLOFT) 25 mg tablet Take 4 tablets (100 mg total) by mouth daily    sildenafil (VIAGRA) 50 MG tablet Take 1 tablet (50 mg total) by mouth as needed for erectile dysfunction     Allergies   Allergen Reactions    Cat Hair Extract Other (See Comments)     Eye swelling and trouble breathing     Shellfish-Derived Products - Food Allergy GI Intolerance     Immunization History   Administered Date(s) Administered    COVID-19 MODERNA VACC 0.5 ML IM 03/30/2021, 04/30/2021    COVID-19 Moderna Vac BIVALENT 12 Yr+ IM 0.5 ML 10/23/2022    COVID-19 Pfizer mRNA vacc PF carmela-sucrose 12 yr and older (Comirnaty) 10/05/2023    INFLUENZA  "11/18/2021, 10/05/2023    Influenza, injectable, quadrivalent, preservative free 0.5 mL 11/02/2018, 09/09/2020    Influenza, seasonal, injectable 12/27/2011    Tdap 04/23/2024    Tuberculin Skin Test-PPD Intradermal 12/17/2012     Objective     /74   Pulse 80   Resp 17   Ht 5' 7\" (1.702 m)   Wt 108 kg (238 lb)   SpO2 97%   BMI 37.28 kg/m²     Physical Exam  Vitals and nursing note reviewed.   Constitutional:       General: He is not in acute distress.     Appearance: He is well-developed. He is not ill-appearing.   HENT:      Head: Normocephalic and atraumatic.      Right Ear: Tympanic membrane, ear canal and external ear normal.      Left Ear: Tympanic membrane, ear canal and external ear normal.      Nose: Nose normal.      Mouth/Throat:      Mouth: Mucous membranes are moist.      Pharynx: Oropharynx is clear.   Eyes:      Conjunctiva/sclera: Conjunctivae normal.   Cardiovascular:      Rate and Rhythm: Normal rate and regular rhythm.      Heart sounds: No murmur heard.  Pulmonary:      Effort: Pulmonary effort is normal. No respiratory distress.      Breath sounds: Normal breath sounds. No wheezing, rhonchi or rales.   Abdominal:      General: Abdomen is flat. Bowel sounds are normal. There is no distension.      Palpations: Abdomen is soft. There is no mass.      Tenderness: There is no abdominal tenderness. There is no guarding.   Musculoskeletal:         General: No swelling.      Cervical back: Normal range of motion and neck supple. No rigidity or tenderness.      Right lower leg: No edema.      Left lower leg: No edema.   Lymphadenopathy:      Cervical: No cervical adenopathy.   Skin:     General: Skin is warm and dry.      Capillary Refill: Capillary refill takes less than 2 seconds.      Coloration: Skin is not jaundiced or pale.   Neurological:      Mental Status: He is alert. Mental status is at baseline.   Psychiatric:         Mood and Affect: Mood normal.         Thought Content: Thought " content normal.         Judgment: Judgment normal.       Administrative Statements

## 2024-07-01 NOTE — ASSESSMENT & PLAN NOTE
Crohn's disease stable I have reviewed patient's most recent GI note as well as colonoscopy continue Remicade for management of his Crohn's disease

## 2024-07-01 NOTE — ASSESSMENT & PLAN NOTE
Depression symptoms remain well-controlled on sertraline 100 mg daily continue current dosing advised

## 2024-07-01 NOTE — ASSESSMENT & PLAN NOTE
Lipid profile has been reviewed with the patient shows elevation recommend dietary adjustments to reduce consumption of saturated fats regular exercise and weight reduction also recommended follow-up in 6 months

## 2024-07-01 NOTE — ASSESSMENT & PLAN NOTE
Elevated triglyceride readings reviewed with patient recommend decrease concentrated sugar and carbohydrate consumption exercise weight reduction and follow-up in 6 months

## 2024-07-01 NOTE — ASSESSMENT & PLAN NOTE
Elevated fasting blood sugar reviewed with the patient recommend decreasing consumption of carbohydrates and concentrated sugars weight reduction exercise and follow-up in 6 months

## 2024-07-10 ENCOUNTER — HOSPITAL ENCOUNTER (OUTPATIENT)
Dept: MRI IMAGING | Facility: HOSPITAL | Age: 44
Discharge: HOME | End: 2024-07-10
Attending: INTERNAL MEDICINE
Payer: COMMERCIAL

## 2024-07-10 DIAGNOSIS — K50.90 CROHN'S DISEASE WITHOUT COMPLICATION, UNSPECIFIED GASTROINTESTINAL TRACT LOCATION (HCC): ICD-10-CM

## 2024-07-10 DIAGNOSIS — D84.9 IMMUNOCOMPROMISED PATIENT (HCC): ICD-10-CM

## 2024-07-10 DIAGNOSIS — R19.7 DIARRHEA, UNSPECIFIED TYPE: ICD-10-CM

## 2024-07-10 PROCEDURE — 72197 MRI PELVIS W/O & W/DYE: CPT

## 2024-07-10 PROCEDURE — A9585 GADOBUTROL INJECTION: HCPCS | Performed by: INTERNAL MEDICINE

## 2024-07-10 PROCEDURE — 74183 MRI ABD W/O CNTR FLWD CNTR: CPT

## 2024-07-10 RX ORDER — GADOBUTROL 604.72 MG/ML
10 INJECTION INTRAVENOUS
Status: COMPLETED | OUTPATIENT
Start: 2024-07-10 | End: 2024-07-10

## 2024-07-10 RX ADMIN — GADOBUTROL 10 ML: 604.72 INJECTION INTRAVENOUS at 09:17

## 2024-07-10 RX ADMIN — GLUCAGON 1 MG: KIT at 09:13

## 2024-10-15 DIAGNOSIS — F33.9 EPISODE OF RECURRENT MAJOR DEPRESSIVE DISORDER, UNSPECIFIED DEPRESSION EPISODE SEVERITY (HCC): ICD-10-CM

## 2024-10-16 RX ORDER — SERTRALINE HYDROCHLORIDE 25 MG/1
100 TABLET, FILM COATED ORAL DAILY
Qty: 120 TABLET | Refills: 5 | Status: SHIPPED | OUTPATIENT
Start: 2024-10-16

## 2024-11-13 DIAGNOSIS — B00.9 HERPES SIMPLEX: ICD-10-CM

## 2024-11-13 RX ORDER — ACYCLOVIR 400 MG/1
TABLET ORAL
Qty: 60 TABLET | Refills: 6 | Status: SHIPPED | OUTPATIENT
Start: 2024-11-13 | End: 2025-05-13

## 2024-11-20 ENCOUNTER — RESULTS FOLLOW-UP (OUTPATIENT)
Age: 44
End: 2024-11-20

## 2024-11-20 ENCOUNTER — APPOINTMENT (OUTPATIENT)
Dept: LAB | Facility: CLINIC | Age: 44
End: 2024-11-20
Payer: COMMERCIAL

## 2024-11-20 DIAGNOSIS — R19.7 DIARRHEA, UNSPECIFIED TYPE: ICD-10-CM

## 2024-11-20 DIAGNOSIS — E55.9 VITAMIN D DEFICIENCY: Primary | ICD-10-CM

## 2024-11-20 DIAGNOSIS — D84.9 IMMUNOCOMPROMISED PATIENT (HCC): ICD-10-CM

## 2024-11-20 DIAGNOSIS — R73.09 ABNORMAL GLUCOSE: ICD-10-CM

## 2024-11-20 DIAGNOSIS — K50.90 CROHN'S DISEASE WITHOUT COMPLICATION, UNSPECIFIED GASTROINTESTINAL TRACT LOCATION (HCC): ICD-10-CM

## 2024-11-20 LAB
25(OH)D3 SERPL-MCNC: 12.5 NG/ML (ref 30–100)
ALBUMIN SERPL BCG-MCNC: 4.2 G/DL (ref 3.5–5)
ALP SERPL-CCNC: 72 U/L (ref 34–104)
ALT SERPL W P-5'-P-CCNC: 28 U/L (ref 7–52)
ANION GAP SERPL CALCULATED.3IONS-SCNC: 8 MMOL/L (ref 4–13)
AST SERPL W P-5'-P-CCNC: 21 U/L (ref 13–39)
BASOPHILS # BLD AUTO: 0.03 THOUSANDS/ÂΜL (ref 0–0.1)
BASOPHILS NFR BLD AUTO: 0 % (ref 0–1)
BILIRUB SERPL-MCNC: 1.13 MG/DL (ref 0.2–1)
BUN SERPL-MCNC: 15 MG/DL (ref 5–25)
CALCIUM SERPL-MCNC: 8.9 MG/DL (ref 8.4–10.2)
CHLORIDE SERPL-SCNC: 102 MMOL/L (ref 96–108)
CO2 SERPL-SCNC: 27 MMOL/L (ref 21–32)
CREAT SERPL-MCNC: 0.82 MG/DL (ref 0.6–1.3)
CRP SERPL QL: 5.3 MG/L
EOSINOPHIL # BLD AUTO: 0.06 THOUSAND/ÂΜL (ref 0–0.61)
EOSINOPHIL NFR BLD AUTO: 1 % (ref 0–6)
ERYTHROCYTE [DISTWIDTH] IN BLOOD BY AUTOMATED COUNT: 12.8 % (ref 11.6–15.1)
EST. AVERAGE GLUCOSE BLD GHB EST-MCNC: 123 MG/DL
GFR SERPL CREATININE-BSD FRML MDRD: 107 ML/MIN/1.73SQ M
GLUCOSE P FAST SERPL-MCNC: 91 MG/DL (ref 65–99)
HBA1C MFR BLD: 5.9 %
HCT VFR BLD AUTO: 42.1 % (ref 36.5–49.3)
HGB BLD-MCNC: 14.3 G/DL (ref 12–17)
IMM GRANULOCYTES # BLD AUTO: 0.05 THOUSAND/UL (ref 0–0.2)
IMM GRANULOCYTES NFR BLD AUTO: 1 % (ref 0–2)
LYMPHOCYTES # BLD AUTO: 2.12 THOUSANDS/ÂΜL (ref 0.6–4.47)
LYMPHOCYTES NFR BLD AUTO: 27 % (ref 14–44)
MCH RBC QN AUTO: 29.8 PG (ref 26.8–34.3)
MCHC RBC AUTO-ENTMCNC: 34 G/DL (ref 31.4–37.4)
MCV RBC AUTO: 88 FL (ref 82–98)
MONOCYTES # BLD AUTO: 0.65 THOUSAND/ÂΜL (ref 0.17–1.22)
MONOCYTES NFR BLD AUTO: 8 % (ref 4–12)
NEUTROPHILS # BLD AUTO: 5 THOUSANDS/ÂΜL (ref 1.85–7.62)
NEUTS SEG NFR BLD AUTO: 63 % (ref 43–75)
NRBC BLD AUTO-RTO: 0 /100 WBCS
PLATELET # BLD AUTO: 241 THOUSANDS/UL (ref 149–390)
PMV BLD AUTO: 10.3 FL (ref 8.9–12.7)
POTASSIUM SERPL-SCNC: 4.2 MMOL/L (ref 3.5–5.3)
PROT SERPL-MCNC: 7.2 G/DL (ref 6.4–8.4)
RBC # BLD AUTO: 4.8 MILLION/UL (ref 3.88–5.62)
SODIUM SERPL-SCNC: 137 MMOL/L (ref 135–147)
VIT B12 SERPL-MCNC: 269 PG/ML (ref 180–914)
WBC # BLD AUTO: 7.91 THOUSAND/UL (ref 4.31–10.16)

## 2024-11-20 PROCEDURE — 80230 DRUG ASSAY INFLIXIMAB: CPT

## 2024-11-20 PROCEDURE — 86704 HEP B CORE ANTIBODY TOTAL: CPT

## 2024-11-20 PROCEDURE — 83036 HEMOGLOBIN GLYCOSYLATED A1C: CPT

## 2024-11-20 PROCEDURE — 82306 VITAMIN D 25 HYDROXY: CPT

## 2024-11-20 PROCEDURE — 86803 HEPATITIS C AB TEST: CPT

## 2024-11-20 PROCEDURE — 86480 TB TEST CELL IMMUN MEASURE: CPT

## 2024-11-20 PROCEDURE — 36415 COLL VENOUS BLD VENIPUNCTURE: CPT

## 2024-11-20 PROCEDURE — 85025 COMPLETE CBC W/AUTO DIFF WBC: CPT

## 2024-11-20 PROCEDURE — 86705 HEP B CORE ANTIBODY IGM: CPT

## 2024-11-20 PROCEDURE — 82607 VITAMIN B-12: CPT

## 2024-11-20 PROCEDURE — 86140 C-REACTIVE PROTEIN: CPT

## 2024-11-20 PROCEDURE — 80053 COMPREHEN METABOLIC PANEL: CPT

## 2024-11-20 PROCEDURE — 82397 CHEMILUMINESCENT ASSAY: CPT

## 2024-11-20 PROCEDURE — 87340 HEPATITIS B SURFACE AG IA: CPT

## 2024-11-20 RX ORDER — ERGOCALCIFEROL 1.25 MG/1
50000 CAPSULE ORAL WEEKLY
Qty: 16 CAPSULE | Refills: 0 | Status: SHIPPED | OUTPATIENT
Start: 2024-11-20 | End: 2025-01-06

## 2024-11-21 LAB
GAMMA INTERFERON BACKGROUND BLD IA-ACNC: 0.04 IU/ML
HBV CORE AB SER QL: NORMAL
HBV CORE IGM SER QL: NORMAL
HBV SURFACE AG SER QL: NORMAL
HCV AB SER QL: NORMAL
M TB IFN-G BLD-IMP: NEGATIVE
M TB IFN-G CD4+ BCKGRND COR BLD-ACNC: -0.03 IU/ML
M TB IFN-G CD4+ BCKGRND COR BLD-ACNC: -0.03 IU/ML
MITOGEN IGNF BCKGRD COR BLD-ACNC: 9.96 IU/ML

## 2024-12-02 ENCOUNTER — OFFICE VISIT (OUTPATIENT)
Age: 44
End: 2024-12-02
Payer: COMMERCIAL

## 2024-12-02 VITALS
OXYGEN SATURATION: 97 % | BODY MASS INDEX: 37.1 KG/M2 | WEIGHT: 236.4 LBS | SYSTOLIC BLOOD PRESSURE: 104 MMHG | DIASTOLIC BLOOD PRESSURE: 70 MMHG | HEART RATE: 78 BPM | TEMPERATURE: 97.3 F | HEIGHT: 67 IN

## 2024-12-02 DIAGNOSIS — D84.9 IMMUNOCOMPROMISED PATIENT (HCC): ICD-10-CM

## 2024-12-02 DIAGNOSIS — E55.9 VITAMIN D DEFICIENCY: ICD-10-CM

## 2024-12-02 DIAGNOSIS — K50.90 CROHN'S DISEASE WITHOUT COMPLICATION, UNSPECIFIED GASTROINTESTINAL TRACT LOCATION (HCC): Primary | ICD-10-CM

## 2024-12-02 PROCEDURE — 99214 OFFICE O/P EST MOD 30 MIN: CPT | Performed by: INTERNAL MEDICINE

## 2024-12-02 NOTE — PROGRESS NOTES
Name: Simone Steiner      : 1980      MRN: [id number]  Encounter Provider: Catalina Hutchison MD  Encounter Date: 2024   Encounter department: [facility]  :  Assessment & Plan  Crohn's disease without complication, unspecified gastrointestinal tract location (HCC)  The patient is a 44-year-old male with longstanding Crohn's disease diagnosed at age 21 on infliximab and overall doing well but with intermittent symptoms but then delay in infusions secondary to issues with the infusion center, currently presenting for follow-up.  Overall doing well    Colonoscopy performed 2024 overall normal in appearance including terminal ileum.    MR enterography from 2024 with fecalization noted in the distal ileum/terminal ileum but no imaging signs of active inflammation or stricture.    Most recent CMP with elevated total bilirubin but otherwise normal.  Vitamin D low at 12.5.  B12 normal.  CBC normal.  Hemoglobin A1c mildly elevated at 5.9.  CRP mildly elevated at 5.3.  Infliximab level undetectable and no antidrug antibodies.    Continue infliximab 10 mg/kg every 6 weeks  Repeat infliximab level ordered     Repeat blood work in 6 months  Next Quant gold and hepatitis panel 2025.  Repeat colonoscopy spring/summer 2025    Orders:    Infliximab Concentration and Anti-Infliximab Antibody; Future    Immunocompromised patient (HCC)  Avoid live virus vaccines  Yearly flu shot  COVID vaccine and booster  Pneumonia vaccine  Shingrix  Routine skin exams with the dermatologist    Orders:    Infliximab Concentration and Anti-Infliximab Antibody; Future    Vitamin D deficiency  Vitamin D low at 12.5.    Continue vitamin D supplementation  Repeat vitamin D level with next set of labs.           History of Present Illness       Simone Steiner is a 44 y.o. male who presents with crohn's.     Mostly doing well. His diet has been hard to keep well. He has some sugary  foods and drinks. Otherwise he feels well. 2 formed stools per day. No blood or black stools. He gets some urgency 1-2 times per week. No abdominal pain.   No heartburn, dysphagia, odynophagia, nausea, vomiting. No rashes, joint pains, mouth sores. No weight loss. Appetite is good. Infusions going well.     Answers submitted by the patient for this visit:  Inflammatory Bowel Disease (Submitted on 12/2/2024)  When you are not experiencing symptoms of your inflammatory bowel disease, how many bowel movements do you typically have each day?: 3  What is the average (typical) number of bowel movements that you had in a single day during the last week?: 2  Over the last 3 days, have you had any bowel movements where you passed blood without stool?: No  Since your last visit, have you received any vaccinations?: Yes  Since the last visit, have you had an infection?: No  In the past three months, have you used tobacco in any form?: No  During the last year, how many days have you missed work or school because of your inflammatory bowel disease?: 15  During the last year, how many days have you been hospitalized because of your inflammatory bowel disease?: 0  During the last year, how many days have you visited a hospital emergency department because of your inflammatory bowel disease?: 0  During the last month, have you taken narcotic pain medications (such as Percocet, oxycodone, Oxycontin, morphine, Vicodin, Dilaudid, MS Contin) for your inflammatory bowel disease?: No  Have you awoken at night because you needed to move your bowels during the last month? : Yes  Have you had leakage of stool while sleeping during the last month?: No  Have you had leakage of stool while you were awake during the last month?: No  In the last 6 months, have you unintentionally lost weight?: No  Fever: No  Eye irritation: No  Numbness or tingling in your hands or feet: No  Bruising or bleeding: No  Felt depressed or blue: No        10 point  "ROS reviewed and negative, except as above         Objective   /70 (BP Location: Right arm, Patient Position: Sitting, Cuff Size: Large)   Pulse 78   Temp (!) 97.3 °F (36.3 °C) (Tympanic)   Ht 5' 7\" (1.702 m)   Wt 107 kg (236 lb 6.4 oz)   SpO2 97%   BMI 37.03 kg/m²      PHYSICAL EXAMINATION:    General Appearance:   Alert, cooperative, no distress   HEENT:  Normocephalic, atraumatic, anicteric. Neck supple, symmetrical, trachea midline.   Lungs:   Equal chest rise and unlabored breathing, normal effort, no coughing.   Cardiovascular:   No visualized JVD.   Abdomen:   No abdominal distension.   Skin:   No jaundice, rashes, or lesions.    Musculoskeletal:   Normal range of motion visualized.   Psych:  Normal affect and normal insight.   Neuro:  Alert and appropriate.             "

## 2024-12-02 NOTE — ASSESSMENT & PLAN NOTE
The patient is a 44-year-old male with longstanding Crohn's disease diagnosed at age 21 on infliximab and overall doing well but with intermittent symptoms but then delay in infusions secondary to issues with the infusion center, currently presenting for follow-up.  Overall doing well    Colonoscopy performed June 2024 overall normal in appearance including terminal ileum.    MR enterography from July 2024 with fecalization noted in the distal ileum/terminal ileum but no imaging signs of active inflammation or stricture.    Most recent CMP with elevated total bilirubin but otherwise normal.  Vitamin D low at 12.5.  B12 normal.  CBC normal.  Hemoglobin A1c mildly elevated at 5.9.  CRP mildly elevated at 5.3.  Infliximab level undetectable and no antidrug antibodies.    Continue infliximab 10 mg/kg every 6 weeks  Repeat infliximab level ordered     Repeat blood work in 6 months  Next Quant gold and hepatitis panel November 2025.  Repeat colonoscopy spring/summer 2025    Orders:    Infliximab Concentration and Anti-Infliximab Antibody; Future

## 2024-12-09 LAB
INFLIXIMAB AB SERPL-MCNC: <22 NG/ML
INFLIXIMAB SERPL-MCNC: 187 UG/ML

## 2025-01-06 ENCOUNTER — OFFICE VISIT (OUTPATIENT)
Age: 45
End: 2025-01-06
Payer: COMMERCIAL

## 2025-01-06 VITALS
HEART RATE: 84 BPM | BODY MASS INDEX: 36.88 KG/M2 | WEIGHT: 235 LBS | HEIGHT: 67 IN | DIASTOLIC BLOOD PRESSURE: 72 MMHG | OXYGEN SATURATION: 97 % | SYSTOLIC BLOOD PRESSURE: 114 MMHG

## 2025-01-06 DIAGNOSIS — F33.9 EPISODE OF RECURRENT MAJOR DEPRESSIVE DISORDER, UNSPECIFIED DEPRESSION EPISODE SEVERITY (HCC): ICD-10-CM

## 2025-01-06 DIAGNOSIS — J40 BRONCHITIS: Primary | ICD-10-CM

## 2025-01-06 DIAGNOSIS — Z13.220 SCREENING FOR HYPERLIPIDEMIA: ICD-10-CM

## 2025-01-06 DIAGNOSIS — Z12.5 SCREENING FOR PROSTATE CANCER: ICD-10-CM

## 2025-01-06 DIAGNOSIS — Z13.1 SCREENING FOR DIABETES MELLITUS: ICD-10-CM

## 2025-01-06 PROCEDURE — 99214 OFFICE O/P EST MOD 30 MIN: CPT | Performed by: INTERNAL MEDICINE

## 2025-01-06 RX ORDER — AZITHROMYCIN 250 MG/1
TABLET, FILM COATED ORAL
Qty: 6 TABLET | Refills: 0 | Status: SHIPPED | OUTPATIENT
Start: 2025-01-06 | End: 2025-01-11

## 2025-01-06 NOTE — ASSESSMENT & PLAN NOTE
Patient continues to get significant benefit from sertraline 100 mg daily.  Denies any side effects of the medication recommend continuation of current dosing

## 2025-01-06 NOTE — ASSESSMENT & PLAN NOTE
Current assessment of pulmonary status indicates few scattered rhonchi in both lungs given production of green mucus from his chest recommend initiation of a Z-Andres 5-day course follow-up if symptoms do not resolve or persist.    Orders:    azithromycin (Zithromax) 250 mg tablet; Take 2 tablets (500 mg total) by mouth daily for 1 day, THEN 1 tablet (250 mg total) daily for 4 days.

## 2025-01-06 NOTE — PROGRESS NOTES
Name: Simone Steiner      : 1980      MRN: [id number]  Encounter Provider: Elías Silva MD  Encounter Date: 2025   Encounter department: [facility]    Assessment & Plan  Bronchitis  Current assessment of pulmonary status indicates few scattered rhonchi in both lungs given production of green mucus from his chest recommend initiation of a Z-Andres 5-day course follow-up if symptoms do not resolve or persist.    Orders:    azithromycin (Zithromax) 250 mg tablet; Take 2 tablets (500 mg total) by mouth daily for 1 day, THEN 1 tablet (250 mg total) daily for 4 days.    Screening for hyperlipidemia    Orders:    Lipid panel; Future    Screening for diabetes mellitus    Orders:    Comprehensive metabolic panel; Future    Screening for prostate cancer    Orders:    PSA, Total Screen; Future    Episode of recurrent major depressive disorder, unspecified depression episode severity (HCC)  Patient continues to get significant benefit from sertraline 100 mg daily.  Denies any side effects of the medication recommend continuation of current dosing              History of Present Illness     This pleasant 44-year-old gentleman presents today for a routine assessment of his treatment for depression.  Indicates his sertraline medication has been working well with no complaints.    He relates today that over the past several days he has been experiencing a cough productive of green mucus.  Indicates that the sinuses throat and ears are asymptomatic at this time.      Review of Systems   Respiratory:  Positive for cough.    All other systems reviewed and are negative.    Past Medical History:   Diagnosis Date    Allergic     Arthritis     Crohn's disease (HCC)     Eczema     Rheumatoid arthritis (HCC)      Past Surgical History:   Procedure Laterality Date    COLONOSCOPY N/A 2016    Procedure: COLONOSCOPY;  Surgeon: Raghu Patel MD;  Location: [facility];  Service:      [facility]  "History   Problem Relation Age of Onset    Dementia Father     Dementia Paternal Grandmother     Bipolar disorder Paternal Grandmother      Social History     Tobacco Use    Smoking status: Never    Smokeless tobacco: Never   Vaping Use    Vaping status: Never Used   Substance and Sexual Activity    Alcohol use: Yes     Alcohol/week: 6.0 standard drinks of alcohol     Types: 3 Cans of beer, 3 Standard drinks or equivalent per week    Drug use: No    Sexual activity: Yes     Partners: Female     Birth control/protection: Condom Male     Current Outpatient Medications on File Prior to Visit   Medication Sig    acyclovir (ZOVIRAX) 400 MG tablet Take 1 pill twice a day    dupilumab (DUPIXENT) subcutaneous injection     InFLIXimab (REMICADE IV) Infuse 10 mL/kg into a venous catheter.    sertraline (ZOLOFT) 25 mg tablet TAKE 4 TABLETS BY MOUTH DAILY.    sildenafil (VIAGRA) 50 MG tablet Take 1 tablet (50 mg total) by mouth as needed for erectile dysfunction    [DISCONTINUED] ergocalciferol (ERGOCALCIFEROL) 1.25 MG (94587 UT) capsule Take 1 capsule (50,000 Units total) by mouth once a week for 16 doses     Allergies   Allergen Reactions    Cat Hair Extract Other (See Comments)     Eye swelling and trouble breathing     Shellfish-Derived Products - Food Allergy GI Intolerance     Immunization History   Administered Date(s) Administered    COVID-19 MODERNA VACC 0.5 ML IM 03/30/2021, 04/30/2021    COVID-19 Moderna Vac BIVALENT 12 Yr+ IM 0.5 ML 10/23/2022    COVID-19 Pfizer mRNA vacc PF carmela-sucrose 12 yr and older (Comirnaty) 10/05/2023, 09/10/2024    INFLUENZA 11/18/2021, 10/05/2023    Influenza, injectable, quadrivalent, preservative free 0.5 mL 11/02/2018, 09/09/2020    Influenza, seasonal, injectable 12/27/2011    Tdap 04/23/2024    Tuberculin Skin Test-PPD Intradermal 12/17/2012     Objective   /72   Pulse 84   Ht 5' 7\" (1.702 m)   Wt 107 kg (235 lb)   SpO2 97%   BMI 36.81 kg/m²     Physical Exam  Vitals and " nursing note reviewed.   Constitutional:       General: He is not in acute distress.     Appearance: He is well-developed.   HENT:      Head: Normocephalic and atraumatic.      Right Ear: Tympanic membrane, ear canal and external ear normal.      Left Ear: Tympanic membrane, ear canal and external ear normal.      Nose: Nose normal.      Mouth/Throat:      Mouth: Mucous membranes are moist.      Pharynx: Oropharynx is clear.   Eyes:      Conjunctiva/sclera: Conjunctivae normal.      Pupils: Pupils are equal, round, and reactive to light.   Neck:      Vascular: No carotid bruit.   Cardiovascular:      Rate and Rhythm: Normal rate and regular rhythm.      Heart sounds: Normal heart sounds. No murmur heard.  Pulmonary:      Effort: Pulmonary effort is normal. No respiratory distress.      Breath sounds: Rhonchi present. No wheezing or rales.   Abdominal:      Palpations: Abdomen is soft.   Musculoskeletal:      Cervical back: Normal range of motion and neck supple. No rigidity or tenderness.   Lymphadenopathy:      Cervical: No cervical adenopathy.   Skin:     General: Skin is warm and dry.      Capillary Refill: Capillary refill takes less than 2 seconds.   Neurological:      Mental Status: He is alert.   Psychiatric:         Mood and Affect: Mood normal.

## 2025-04-21 ENCOUNTER — OFFICE VISIT (OUTPATIENT)
Dept: URGENT CARE | Facility: CLINIC | Age: 45
End: 2025-04-21
Payer: COMMERCIAL

## 2025-04-21 VITALS
HEART RATE: 82 BPM | OXYGEN SATURATION: 99 % | TEMPERATURE: 98.7 F | RESPIRATION RATE: 18 BRPM | DIASTOLIC BLOOD PRESSURE: 74 MMHG | BODY MASS INDEX: 36.9 KG/M2 | SYSTOLIC BLOOD PRESSURE: 126 MMHG | WEIGHT: 235.6 LBS

## 2025-04-21 DIAGNOSIS — G47.09 OTHER INSOMNIA: Primary | ICD-10-CM

## 2025-04-21 PROCEDURE — S9083 URGENT CARE CENTER GLOBAL: HCPCS | Performed by: NURSE PRACTITIONER

## 2025-04-21 PROCEDURE — G0382 LEV 3 HOSP TYPE B ED VISIT: HCPCS | Performed by: NURSE PRACTITIONER

## 2025-04-21 NOTE — PROGRESS NOTES
[facility]        NAME: Simone Steiner is a 45 y.o. male  : 1980    MRN: [id number]  DATE: 2025  TIME: 6:29 PM    Assessment and Plan   Other insomnia [G47.09]  1. Other insomnia              Patient Instructions     Get melatonin OTC and use daily  Speak with family and try to schedule atleast 6-8 hrs of sleep daily  If no improvement in sleep quality with melatonin, f/u with PCP for other options   Follow up with PCP in 7 days.  Proceed to  ER if symptoms worsen.    If tests have been performed at [facility], our office will contact you with results if changes need to be made to the care plan discussed with you at the visit.  You can review your full results on [facility].    Chief Complaint     Chief Complaint   Patient presents with    Fatigue     Started 8 months ago after the birth of his daughter, reports symptoms worse over the past month, has not tried any OTC remedies but reports melatonin used to work for him, denies other symptoms         History of Present Illness       HPI  Reports fatigue, chronic. Says his has an 8 month old baby. Since birth, he has not been able to sleep well. He has difficulty falling asleep and also not having enough time to sleep well. He works first shift at a local [employer] and has to wake up at about 2:30 am to get ready for work. Closes at noon and then has to get home, and start assisting with caring for the baby. Wife also works. He has used melatonin in the past for sleep and it worked well for him. Last used it about 4 yrs ago. Has not tried it this time around.     Review of Systems   Review of Systems   Constitutional:  Positive for fatigue.   Respiratory:  Negative for shortness of breath.    Cardiovascular:  Negative for chest pain.   Gastrointestinal:  Negative for diarrhea and vomiting.   Psychiatric/Behavioral:  Positive for sleep disturbance. Negative for confusion and hallucinations.          Current Medications       Current  Outpatient Medications:     acyclovir (ZOVIRAX) 400 MG tablet, Take 1 pill twice a day, Disp: 60 tablet, Rfl: 6    dupilumab (DUPIXENT) subcutaneous injection, , Disp: , Rfl:     InFLIXimab (REMICADE IV), Infuse 10 mL/kg into a venous catheter., Disp: , Rfl:     sertraline (ZOLOFT) 25 mg tablet, TAKE 4 TABLETS BY MOUTH DAILY., Disp: 120 tablet, Rfl: 5    sildenafil (VIAGRA) 50 MG tablet, Take 1 tablet (50 mg total) by mouth as needed for erectile dysfunction, Disp: 10 tablet, Rfl: 4    Current Allergies     Allergies as of 04/21/2025 - Reviewed 04/21/2025   Allergen Reaction Noted    Cat dander Other (See Comments) 05/21/2024    Shellfish-derived products - food allergy GI Intolerance 01/18/2016            The following portions of the patient's history were reviewed and updated as appropriate: allergies, current medications, past family history, past medical history, past social history, past surgical history and problem list.     Past Medical History:   Diagnosis Date    Allergic     Arthritis     Crohn's disease (HCC)     Eczema     Rheumatoid arthritis (HCC)        Past Surgical History:   Procedure Laterality Date    COLONOSCOPY N/A 1/18/2016    Procedure: COLONOSCOPY;  Surgeon: Raghu Patel MD;  Location: [facility];  Service:        Family History   Problem Relation Age of Onset    Dementia Father     Dementia Paternal Grandmother     Bipolar disorder Paternal Grandmother          Medications have been verified.        Objective   /74 (BP Location: Right arm, Patient Position: Sitting)   Pulse 82   Temp 98.7 °F (37.1 °C) (Tympanic)   Resp 18   Wt 107 kg (235 lb 9.6 oz)   SpO2 99%   BMI 36.90 kg/m²   No LMP for male patient.       Physical Exam     Physical Exam  Constitutional:       General: He is not in acute distress.     Appearance: He is not ill-appearing.   Cardiovascular:      Rate and Rhythm: Regular rhythm.      Heart sounds: Normal heart sounds.   Pulmonary:      Effort: Pulmonary effort  is normal.      Breath sounds: Normal breath sounds.   Neurological:      General: No focal deficit present.      Mental Status: He is alert and oriented to person, place, and time.   Psychiatric:         Behavior: Behavior normal.         Thought Content: Thought content normal.

## 2025-04-21 NOTE — LETTER
April 21, 2025     Patient: Simone Steiner   YOB: 1980   Date of Visit: 4/21/2025       To Whom it May Concern:    Simone Steiner was seen in my clinic on 4/21/2025. He may return to work on 04/22/2025 .    If you have any questions or concerns, please don't hesitate to call.         Sincerely,          VIRI Brewster        CC: No Recipients

## 2025-06-04 ENCOUNTER — APPOINTMENT (OUTPATIENT)
Dept: LAB | Facility: CLINIC | Age: 45
End: 2025-06-04
Attending: INTERNAL MEDICINE
Payer: COMMERCIAL

## 2025-06-04 DIAGNOSIS — K50.90 CROHN'S DISEASE WITHOUT COMPLICATION, UNSPECIFIED GASTROINTESTINAL TRACT LOCATION (HCC): ICD-10-CM

## 2025-06-04 DIAGNOSIS — Z13.1 SCREENING FOR DIABETES MELLITUS: ICD-10-CM

## 2025-06-04 DIAGNOSIS — Z12.5 SCREENING FOR PROSTATE CANCER: ICD-10-CM

## 2025-06-04 DIAGNOSIS — Z13.220 SCREENING FOR HYPERLIPIDEMIA: ICD-10-CM

## 2025-06-04 DIAGNOSIS — D84.9 IMMUNOCOMPROMISED PATIENT (HCC): ICD-10-CM

## 2025-06-04 LAB
ALBUMIN SERPL BCG-MCNC: 4.3 G/DL (ref 3.5–5)
ALP SERPL-CCNC: 77 U/L (ref 34–104)
ALT SERPL W P-5'-P-CCNC: 41 U/L (ref 7–52)
ANION GAP SERPL CALCULATED.3IONS-SCNC: 10 MMOL/L (ref 4–13)
AST SERPL W P-5'-P-CCNC: 25 U/L (ref 13–39)
BILIRUB SERPL-MCNC: 1.12 MG/DL (ref 0.2–1)
BUN SERPL-MCNC: 16 MG/DL (ref 5–25)
CALCIUM SERPL-MCNC: 8.8 MG/DL (ref 8.4–10.2)
CHLORIDE SERPL-SCNC: 102 MMOL/L (ref 96–108)
CHOLEST SERPL-MCNC: 161 MG/DL (ref ?–200)
CO2 SERPL-SCNC: 27 MMOL/L (ref 21–32)
CREAT SERPL-MCNC: 0.75 MG/DL (ref 0.6–1.3)
GFR SERPL CREATININE-BSD FRML MDRD: 110 ML/MIN/1.73SQ M
GLUCOSE P FAST SERPL-MCNC: 91 MG/DL (ref 65–99)
HDLC SERPL-MCNC: 46 MG/DL
LDLC SERPL CALC-MCNC: 77 MG/DL (ref 0–100)
NONHDLC SERPL-MCNC: 115 MG/DL
POTASSIUM SERPL-SCNC: 4.2 MMOL/L (ref 3.5–5.3)
PROT SERPL-MCNC: 7.3 G/DL (ref 6.4–8.4)
PSA SERPL-MCNC: 0.26 NG/ML (ref 0–4)
SODIUM SERPL-SCNC: 139 MMOL/L (ref 135–147)
TRIGL SERPL-MCNC: 189 MG/DL (ref ?–150)

## 2025-06-04 PROCEDURE — 80061 LIPID PANEL: CPT

## 2025-06-04 PROCEDURE — 82397 CHEMILUMINESCENT ASSAY: CPT

## 2025-06-04 PROCEDURE — 80230 DRUG ASSAY INFLIXIMAB: CPT

## 2025-06-04 PROCEDURE — 80053 COMPREHEN METABOLIC PANEL: CPT

## 2025-06-04 PROCEDURE — G0103 PSA SCREENING: HCPCS

## 2025-06-04 PROCEDURE — 36415 COLL VENOUS BLD VENIPUNCTURE: CPT

## 2025-06-11 ENCOUNTER — OFFICE VISIT (OUTPATIENT)
Age: 45
End: 2025-06-11
Payer: COMMERCIAL

## 2025-06-11 VITALS
DIASTOLIC BLOOD PRESSURE: 72 MMHG | HEART RATE: 81 BPM | SYSTOLIC BLOOD PRESSURE: 120 MMHG | OXYGEN SATURATION: 98 % | BODY MASS INDEX: 37.04 KG/M2 | TEMPERATURE: 97.9 F | HEIGHT: 67 IN | WEIGHT: 236 LBS

## 2025-06-11 DIAGNOSIS — E55.9 VITAMIN D DEFICIENCY: ICD-10-CM

## 2025-06-11 DIAGNOSIS — D84.9 IMMUNOCOMPROMISED PATIENT (HCC): ICD-10-CM

## 2025-06-11 DIAGNOSIS — K50.90 CROHN'S DISEASE WITHOUT COMPLICATION, UNSPECIFIED GASTROINTESTINAL TRACT LOCATION (HCC): Primary | ICD-10-CM

## 2025-06-11 PROCEDURE — 99214 OFFICE O/P EST MOD 30 MIN: CPT | Performed by: DIETITIAN, REGISTERED

## 2025-06-11 NOTE — ASSESSMENT & PLAN NOTE
Longstanding history of Crohn's disease diagnosed at age 21, on infliximab 10 mg/kg every 6 weeks.  Patient reports he is feeling well overall.  He typically has 3 formed BMs daily with no abdominal pain, urgency, or nocturnal bowel movements.  He has intermittent fecal urgency and very rare episodes of incontinence, typically related to specific foods (such as spicy foods or lactose).  He has a history of eczema, but this is under good control with Dupixent.  He denies any canker sores or joint pains.  He denies any heartburn, nausea, vomiting.  Appetite is good and weight is stable.  He is currently taking turmeric once daily.  He has a 10 month old daughter at home!  He is up-to-date with vaccines including COVID booster, flu shot, hepatitis B vaccine, and Tdap.  He sees dermatology once annually, next scheduled in August.    Last colonoscopy 6/2024 with no active inflammation, consistent with endoscopic remission.    Labs 6/2025 reviewed.  CMP normal other than total bilirubin 1.12.  Lipid panel normal other than triglycerides 189.  Infliximab TDM in process.    Labs 11/2024 reviewed.  CBC normal.  CMP normal other than total bilirubin 1.13.  CRP mildly elevated, 5.3.  Vitamin D low, 12.5.  Vitamin B12 borderline low, 269.  Infliximab level 187 with no detectable antidrug antibodies.  QuantiFERON gold negative.  Chronic hepatitis panel nonreactive.  Orders:  •  CBC; Future  •  C-reactive protein; Future  •  Iron Panel (Includes Ferritin, Iron Sat%, Iron, and TIBC); Future  •  Vitamin B12; Future  •  Vitamin D 25 hydroxy; Future  •  pneumococcal 20-sanam conj vacc (PREVNAR 20) 0.5 ML ABIODUN; Inject 0.5 mL into a muscle once for 1 dose  -Continue infliximab 10 mg/kg every 6 weeks.  -Recommend avoiding/limiting dietary triggers as able.  -Follow-up recent infliximab TDM, currently in process.  Noted this lab was drawn about 1 to 2 weeks prior to his next infusion.  -Continue turmeric, recommend 1000 mg twice  daily.  -Check CBC, CRP.  -Update vitamin D, vitamin B12, and iron levels.  -Continue to follow-up with dermatology once yearly.  -Recommend routine vaccinations.  Also recommend Prevnar 20, sent to pharmacy.  -Follow-up in 6 months.

## 2025-06-11 NOTE — PROGRESS NOTES
Name: Simone Steiner      : 1980      MRN: [id number]  Encounter Provider: Francis Preston PA-C  Encounter Date: 2025   Encounter department: [facility]  :  Assessment & Plan  Crohn's disease without complication, unspecified gastrointestinal tract location (HCC)  Immunocompromised patient (HCC)  Longstanding history of Crohn's disease diagnosed at age 21, on infliximab 10 mg/kg every 6 weeks.  Patient reports he is feeling well overall.  He typically has 3 formed BMs daily with no abdominal pain, urgency, or nocturnal bowel movements.  He has intermittent fecal urgency and very rare episodes of incontinence, typically related to specific foods (such as spicy foods or lactose).  He has a history of eczema, but this is under good control with Dupixent.  He denies any canker sores or joint pains.  He denies any heartburn, nausea, vomiting.  Appetite is good and weight is stable.  He is currently taking turmeric once daily.  He has a 10 month old daughter at home!  He is up-to-date with vaccines including COVID booster, flu shot, hepatitis B vaccine, and Tdap.  He sees dermatology once annually, next scheduled in August.    Last colonoscopy 2024 with no active inflammation, consistent with endoscopic remission.    Labs 2025 reviewed.  CMP normal other than total bilirubin 1.12.  Lipid panel normal other than triglycerides 189.  Infliximab TDM in process.    Labs 2024 reviewed.  CBC normal.  CMP normal other than total bilirubin 1.13.  CRP mildly elevated, 5.3.  Vitamin D low, 12.5.  Vitamin B12 borderline low, 269.  Infliximab level 187 with no detectable antidrug antibodies.  QuantiFERON gold negative.  Chronic hepatitis panel nonreactive.  Orders:  •  CBC; Future  •  C-reactive protein; Future  •  Iron Panel (Includes Ferritin, Iron Sat%, Iron, and TIBC); Future  •  Vitamin B12; Future  •  Vitamin D 25 hydroxy; Future  •  pneumococcal 20-sanam conj vacc  (PREVNAR 20) 0.5 ML ABIODUN; Inject 0.5 mL into a muscle once for 1 dose  -Continue infliximab 10 mg/kg every 6 weeks.  -Recommend avoiding/limiting dietary triggers as able.  -Follow-up recent infliximab TDM, currently in process.  Noted this lab was drawn about 1 to 2 weeks prior to his next infusion.  -Continue turmeric, recommend 1000 mg twice daily.  -Check CBC, CRP.  -Update vitamin D, vitamin B12, and iron levels.  -Continue to follow-up with dermatology once yearly.  -Recommend routine vaccinations.  Also recommend Prevnar 20, sent to pharmacy.  -Follow-up in 6 months.  Vitamin D deficiency  Vitamin D was low, 12.5 in 11/2025.  S/p repletion dosing x 16 weeks.  Orders:  •  Vitamin D 25 hydroxy; Future  -Recheck level.      History of Present Illness   Simone Steiner is a 45 y.o. male with longstanding history of Crohn's disease diagnosed at age 21, on infliximab 10 mg/kg every 6 weeks who presents for follow-up.  Last seen in 12/2024.    Simone continues on infliximab 10 mg/kg every 6 weeks.  He reports he is feeling well overall.  He typically has 3 formed BMs daily with no abdominal pain, urgency, or nocturnal bowel movements.  He has intermittent fecal urgency and very rare episodes of incontinence, typically related to specific foods (such as spicy foods or lactose).  He has a history of eczema, but this is under good control with Dupixent.  He denies any canker sores or joint pains.  He denies any heartburn, nausea, vomiting.  Appetite is good and weight is stable.  He is currently taking turmeric once daily.  He has a 10 month old daughter at home!  He is up-to-date with vaccines including COVID booster, flu shot, hepatitis B vaccine, and Tdap.  He sees dermatology once annually, next scheduled in August.    Labs 6/2025 reviewed.  CMP normal other than total bilirubin 1.12.  Lipid panel normal other than triglycerides 189.  Infliximab TDM in process.    Labs 11/2024 reviewed.  CBC normal.  CMP normal  other than total bilirubin 1.13.  CRP mildly elevated, 5.3.  Vitamin D low, 12.5.  Vitamin B12 borderline low, 269.  Infliximab level 187 with no detectable antidrug antibodies.  QuantiFERON gold negative.  Chronic hepatitis panel nonreactive.    MR enterography 7/2024 negative for signs of active inflammation, stricture, penetrating disease, perianal disease, but noted that fecalization was seen x 13 cm the distal/terminal ileum.    Colonoscopy 6/4/2024 showed normal terminal ileum and entire colon.    Pathology  A. Terminal Ileum, biopsy:  -   Small intestinal mucosa with no significant histopathologic change.   -   No evidence of ileitis.      B. Colon, 80cm, biopsy:  -   Colonic mucosa with no significant histopathologic change.  -   Negative for granuloma and dysplasia.        C. Colon, 70cm, biopsy:  -   Colonic mucosa with no significant histopathologic change.  -   Negative for granuloma and dysplasia.         D. Colon, 60cm, biopsy:  -   Colonic mucosa with no significant histopathologic change.  -   Negative for granuloma and dysplasia.         E. Colon, 50cm, biopsy:  -   Colonic mucosa with no significant histopathologic change.  -   Negative for granuloma and dysplasia.         F. Colon, 40cm, biopsy:  -   Colonic mucosa with no significant histopathologic change.  -   Negative for granuloma and dysplasia.         G. Colon, 30cm, biopsy:  -   Colonic mucosa with no significant histopathologic change.  -   Negative for granuloma and dysplasia.         H. Colon, 20cm, biopsy:  -   Colonic mucosa with minimal architecture distortion.  -   Negative for granuloma and dysplasia.      I. Colon, 10cm, biopsy:  -   Colorectal mucosa with no significant histopathologic change.  -   Negative for granuloma and dysplasia.    HPI    Review of Systems A complete review of systems is negative other than that noted above in the HPI.      Current Medications[1]  Objective   /72 (BP Location: Right arm, Patient  "Position: Sitting, Cuff Size: Adult)   Pulse 81   Temp 97.9 °F (36.6 °C) (Tympanic)   Ht 5' 7\" (1.702 m)   Wt 107 kg (236 lb)   SpO2 98%   BMI 36.96 kg/m²     Physical Exam  Vitals reviewed.   HENT:      Head: Normocephalic and atraumatic.     Eyes:      Conjunctiva/sclera: Conjunctivae normal.     Pulmonary:      Effort: Pulmonary effort is normal.     Skin:     Coloration: Skin is not jaundiced or pale.     Neurological:      General: No focal deficit present.     Psychiatric:         Mood and Affect: Mood normal.         Behavior: Behavior normal.          Lab Results: I personally reviewed relevant lab results.       Results for orders placed during the hospital encounter of 06/04/24    Colonoscopy    Impression  The terminal ileum and entire colon appeared normal. Performed random biopsy using biopsy forceps.        RECOMMENDATION:  Await pathology results  Repeat colonoscopy in 1 year, due: 6/4/2025  Inflammatory bowel disease              Catalina Hutchison MD               [1]  Current Outpatient Medications   Medication Sig Dispense Refill   • dupilumab (DUPIXENT) subcutaneous injection      • InFLIXimab (REMICADE IV) Inject 10 mL/kg into a catheter in a vein     • pneumococcal 20-sanam conj vacc (PREVNAR 20) 0.5 ML ABIODUN Inject 0.5 mL into a muscle once for 1 dose 0.5 mL 0   • sertraline (ZOLOFT) 25 mg tablet TAKE 4 TABLETS BY MOUTH DAILY. 120 tablet 5   • sildenafil (VIAGRA) 50 MG tablet Take 1 tablet (50 mg total) by mouth as needed for erectile dysfunction 10 tablet 4   • acyclovir (ZOVIRAX) 400 MG tablet Take 1 pill twice a day 60 tablet 6     No current facility-administered medications for this visit.   "

## 2025-06-16 LAB
INFLIXIMAB AB SERPL-MCNC: <22 NG/ML
INFLIXIMAB SERPL-MCNC: 34 UG/ML

## 2025-06-22 ENCOUNTER — OFFICE VISIT (OUTPATIENT)
Dept: URGENT CARE | Age: 45
End: 2025-06-22
Payer: COMMERCIAL

## 2025-06-22 VITALS — OXYGEN SATURATION: 98 % | RESPIRATION RATE: 18 BRPM | HEART RATE: 77 BPM | TEMPERATURE: 98.1 F

## 2025-06-22 DIAGNOSIS — H04.551 OBSTRUCTION OF RIGHT LACRIMAL DUCT: ICD-10-CM

## 2025-06-22 DIAGNOSIS — J04.0 ACUTE LARYNGITIS: Primary | ICD-10-CM

## 2025-06-22 PROCEDURE — G0382 LEV 3 HOSP TYPE B ED VISIT: HCPCS | Performed by: PHYSICIAN ASSISTANT

## 2025-06-22 PROCEDURE — S9083 URGENT CARE CENTER GLOBAL: HCPCS | Performed by: PHYSICIAN ASSISTANT

## 2025-06-22 RX ORDER — OFLOXACIN 3 MG/ML
1 SOLUTION/ DROPS OPHTHALMIC 4 TIMES DAILY
Qty: 5 ML | Refills: 0 | Status: SHIPPED | OUTPATIENT
Start: 2025-06-22

## 2025-06-22 RX ORDER — METHYLPREDNISOLONE 4 MG/1
TABLET ORAL
Qty: 1 EACH | Refills: 0 | Status: SHIPPED | OUTPATIENT
Start: 2025-06-22

## 2025-06-22 NOTE — PROGRESS NOTES
[facility]        NAME: Simone Steiner is a 45 y.o. male  : 1980    MRN: [id number]  DATE: 2025  TIME: 6:54 PM    Assessment and Plan   Acute laryngitis [J04.0]  1. Acute laryngitis  methylPREDNISolone 4 MG tablet therapy pack      2. Obstruction of right lacrimal duct  ofloxacin (OCUFLOX) 0.3 % ophthalmic solution          I advised the patient to use warm compresses for the lacrimal duct inflammation obstruction 4 times a day and do self massage on that area    The patient and/or parent/legal guardian verbalized understanding of exam findings and   Treatment plan. We engaged in the shared decision-making process and treatment options were   discussed at length with the patient.  All questions, concerns and complaints were answered and   addressed to the patient's' and/or parent/legal guardians's satisfaction.    Patient Instructions   There are no Patient Instructions on file for this visit.    Follow up with PCP in 3-5 days.  Proceed to  ER if symptoms worsen.    If tests are performed, our office will contact you with results only if   changes need to made to the care plan discussed with you at the visit.   [facility]     Chief Complaint     Chief Complaint   Patient presents with   • Cold Like Symptoms     Patient notes swelling of the right under eye for about 2 weeks. He has dry throat for 2 days and swelling of the lymph nodes in the neck. He is starting to lose his voice. Wife recently recovered from viral cold.    • Eye Pain         History of Present Illness       HPI  Patient comes in complaining of swelling under his right eye for the past 2 weeks.  Also has dry throat for the past 2 days swelling of the lymph nodes in the neck region.  Pains lose his voice.  His wife recently had a viral URI. No discharge or trouble seeing from R eye. No fever or chills. No productive cough.    Review of Systems   Review of Systems  All other related  "systems reviewed with patient or accompanying historian and are negative except as noted in HPI    Current Medications     Current Medications[1]    Current Allergies     Allergies as of 06/22/2025 - Reviewed 06/22/2025   Allergen Reaction Noted   • Cat dander Other (See Comments) 05/21/2024   • Shellfish-derived products - food allergy GI Intolerance 01/18/2016            The following portions of the patient's history were reviewed and updated as appropriate: allergies, current medications, past family history, past medical history, past social history, past surgical history and problem list.     Past Medical History[2]    Past Surgical History[3]    Family History[4]      Medications have been verified.        Objective   Pulse 77   Temp 98.1 °F (36.7 °C)   Resp 18   SpO2 98%   No LMP for male patient.       Physical Exam     Physical Exam  Constitutional:       General: He is not in acute distress.     Appearance: He is well-developed.   HENT:      Head: Normocephalic and atraumatic.     Eyes:      General: No scleral icterus.     Comments: Mild conjunctival injection bilateral eyes, there is swelling of the lacrimal duct but no expressible purulence   Pulmonary:      Effort: Pulmonary effort is normal. No respiratory distress.      Breath sounds: No stridor.     Musculoskeletal:      Cervical back: Normal range of motion and neck supple.     Skin:     General: Skin is warm and dry.     Neurological:      Mental Status: He is alert and oriented to person, place, and time.     Psychiatric:         Behavior: Behavior normal.         Ortho Exam        Procedures  No Procedures performed today        Note: Portions of this record may have been created with voice recognition software. Occasional wrong word or \"sound a like\" substitutions may have occurred due to the inherent limitations of voice recognition software. Please read the chart carefully and recognize, using context, where substitutions have " occurred.*             [1]    Current Outpatient Medications:   •  dupilumab (DUPIXENT) subcutaneous injection, , Disp: , Rfl:   •  InFLIXimab (REMICADE IV), Inject 10 mL/kg into a catheter in a vein, Disp: , Rfl:   •  methylPREDNISolone 4 MG tablet therapy pack, Use as directed on package, Disp: 1 each, Rfl: 0  •  ofloxacin (OCUFLOX) 0.3 % ophthalmic solution, Administer 1 drop to the right eye 4 (four) times a day, Disp: 5 mL, Rfl: 0  •  sertraline (ZOLOFT) 25 mg tablet, TAKE 4 TABLETS BY MOUTH DAILY., Disp: 120 tablet, Rfl: 5  •  sildenafil (VIAGRA) 50 MG tablet, Take 1 tablet (50 mg total) by mouth as needed for erectile dysfunction, Disp: 10 tablet, Rfl: 4  •  acyclovir (ZOVIRAX) 400 MG tablet, Take 1 pill twice a day, Disp: 60 tablet, Rfl: 6[2]  Past Medical History:  Diagnosis Date   • Allergic    • Arthritis    • Crohn's disease (HCC)    • Eczema    • Rheumatoid arthritis (HCC)    [3]  Past Surgical History:  Procedure Laterality Date   • COLONOSCOPY N/A 1/18/2016    Procedure: COLONOSCOPY;  Surgeon: Raghu Patel MD;  Location: [facility];  Service:    [4]  Family History  Problem Relation Name Age of Onset   • Dementia Father Carlos Enrique Steiner    • Dementia Paternal Grandmother Katharina Obdulia    • Bipolar disorder Paternal Grandmother Katharinaroseline Steiner

## 2025-06-22 NOTE — LETTER
June 22, 2025     Patient: Simone Steiner   YOB: 1980   Date of Visit: 6/22/2025       To Whom It May Concern:    It is my medical opinion that Simone Steiner may return to work on 6/23/25.    If you have any questions or concerns, please don't hesitate to call.         Sincerely,        James Lee PA-C    CC: No Recipients

## 2025-07-07 ENCOUNTER — OFFICE VISIT (OUTPATIENT)
Age: 45
End: 2025-07-07
Payer: COMMERCIAL

## 2025-07-07 VITALS
BODY MASS INDEX: 37.57 KG/M2 | TEMPERATURE: 98 F | HEART RATE: 77 BPM | OXYGEN SATURATION: 98 % | SYSTOLIC BLOOD PRESSURE: 124 MMHG | WEIGHT: 239.4 LBS | DIASTOLIC BLOOD PRESSURE: 70 MMHG | HEIGHT: 67 IN

## 2025-07-07 DIAGNOSIS — Z12.11 SCREENING FOR COLON CANCER: ICD-10-CM

## 2025-07-07 DIAGNOSIS — F33.9 EPISODE OF RECURRENT MAJOR DEPRESSIVE DISORDER, UNSPECIFIED DEPRESSION EPISODE SEVERITY (HCC): ICD-10-CM

## 2025-07-07 DIAGNOSIS — Z63.0 MARITAL PROBLEM: ICD-10-CM

## 2025-07-07 DIAGNOSIS — N52.9 ERECTILE DYSFUNCTION, UNSPECIFIED ERECTILE DYSFUNCTION TYPE: ICD-10-CM

## 2025-07-07 DIAGNOSIS — F33.9 EPISODE OF RECURRENT MAJOR DEPRESSIVE DISORDER, UNSPECIFIED DEPRESSION EPISODE SEVERITY (HCC): Primary | ICD-10-CM

## 2025-07-07 DIAGNOSIS — K50.10 CROHN'S DISEASE OF LARGE INTESTINE WITHOUT COMPLICATION (HCC): ICD-10-CM

## 2025-07-07 DIAGNOSIS — R73.09 ABNORMAL GLUCOSE: ICD-10-CM

## 2025-07-07 DIAGNOSIS — E78.1 HYPERTRIGLYCERIDEMIA: ICD-10-CM

## 2025-07-07 DIAGNOSIS — E78.2 MODERATE MIXED HYPERLIPIDEMIA NOT REQUIRING STATIN THERAPY: ICD-10-CM

## 2025-07-07 PROBLEM — N28.9 RENAL LESION: Status: RESOLVED | Noted: 2020-12-07 | Resolved: 2025-07-07

## 2025-07-07 PROBLEM — B00.9 HERPES SIMPLEX: Status: RESOLVED | Noted: 2024-02-13 | Resolved: 2025-07-07

## 2025-07-07 PROBLEM — J40 BRONCHITIS: Status: RESOLVED | Noted: 2025-01-06 | Resolved: 2025-07-07

## 2025-07-07 PROCEDURE — 99396 PREV VISIT EST AGE 40-64: CPT | Performed by: INTERNAL MEDICINE

## 2025-07-07 RX ORDER — SILDENAFIL 50 MG/1
50 TABLET, FILM COATED ORAL AS NEEDED
Qty: 10 TABLET | Refills: 4 | Status: SHIPPED | OUTPATIENT
Start: 2025-07-07

## 2025-07-07 RX ORDER — SERTRALINE HYDROCHLORIDE 25 MG/1
100 TABLET, FILM COATED ORAL DAILY
Qty: 120 TABLET | Refills: 5 | Status: SHIPPED | OUTPATIENT
Start: 2025-07-07 | End: 2025-07-09

## 2025-07-07 SDOH — SOCIAL STABILITY - SOCIAL INSECURITY: PROBLEMS IN RELATIONSHIP WITH SPOUSE OR PARTNER: Z63.0

## 2025-07-07 NOTE — ASSESSMENT & PLAN NOTE
Improvement in lipid profile reviewed with the patient continue low-cholesterol diet exercise and weight reduction

## 2025-07-07 NOTE — ASSESSMENT & PLAN NOTE
Viagra beneficial 50 mg as needed new prescription provided to the patient    Orders:    sildenafil (VIAGRA) 50 MG tablet; Take 1 tablet (50 mg total) by mouth as needed for erectile dysfunction

## 2025-07-07 NOTE — ASSESSMENT & PLAN NOTE
Fasting blood sugar 91 improved recommend continuation of care and consumption of carbohydrates and concentrated sugars regular exercise and weight reduction through calorie consumption reduction

## 2025-07-07 NOTE — ASSESSMENT & PLAN NOTE
Depression symptoms assessed today and appear to be stable.  Continue 100 mg sertraline daily    Orders:    sertraline (ZOLOFT) 25 mg tablet; Take 4 tablets (100 mg total) by mouth daily

## 2025-07-07 NOTE — PROGRESS NOTES
Name: Simone Steiner      : 1980      MRN: [id number]  Encounter Provider: Elías Silva MD  Encounter Date: 2025   Encounter department: [facility] INTERNAL MEDICINE    Assessment & Plan  Screening for colon cancer    Orders:    Ambulatory Referral to Colorectal Surgery; Future    Marital problem    Orders:    Ambulatory referral to Psych Services; Future    Episode of recurrent major depressive disorder, unspecified depression episode severity (HCC)  Depression symptoms assessed today and appear to be stable.  Continue 100 mg sertraline daily    Orders:    sertraline (ZOLOFT) 25 mg tablet; Take 4 tablets (100 mg total) by mouth daily    Erectile dysfunction, unspecified erectile dysfunction type  Viagra beneficial 50 mg as needed new prescription provided to the patient    Orders:    sildenafil (VIAGRA) 50 MG tablet; Take 1 tablet (50 mg total) by mouth as needed for erectile dysfunction    Crohn's disease of large intestine without complication (HCC)  Crohn's disease currently stable continue Remicade         Moderate mixed hyperlipidemia not requiring statin therapy  Improvement in lipid profile reviewed with the patient continue low-cholesterol diet exercise and weight reduction         Hypertriglyceridemia  Triglyceride profile reviewed with the patient continue low carbohydrate low concentrated sugar diet exercise and weight reduction         Abnormal glucose  Fasting blood sugar 91 improved recommend continuation of care and consumption of carbohydrates and concentrated sugars regular exercise and weight reduction through calorie consumption reduction              History of Present Illness     This pleasant 45-year-old gentleman returns to our office today for his annual physical examination.  Indicates he has been feeling well.  He and his wife have a young child healthy young girl.  Indicates sleep is not as good as it used to be before his daughter was born.  Reassured the  "patient things will get better as a baby gets older.      Review of Systems   All other systems reviewed and are negative.    Past Medical History[1]  Past Surgical History[2]  Family History[3]  Social History[4]  Medications[5]  Allergies   Allergen Reactions    Cat Dander Other (See Comments)     Eye swelling and trouble breathing     Shellfish-Derived Products - Food Allergy GI Intolerance     Immunization History   Administered Date(s) Administered    COVID-19 MODERNA VACC 0.5 ML IM 03/30/2021, 04/30/2021    COVID-19 Moderna Vac BIVALENT 12 Yr+ IM 0.5 ML 10/23/2022    COVID-19 Pfizer mRNA vacc PF carmela-sucrose 12 yr and older (Comirnaty) 10/05/2023, 09/10/2024    INFLUENZA 11/18/2021, 10/05/2023    Influenza, injectable, quadrivalent, preservative free 0.5 mL 11/02/2018, 09/09/2020    Influenza, seasonal, injectable 12/27/2011    Tdap 04/23/2024    Tuberculin Skin Test-PPD Intradermal 12/17/2012     Objective   /70   Pulse 77   Temp 98 °F (36.7 °C) (Tympanic)   Ht 5' 7\" (1.702 m)   Wt 109 kg (239 lb 6.4 oz)   SpO2 98%   BMI 37.50 kg/m²     Physical Exam  Vitals and nursing note reviewed.   Constitutional:       General: He is not in acute distress.     Appearance: He is well-developed.   HENT:      Head: Normocephalic and atraumatic.      Right Ear: Tympanic membrane, ear canal and external ear normal.      Left Ear: Tympanic membrane, ear canal and external ear normal.      Nose: Nose normal.      Mouth/Throat:      Mouth: Mucous membranes are moist.      Pharynx: Oropharynx is clear.     Eyes:      Extraocular Movements: Extraocular movements intact.      Conjunctiva/sclera: Conjunctivae normal.     Neck:      Vascular: No carotid bruit.     Cardiovascular:      Rate and Rhythm: Normal rate and regular rhythm.      Heart sounds: Normal heart sounds. No murmur heard.  Pulmonary:      Effort: Pulmonary effort is normal. No respiratory distress.      Breath sounds: Normal breath sounds. No wheezing, " rhonchi or rales.   Abdominal:      General: There is no distension.      Palpations: Abdomen is soft. There is no mass.      Tenderness: There is no abdominal tenderness. There is no guarding.      Hernia: There is no hernia in the left inguinal area or right inguinal area.   Genitourinary:     Penis: Normal.       Testes: Normal.      Epididymis:      Right: Normal.      Left: Normal.      Prostate: Normal.     Musculoskeletal:         General: No swelling.      Cervical back: Normal range of motion and neck supple. No rigidity or tenderness.   Lymphadenopathy:      Cervical: No cervical adenopathy.      Lower Body: No right inguinal adenopathy. No left inguinal adenopathy.     Skin:     General: Skin is warm and dry.      Capillary Refill: Capillary refill takes less than 2 seconds.      Coloration: Skin is not jaundiced.     Neurological:      General: No focal deficit present.      Mental Status: He is alert. Mental status is at baseline.     Psychiatric:         Mood and Affect: Mood normal.         Behavior: Behavior normal.         Thought Content: Thought content normal.         Judgment: Judgment normal.         Answers submitted by the patient for this visit:  Annual Physical (Submitted on 7/7/2025)  Diet/Nutrition choices: no special diet, well balanced diet, limited junk food  Exercise choices: moderate cardiovascular exercise, vigorous cardiovascular exercise  Sleep choices: 4-6 hours of sleep on average, 7-8 hours of sleep on average  Hearing choices: normal hearing right ear, normal hearing left ear, normal hearing bilateral ears  Vision choices: most recent eye exam > 1 year ago, wears glasses, wears contacts, wears glasses and contacts  Dental choices: no dental visits for > 1 year, brushes teeth twice daily, does not floss  Do you currently have an OB/GYN provider that you routinely follow with?: No  Any history of sexual transmitted disease/infection?: Yes  Urinary symptoms: erectile dysfunction,  nocturia  Do you have an advance directive/living will?: No  Do you have a durable power of  (POA)?: No         [1]   Past Medical History:  Diagnosis Date    Allergic     Arthritis     Crohn's disease (HCC)     Eczema     Rheumatoid arthritis (HCC)    [2]   Past Surgical History:  Procedure Laterality Date    COLONOSCOPY N/A 1/18/2016    Procedure: COLONOSCOPY;  Surgeon: Raghu Patel MD;  Location: [facility];  Service:    [3]   Family History  Problem Relation Name Age of Onset    Dementia Father Carlos Enrique Steiner     Dementia Paternal Grandmother Katharina Steiner     Bipolar disorder Paternal Grandmother Katharina Steiner    [4]   Social History  Tobacco Use    Smoking status: Never    Smokeless tobacco: Never   Vaping Use    Vaping status: Never Used   Substance and Sexual Activity    Alcohol use: Not Currently     Alcohol/week: 6.0 standard drinks of alcohol     Types: 3 Cans of beer, 3 Standard drinks or equivalent per week    Drug use: No    Sexual activity: Not Currently     Partners: Female     Birth control/protection: Condom Male, Female Sterilization   [5]   Current Outpatient Medications on File Prior to Visit   Medication Sig    dupilumab (DUPIXENT) subcutaneous injection     InFLIXimab (REMICADE IV) Inject 10 mL/kg into a catheter in a vein    [DISCONTINUED] sertraline (ZOLOFT) 25 mg tablet TAKE 4 TABLETS BY MOUTH DAILY.    [DISCONTINUED] sildenafil (VIAGRA) 50 MG tablet Take 1 tablet (50 mg total) by mouth as needed for erectile dysfunction    acyclovir (ZOVIRAX) 400 MG tablet Take 1 pill twice a day    [DISCONTINUED] methylPREDNISolone 4 MG tablet therapy pack Use as directed on package    [DISCONTINUED] ofloxacin (OCUFLOX) 0.3 % ophthalmic solution Administer 1 drop to the right eye 4 (four) times a day

## 2025-07-07 NOTE — ASSESSMENT & PLAN NOTE
Triglyceride profile reviewed with the patient continue low carbohydrate low concentrated sugar diet exercise and weight reduction

## 2025-07-08 ENCOUNTER — TELEPHONE (OUTPATIENT)
Age: 45
End: 2025-07-08

## 2025-07-08 NOTE — TELEPHONE ENCOUNTER
"Behavioral Health Outpatient Intake Questions    Referred By   : PCP ASAP referral     Please advise interviewee that they need to answer all questions truthfully to allow for best care, and any misrepresentations of information may affect their ability to be seen at this clinic   => Was this discussed? Yes     If Minor Child (under age 18)    Who is/are the legal guardian(s) of the child?     Is there a custody agreement? No     If \"YES\"- Custody orders must be obtained prior to scheduling the first appointment  In addition, Consent to Treatment must be signed by all legal guardians prior to scheduling the first appointment    If \"NO\"- Consent to Treatment must be signed by all legal guardians prior to scheduling the first appointment    Behavioral Health Outpatient Intake History -     Presenting Problem (in patient's own words):     Pt stayed in contact with Ex fiance while currently  with children. Pt stated that it is causing problems in marriage.       Are there any communication barriers for this patient?     No                                               If yes, please describe barriers: NONE   If there is a unique situation, please refer to Raffy Judge/Jaqueline Ferrer for final determination.    Are you taking any psychiatric medications? Yes     If \"YES\" -What are they Zoloft     If \"YES\" -Who prescribes? PCP     Has the Patient previously received outpatient Talk Therapy or Medication Management from [facility]         If \"YES\"- When, Where and with Whom? [facility]         If \"NO\" -Has Patient received these services elsewhere?       If \"YES\" -When, Where, and with Whom?    Has the Patient abused alcohol or other substances in the last 6 months ? No  No concerns of substance abuse are reported.     If \"YES\" -What substance, How much, How often?     If illegal substance: Refer to [facility] (for [facility]) or [facility].   If Alcohol in excess of 10 drinks per week:  Refer to [facility] (for MIKEY) " "or [facility]    Legal History-     Is this treatment court ordered? No   If \"yes \"send to :  Talk Therapy : Send to Raffy Judge for final determination   Med Management: Send to Dr. Cardona for final determination     Has the Patient been convicted of a felony?  No   If \"Yes\" send to -When, What?  Talk Therapy: Send to Raffy Judge for final determination   Med Management: Send to Dr. Cardona for final determination     ACCEPTED as a patient Yes  If \"Yes\" Appointment Date: 7/21/2025    Referred Elsewhere? No  If “Yes” - (Where? Ex: [facility], [facility], [facility], [facility], etc.)       Name of Insurance Co:[employer]   Insurance ID#[id number]  Insurance Phone #  If ins is primary or secondary?Primary   If patient is a minor, parents information such as Name, D.O.B of guarantor.  "

## 2025-07-09 DIAGNOSIS — F33.9 EPISODE OF RECURRENT MAJOR DEPRESSIVE DISORDER, UNSPECIFIED DEPRESSION EPISODE SEVERITY (HCC): ICD-10-CM

## 2025-07-09 RX ORDER — SERTRALINE HYDROCHLORIDE 100 MG/1
100 TABLET, FILM COATED ORAL DAILY
Qty: 30 TABLET | Refills: 5 | Status: SHIPPED | OUTPATIENT
Start: 2025-07-09 | End: 2025-07-09 | Stop reason: SDUPTHER

## 2025-07-09 RX ORDER — SERTRALINE HYDROCHLORIDE 100 MG/1
100 TABLET, FILM COATED ORAL DAILY
Qty: 30 TABLET | Refills: 5 | Status: SHIPPED | OUTPATIENT
Start: 2025-07-09

## 2025-07-13 ENCOUNTER — PATIENT MESSAGE (OUTPATIENT)
Age: 45
End: 2025-07-13

## 2025-07-17 ENCOUNTER — TELEPHONE (OUTPATIENT)
Dept: BEHAVIORAL/MENTAL HEALTH CLINIC | Facility: CLINIC | Age: 45
End: 2025-07-17

## 2025-07-17 NOTE — TELEPHONE ENCOUNTER
Patient returned call to speak with provider Remy Augustin. Writegem rehman transferred call to the office. Writer transferred call but patient was no longer on the line. Please call back when available to speak with patient and schedule future appointments.

## 2025-07-17 NOTE — TELEPHONE ENCOUNTER
Clinician attempted to call client to discuss scheduling. If client calls back please help client create a recurring schedule until 12/31/2025 for every other week. Also verify if he still plans on attending his intake on Monday 7/21/2025, if he does not then please do not create recurrings.    Update- Clinician spoke with client at 1354 and schedule for future sessions.

## 2025-07-21 ENCOUNTER — OFFICE VISIT (OUTPATIENT)
Dept: BEHAVIORAL/MENTAL HEALTH CLINIC | Facility: CLINIC | Age: 45
End: 2025-07-21
Payer: COMMERCIAL

## 2025-07-21 DIAGNOSIS — F33.9 EPISODE OF RECURRENT MAJOR DEPRESSIVE DISORDER, UNSPECIFIED DEPRESSION EPISODE SEVERITY (HCC): Primary | ICD-10-CM

## 2025-07-21 DIAGNOSIS — Z63.0 MARITAL PROBLEM: ICD-10-CM

## 2025-07-21 PROCEDURE — 90791 PSYCH DIAGNOSTIC EVALUATION: CPT | Performed by: COUNSELOR

## 2025-07-21 SDOH — SOCIAL STABILITY - SOCIAL INSECURITY: PROBLEMS IN RELATIONSHIP WITH SPOUSE OR PARTNER: Z63.0

## 2025-07-21 NOTE — BH CRISIS PLAN
Client Name: Simone Steiner       Client YOB: 1980    [facility] Safety Plan      Creation Date: 7/21/25 Update Date: 7/21/25   Created By: Remy Augustin LPC Last Updated By: Remy Augustin LPC      Step 1: Warning Signs:   Warning Signs   Croch in the corner.            Step 2: Internal Coping Strategies:   Internal Coping Strategies   A mantra: 1-10 of [place] Maria Del Rosario retired numbers.            Step 3: People and social settings that provide distraction:   Name Contact Information   Stephanie (Wife) In phone    [facility]           Step 4: People whom I can ask for help during a crisis:      Name Contact Information    Elías (Brother) In phone      Step 5: Professionals or agencies I can contact during a crisis:      Clinican/Agency Name Phone Emergency Contact    Remy Augustin/ [facility] [phone]       [facility] Emergency Department Emergency Department Phone Emergency Department Address    [facility] Emergency Room [phone] [address] [place] [address]        Crisis Phone Numbers:   Suicide Prevention Lifeline: Call or Text  366 Crisis Text Line: Text HOME to 684-705   Please note: Some [facility] do not have a separate number for Child/Adolescent specific crisis. If your county is not listed under Child/Adolescent, please call the adult number for your county      Adult Crisis Numbers: Child/Adolescent Crisis Numbers   [facility]: [phone] [facility]: [phone]   [facility]: [phone] [facility]: [phone]   [facility]: [phone] [place]: [phone]   [facility]: [phone] [facility]: [phone]   [place]: [phone]   [facility]: [phone]   [facility]: [phone]   [place] [facility]: [phone] (daytime) [phone] (after hours, weekends, holidays)      Step 6: Making the environment safer (plan for lethal means  "safety):   Patient did not identify any lethal methods: Yes     Optional: What is most important to me and worth living for?   \"My daughter, Karli\".      [facility] Safety Plan. Lea Peacock and Jim Ernst. Used with permission of the authors.           "

## 2025-07-21 NOTE — BH TREATMENT PLAN
"Outpatient Behavioral Health Psychotherapy Treatment Plan    Simone Steiner  1980     Date of Initial Psychotherapy Assessment: 7/21/2025   Date of Current Treatment Plan: 07/21/25  Treatment Plan Target Date: 01/21/2026  Treatment Plan Expiration Date: 01/21/2026    Diagnosis:   1. Episode of recurrent major depressive disorder, unspecified depression episode severity (HCC)        2. Marital problem  Ambulatory referral to Psych Services          Area(s) of Need: Coping, self-care, boundaries, communication.    Long Term Goal 1 (in the client's own words): \"Rebuilding trust with my wife\"    Stage of Change: Action    Target Date for completion: 01/21/2026     Anticipated therapeutic modalities: CBT, Client Centered Therapy, Solution Focused Therapy, Mindfulness Based Therapy.      People identified to complete this goal: Clinician and client.       Objective 1: (identify the means of measuring success in meeting the objective): Client will attend all counseling sessions as scheduled.       Objective 2: (identify the means of measuring success in meeting the objective): Client will attend all medication management appointments as scheduled and take medication as prescribed, client will also communicate with prescriber as needed.     Objective 3: (identify the means of measuring success in meeting the objective): Client will explore the possibility of ceasing communication with ex-fiance and will make a decision within the next 6 months.      Objective 4: (identify the means of measuring success in meeting the objective): Client will explore the reason(s) he is hesitant to cease communication with his ex-fiance over the next 6 months.      Objective 5: (identify the means of measuring success in meeting the objective): Client will learn and utilize at least 3 tools/ exercises to use to help control urges to communicate with ex-fiance.        I am currently under the care of a [facility] psychiatric provider: " no    My [facility] psychiatric provider is: Elías Silva MD (PCP)    I am currently taking psychiatric medications: Yes, as prescribed    I feel that I will be ready for discharge from mental health care when I reach the following (measurable goal/objective): When I reach my goals.    For children and adults who have a legal guardian:   Has there been any change to custody orders and/or guardianship status? N/A. If yes, attach updated documentation.    I have created my Crisis Plan and have been offered a copy of this plan    Behavioral Health Treatment Plan [facility]: Diagnosis and Treatment Plan explained to Simone Steiner acknowledges an understanding of their diagnosis. Simone Steiner agrees to this treatment plan.    I have been offered a copy of this Treatment Plan. yes

## 2025-07-21 NOTE — PSYCH
" Behavioral Health Psychotherapy Assessment    Date of Initial Psychotherapy Assessment: 07/21/25  Referral Source: PCP  Has a release of information been signed for the referral source? Yes    Preferred Name: Simone Steiner  Preferred Pronouns: He/him  YOB: 1980 Age: 45 y.o.  Sex assigned at birth: male   Gender Identity: Male  Race:   Preferred Language: English    Emergency Contact:  Full Name: Stephanie Steiner  Relationship to Client: Wife  Contact information: [phone]    Primary Care Physician:  Elías Silva MD  [address] [place] [address] [address], [address]  [place] [address]  [phone]  Has a release of information been signed? Yes    Physical Health History:  Past surgical procedures:  has a past surgical history that includes Colonoscopy (N/A, 1/18/2016). Repeated every two years, last 2024.  Do you have a history of any of the following: none   Do you have any mobility issues? No  Developmental History: No concerns.     Relevant Family History:  family history includes Bipolar disorder in his paternal grandmother; Dementia in his father and paternal grandmother.     Presenting Problem (What brings you in?)  \"I had a relationship that ended, we were engaged. I had a one year period of mourning. I then got  and maintain ties with the ex fiance and my wife feels it is emotional adultery', and that brings me here.     Clinician inquired to what client's goal is- \"I haven't even thought of that, I don't know, I guess, ability to move on (from the ex). The intension was to maintain a friendship, but I don't think either of us that was the case. We maintained contact but there was a distance, then her relationship ended. We were always in different places and now she's in that place (metaphorically). She has 3 children which aren't mine and I care about her and them.\"    Client's wife saw text messages (they were inappropriate). The would be anniversary came up and they were " "texting (reminising) and his wife saw the messages and was upset. During creation of the treatment plan client admitted that he does not want to or believe he can stop speaking to his ex at this time.    Clinician reviewed attendance policy with client, three absences/ no shows may result in termination services. Client understands that notice less than 24 hrs before session is included in this policy and client may have to reschedule if they are more than 15 minutes late for session.     Mental Health Advance Directive:  Do you currently have a Mental Health Advance Directive?no    Diagnosis:   Diagnosis ICD-10-CM Associated Orders   1. Episode of recurrent major depressive disorder, unspecified depression episode severity (HCC)  F33.9       2. Marital problem  Z63.0 Ambulatory referral to Psych Services          Initial Assessment:     Current Mental Status:    Appearance: appropriate      Behavior/Manner: cooperative and guarded      Affect/Mood:  Anxious    Speech:  Stuttering and paucity    Sleep:  Interrupted    Oriented to: oriented to self, oriented to place and oriented to time       Clinical Symptoms    Have you ever been assaultive to others or the environment: No      Have you ever been self-injurious: Yes    Additional Abuse/Self Harm history:  30 years ago- Sophomore in High School, struggled with course load.Took a belt of a bathrobe and tied it around his neck and pulled, did not tie it to anything else.     Counseling History:  Previous Counseling or Treatment  (Mental Health or Drug & Alcohol): Yes    Previous Counseling Details:  One previous counseling experience. \"Wonderful rapport with him.\" - Positive experience.   Have you previously taken psychiatric medications: Yes    Previous Medications Attempted:  Zoloft.    Suicide Risk Assessment  Have you ever had a suicide attempt: No    Have you had incidents of suicidal ideation: Yes    Are you currently experiencing suicidal thoughts: No  " "  Additional Suicide Risk Information:  SI when client was 15 years of age.     Substance Abuse/Addiction Assessment:  Alcohol: Yes    Age of First Use:  18  Frequency:  Weekly  Amount:  3X/ Week  Last use:  7/20/2025  Heroin: No    Fentanyl: No    Opiates: No    Cocaine: No    Amphetamines: No    Hallucinogens: No    Club Drugs: No    Benzodiazepines: No    Other Rx Meds: No    Marijuana: Yes    Age of First Use:  20  Method:  Smoke/pipe  Last Use:  2020  Tobacco/Nicotine: Yes    Age of First Use:  18  Last Use:  December 2023  Are you interested in resources for smoking cessation: No    Have you experienced blackouts as a result of substance use: No    Have you had any periods of abstinence: No    Have you experienced symptoms of withdrawal: No    Have you ever overdosed on any substances?: No    Are you currently using any Medication Assisted Treatment for Substance Use: No      Compulsive Behaviors:  Compulsive Behavior Information:  Client denies compulsion.     Disordered Eating History:  Do you have a history of disordered eating: No      Social Determinants of Health:    SDOH:  Financial instability and stress    Trauma and Abuse History:    Have you ever been abused: No      Legal History:    Have you ever been arrested  or had a DUI: No      Have you been incarcerated: No      Are you currently on parole/probation: No      Any current Children and Youth involvement: No      Any pending legal charges: No      Relationship History:    Current marital status:       Relationship History:  Client is the youngest of 2 sons, he has a positive relationship with his older brother. Client's father has passed and client's mother lives with client. Client explained his relationship with his mother is \"very good\".   Client has been  for 8 months and has an 11 month old daughter. Client's marriage is currently struggling due to what client's paramour calls \"emotional adultery\". Client continues to maintain " "communication with his ex-fiance.   Client has 3 dogs and 1 cat.   Client explained he has a \"tight-knit\" group of friends, 4 people for approximately 14 years.     Employment History    Are you currently employed: Yes      Employer/ Job title:  [employer]/ [employer].    Future work goals:  \"Not getting fired\"    Sources of income/financial support:  Work     History:      Status: no history of  duty  Educational History:     Have you ever been diagnosed with a learning disability: No      Highest level of education:  Bachelor's Degree    School attended/attending:  [employer].    Have you ever had an IEP or 504-plan: No      Do you need assistance with reading or writing: No      Recommended Treatment:     Psychotherapy:  Individual sessions    Frequency:  2 times    Session frequency:  Monthly      Visit start and stop times:    07/21/25  Start Time: 1401  Stop Time: 1448  Total Visit Time: 47 minutes  "

## 2025-08-06 ENCOUNTER — SOCIAL WORK (OUTPATIENT)
Dept: BEHAVIORAL/MENTAL HEALTH CLINIC | Facility: CLINIC | Age: 45
End: 2025-08-06
Payer: COMMERCIAL

## 2025-08-06 DIAGNOSIS — F33.9 EPISODE OF RECURRENT MAJOR DEPRESSIVE DISORDER, UNSPECIFIED DEPRESSION EPISODE SEVERITY (HCC): Primary | ICD-10-CM

## 2025-08-06 PROCEDURE — 90834 PSYTX W PT 45 MINUTES: CPT | Performed by: COUNSELOR

## 2025-08-10 ENCOUNTER — OFFICE VISIT (OUTPATIENT)
Dept: URGENT CARE | Age: 45
End: 2025-08-10
Payer: COMMERCIAL

## 2025-08-13 ENCOUNTER — OFFICE VISIT (OUTPATIENT)
Age: 45
End: 2025-08-13
Payer: COMMERCIAL

## 2025-08-14 ENCOUNTER — APPOINTMENT (OUTPATIENT)
Dept: LAB | Facility: HOSPITAL | Age: 45
End: 2025-08-14
Attending: STUDENT IN AN ORGANIZED HEALTH CARE EDUCATION/TRAINING PROGRAM
Payer: COMMERCIAL

## 2025-08-14 ENCOUNTER — APPOINTMENT (OUTPATIENT)
Dept: LAB | Facility: HOSPITAL | Age: 45
End: 2025-08-14
Payer: COMMERCIAL

## 2025-08-18 ENCOUNTER — RESULTS FOLLOW-UP (OUTPATIENT)
Age: 45
End: 2025-08-18

## 2025-08-19 ENCOUNTER — SOCIAL WORK (OUTPATIENT)
Dept: BEHAVIORAL/MENTAL HEALTH CLINIC | Facility: CLINIC | Age: 45
End: 2025-08-19
Payer: COMMERCIAL

## 2025-08-19 DIAGNOSIS — F33.9 EPISODE OF RECURRENT MAJOR DEPRESSIVE DISORDER, UNSPECIFIED DEPRESSION EPISODE SEVERITY (HCC): Primary | ICD-10-CM

## 2025-08-19 PROCEDURE — 90837 PSYTX W PT 60 MINUTES: CPT | Performed by: COUNSELOR
